# Patient Record
Sex: MALE | Race: WHITE | NOT HISPANIC OR LATINO | Employment: OTHER | URBAN - METROPOLITAN AREA
[De-identification: names, ages, dates, MRNs, and addresses within clinical notes are randomized per-mention and may not be internally consistent; named-entity substitution may affect disease eponyms.]

---

## 2017-02-13 RX ORDER — CLOPIDOGREL BISULFATE 75 MG/1
75 TABLET ORAL DAILY
Qty: 30 TABLET | Refills: 11 | Status: SHIPPED | OUTPATIENT
Start: 2017-02-13 | End: 2017-11-14 | Stop reason: SDUPTHER

## 2017-02-13 NOTE — TELEPHONE ENCOUNTER
Spoke to the pt, pt stated that he will schedule his f/u appt, but also need refill on his Plavix.

## 2017-11-16 RX ORDER — CLOPIDOGREL BISULFATE 75 MG/1
TABLET ORAL
Qty: 30 TABLET | Refills: 10 | Status: SHIPPED | OUTPATIENT
Start: 2017-11-16 | End: 2018-12-18

## 2018-12-05 ENCOUNTER — TELEPHONE (OUTPATIENT)
Dept: VASCULAR SURGERY | Facility: CLINIC | Age: 63
End: 2018-12-05

## 2018-12-05 DIAGNOSIS — I73.9 PVD (PERIPHERAL VASCULAR DISEASE): ICD-10-CM

## 2018-12-05 DIAGNOSIS — I65.23 BILATERAL CAROTID ARTERY STENOSIS: Primary | ICD-10-CM

## 2018-12-05 NOTE — TELEPHONE ENCOUNTER
Contacted patient to schedule appts. Notified patient he is overdue for his FU with Dr. Vuong. Appts scheduled, pt verified. Appt letter placed in mail. ----- Message from Erika Peres sent at 12/5/2018 10:34 AM CST -----  Contact: pt   Patient Requesting Sooner Appointment.     Reason for sooner appt.: pt is calling to speak with the nurse pt is a new pt pt is being referred by [facility] for PVD   When is the first available appointment? N/A  Communication Preference: pt   Additional Information: can you please call pt at  [phone]    DENIS

## 2018-12-18 ENCOUNTER — HOSPITAL ENCOUNTER (OUTPATIENT)
Dept: VASCULAR SURGERY | Facility: CLINIC | Age: 63
Discharge: HOME | End: 2018-12-18
Attending: SURGERY
Payer: MEDICARE

## 2018-12-18 ENCOUNTER — OFFICE VISIT (OUTPATIENT)
Dept: VASCULAR SURGERY | Facility: CLINIC | Age: 63
End: 2018-12-18
Payer: MEDICARE

## 2018-12-18 VITALS
HEIGHT: 69 IN | DIASTOLIC BLOOD PRESSURE: 75 MMHG | HEART RATE: 68 BPM | TEMPERATURE: 98 F | BODY MASS INDEX: 27.11 KG/M2 | SYSTOLIC BLOOD PRESSURE: 173 MMHG | WEIGHT: 183 LBS

## 2018-12-18 DIAGNOSIS — M79.606 ISCHEMIC REST PAIN OF LOWER EXTREMITY: ICD-10-CM

## 2018-12-18 DIAGNOSIS — I73.9 PERIPHERAL ARTERIAL DISEASE: Primary | ICD-10-CM

## 2018-12-18 DIAGNOSIS — F17.210 CIGARETTE NICOTINE DEPENDENCE WITHOUT COMPLICATION: ICD-10-CM

## 2018-12-18 DIAGNOSIS — I99.8 ISCHEMIC REST PAIN OF LOWER EXTREMITY: ICD-10-CM

## 2018-12-18 DIAGNOSIS — F17.200 SMOKER: Chronic | ICD-10-CM

## 2018-12-18 DIAGNOSIS — I65.23 BILATERAL CAROTID ARTERY STENOSIS: ICD-10-CM

## 2018-12-18 DIAGNOSIS — I70.90 ATHEROSCLEROSIS: Primary | ICD-10-CM

## 2018-12-18 DIAGNOSIS — I65.22 STENOSIS OF LEFT CAROTID ARTERY: ICD-10-CM

## 2018-12-18 DIAGNOSIS — I70.229 ATHEROSCLEROTIC PERIPHERAL VASCULAR DISEASE WITH REST PAIN: ICD-10-CM

## 2018-12-18 DIAGNOSIS — I73.9 PVD (PERIPHERAL VASCULAR DISEASE): ICD-10-CM

## 2018-12-18 PROCEDURE — 99999 PR PBB SHADOW E&M-EST. PATIENT-LVL III: CPT | Mod: PBBFAC,,, | Performed by: SURGERY

## 2018-12-18 PROCEDURE — 3008F BODY MASS INDEX DOCD: CPT | Mod: CPTII,S$GLB,, | Performed by: SURGERY

## 2018-12-18 PROCEDURE — 93923 UPR/LXTR ART STDY 3+ LVLS: CPT | Mod: S$GLB,,, | Performed by: SURGERY

## 2018-12-18 PROCEDURE — 99406 BEHAV CHNG SMOKING 3-10 MIN: CPT | Mod: S$GLB,,, | Performed by: SURGERY

## 2018-12-18 PROCEDURE — 3077F SYST BP >= 140 MM HG: CPT | Mod: CPTII,S$GLB,, | Performed by: SURGERY

## 2018-12-18 PROCEDURE — 3078F DIAST BP <80 MM HG: CPT | Mod: CPTII,S$GLB,, | Performed by: SURGERY

## 2018-12-18 PROCEDURE — 93880 EXTRACRANIAL BILAT STUDY: CPT | Mod: S$GLB,,, | Performed by: SURGERY

## 2018-12-18 PROCEDURE — 99215 OFFICE O/P EST HI 40 MIN: CPT | Mod: 25,S$GLB,, | Performed by: SURGERY

## 2018-12-18 RX ORDER — CARVEDILOL 25 MG/1
TABLET ORAL
COMMUNITY
End: 2019-03-11 | Stop reason: SDUPTHER

## 2018-12-18 RX ORDER — AMLODIPINE BESYLATE 10 MG/1
TABLET ORAL
COMMUNITY
End: 2019-03-11 | Stop reason: SDUPTHER

## 2018-12-18 RX ORDER — LORAZEPAM 0.5 MG/1
TABLET ORAL
COMMUNITY
End: 2020-10-29

## 2018-12-18 RX ORDER — CLONIDINE HYDROCHLORIDE 0.2 MG/1
TABLET ORAL
Status: ON HOLD | COMMUNITY
End: 2019-03-04 | Stop reason: HOSPADM

## 2018-12-18 RX ORDER — MUPIROCIN 20 MG/G
OINTMENT TOPICAL
Status: CANCELLED | OUTPATIENT
Start: 2018-12-18

## 2018-12-18 RX ORDER — LISINOPRIL 20 MG/1
TABLET ORAL
Status: ON HOLD | COMMUNITY
End: 2019-03-04 | Stop reason: HOSPADM

## 2018-12-18 RX ORDER — ATORVASTATIN CALCIUM 40 MG/1
TABLET, FILM COATED ORAL
Status: ON HOLD | COMMUNITY
End: 2019-03-04 | Stop reason: HOSPADM

## 2018-12-18 RX ORDER — SODIUM CHLORIDE 0.9 % (FLUSH) 0.9 %
5 SYRINGE (ML) INJECTION
Status: CANCELLED | OUTPATIENT
Start: 2018-12-18

## 2018-12-18 RX ORDER — NITROGLYCERIN 0.4 MG/1
TABLET SUBLINGUAL
Status: ON HOLD | COMMUNITY
End: 2020-12-15 | Stop reason: SDUPTHER

## 2018-12-18 RX ORDER — CLOPIDOGREL BISULFATE 75 MG/1
TABLET ORAL
COMMUNITY
End: 2019-03-11 | Stop reason: SDUPTHER

## 2018-12-18 RX ORDER — METOPROLOL TARTRATE 25 MG/1
TABLET, FILM COATED ORAL
Status: ON HOLD | COMMUNITY
End: 2019-03-04 | Stop reason: HOSPADM

## 2018-12-18 RX ORDER — LIDOCAINE HYDROCHLORIDE 10 MG/ML
1 INJECTION, SOLUTION EPIDURAL; INFILTRATION; INTRACAUDAL; PERINEURAL ONCE
Status: CANCELLED | OUTPATIENT
Start: 2018-12-18 | End: 2018-12-18

## 2018-12-18 RX ORDER — ALLOPURINOL 100 MG/1
100 TABLET ORAL DAILY
Status: ON HOLD | COMMUNITY
Start: 2018-12-05 | End: 2020-12-15 | Stop reason: HOSPADM

## 2018-12-18 RX ORDER — HYDROCHLOROTHIAZIDE 25 MG/1
TABLET ORAL
Status: ON HOLD | COMMUNITY
End: 2019-03-04 | Stop reason: HOSPADM

## 2018-12-18 NOTE — PROGRESS NOTES
See my prior note; review of systems, family history and social history are   unchanged.    REFERRING PHYSICIAN:  Dr. RICK Argueta    HISTORY OF PRESENT ILLNESS:  A 63-year-old male status post:  1.  Bilateral kissing iliac stent placement, iCAST on the right, 03/31/2016.  2.  Urgent right carotid endarterectomy, 01/05/2015, done for highly symptomatic   greater 99% stenosis with multiple episodes of amaurosis and right cortical   TIA.    I have not seen him in over 2-1/2 years.  He presents with worsening leg pain   with ambulation as well as possible ischemic rest pain to his feet.    He denies stroke, TIA or amaurosis.    PAST MEDICAL HISTORY:  1.  Known coronary artery disease, status post CABG approximately five years   ago.  He does admit to some chest pain with exertion.  2.  Known advanced stage III-IV chronic kidney disease, most recent creatinine   2.1 with GFR of 32.  This is better than his recent baseline.    SOCIAL HISTORY:  Continues smoking one pack per day.    MEDICATIONS:  Include Plavix, statin and Pletal.    PHYSICAL EXAMINATION:  VITAL SIGNS:  See nursing note.  ABDOMEN:  Soft, tender.  EXTREMITIES:  Femoral pulses are 1+ on the right, trace palpable on the left.    Pedal pulses are not palpable.    IMAGING:  ABIs are 0.47 on the right and 0.42 on the left, compared with 0.70 on   the right and 0.79 on the left 2-1/2 years ago.    Carotid duplex reveals no recurrent right carotid stenosis.    However, on the left, there appears to be a high-grade proximal common carotid   artery stenosis with peak velocity of 497, mid common carotid velocity of 370.    The ICA does not appear particularly stenotic.    Recent creatinine 2.1 and GFR of 32.    His prior arteriogram from 2016 was personally rereviewed.  At that time, he   underwent a CO2 mostly arteriogram with bilateral kissing iliac stent placement.    Outside duplex suggests a left SFA stenosis.    ASSESSMENT:  1.  Atherosclerotic peripheral  arterial occlusive disease with probable ischemic   rest pain.  His ABIs have fallen significantly and his femoral pulses are   diminished.  I would suspect recurrent iliac occlusive disease plus/minus   progression of his infrainguinal disease.  Given he appears to have some   ischemic rest pain, more aggressive stance towards intervention would be   reasonable.  2.  Significant what appears to be fairly diffuse left common carotid artery   stenosis, although clinically asymptomatic with no TIA or stroke.  3.  Exertional chest pain.  4.  Continued smoking.    I had a long discussion with him about the importance of smoking cessation and   that continued smoking will clearly take years off his life with a higher risk   of stroke, MI or need for leg amputation.  He sounds like he is willing to   consider attempts at reducing his smoking.    PLAN:  1.  Stop smoking as detailed above.  Greater than three minutes were spent with   this.  2.  Right common femoral artery approach aortoiliac CO2 arteriogram and possible   intervention, 01/02/2019.  3.  Suggest consideration of Cardiology evaluation of his chest pain.  However,   given his significant renal insufficiency, this is quite problematic, as he is   unlikely to tolerate a large dye load.    The patient understands the risks and rationale of procedure and wishes to   proceed.          /katherine [id number] fred(s)        CRISTI/ALEX  dd: 12/18/2018 17:11:05 (CST)  td: 12/19/2018 03:47:55 (CST)  Doc ID   #[id number]  Job ID #[id number]    CC:

## 2018-12-18 NOTE — H&P (VIEW-ONLY)
See my prior note; review of systems, family history and social history are   unchanged.    REFERRING PHYSICIAN:  Dr. RICK Argueta    HISTORY OF PRESENT ILLNESS:  A 63-year-old male status post:  1.  Bilateral kissing iliac stent placement, iCAST on the right, 03/31/2016.  2.  Urgent right carotid endarterectomy, 01/05/2015, done for highly symptomatic   greater 99% stenosis with multiple episodes of amaurosis and right cortical   TIA.    I have not seen him in over 2-1/2 years.  He presents with worsening leg pain   with ambulation as well as possible ischemic rest pain to his feet.    He denies stroke, TIA or amaurosis.    PAST MEDICAL HISTORY:  1.  Known coronary artery disease, status post CABG approximately five years   ago.  He does admit to some chest pain with exertion.  2.  Known advanced stage III-IV chronic kidney disease, most recent creatinine   2.1 with GFR of 32.  This is better than his recent baseline.    SOCIAL HISTORY:  Continues smoking one pack per day.    MEDICATIONS:  Include Plavix, statin and Pletal.    PHYSICAL EXAMINATION:  VITAL SIGNS:  See nursing note.  ABDOMEN:  Soft, tender.  EXTREMITIES:  Femoral pulses are 1+ on the right, trace palpable on the left.    Pedal pulses are not palpable.    IMAGING:  ABIs are 0.47 on the right and 0.42 on the left, compared with 0.70 on   the right and 0.79 on the left 2-1/2 years ago.    Carotid duplex reveals no recurrent right carotid stenosis.    However, on the left, there appears to be a high-grade proximal common carotid   artery stenosis with peak velocity of 497, mid common carotid velocity of 370.    The ICA does not appear particularly stenotic.    Recent creatinine 2.1 and GFR of 32.    His prior arteriogram from 2016 was personally rereviewed.  At that time, he   underwent a CO2 mostly arteriogram with bilateral kissing iliac stent placement.    Outside duplex suggests a left SFA stenosis.    ASSESSMENT:  1.  Atherosclerotic peripheral  arterial occlusive disease with probable ischemic   rest pain.  His ABIs have fallen significantly and his femoral pulses are   diminished.  I would suspect recurrent iliac occlusive disease plus/minus   progression of his infrainguinal disease.  Given he appears to have some   ischemic rest pain, more aggressive stance towards intervention would be   reasonable.  2.  Significant what appears to be fairly diffuse left common carotid artery   stenosis, although clinically asymptomatic with no TIA or stroke.  3.  Exertional chest pain.  4.  Continued smoking.    I had a long discussion with him about the importance of smoking cessation and   that continued smoking will clearly take years off his life with a higher risk   of stroke, MI or need for leg amputation.  He sounds like he is willing to   consider attempts at reducing his smoking.    PLAN:  1.  Stop smoking as detailed above.  Greater than three minutes were spent with   this.  2.  Right common femoral artery approach aortoiliac CO2 arteriogram and possible   intervention, 01/02/2019.  3.  Suggest consideration of Cardiology evaluation of his chest pain.  However,   given his significant renal insufficiency, this is quite problematic, as he is   unlikely to tolerate a large dye load.    The patient understands the risks and rationale of procedure and wishes to   proceed.          /katherine [id number] fred(s)        CRISTI/ALEX  dd: 12/18/2018 17:11:05 (CST)  td: 12/19/2018 03:47:55 (CST)  Doc ID   #[id number]  Job ID #[id number]    CC:

## 2018-12-31 ENCOUNTER — TELEPHONE (OUTPATIENT)
Dept: VASCULAR SURGERY | Facility: CLINIC | Age: 63
End: 2018-12-31

## 2018-12-31 RX ORDER — ASPIRIN 81 MG/1
81 TABLET ORAL DAILY
COMMUNITY
End: 2020-10-29 | Stop reason: SDUPTHER

## 2018-12-31 NOTE — TELEPHONE ENCOUNTER
Contacted patient with time of arrival as well as instructions for Prednisone prep. Pt verbalized understanding for times to take Prednisone prep. States he would like prescription to be called in to [facility] pharmacy in [place] on [address].

## 2018-12-31 NOTE — PRE-PROCEDURE INSTRUCTIONS
PreOp Instructions given:     - Verbal medication information (what to hold and what to take)   - NPO guidelines   - Arrival place directions given;   - Bathing with antibacterial soap   - Don't wear any jewelry or bring any valuables AM of surgery   - No makeup or moisturizer to face   - No perfume/cologne, powder, lotions or aftershave     Pt. verbalized understanding.     Denies any  history of side effects or issues with anesthesia or sedation.

## 2019-01-01 ENCOUNTER — ANESTHESIA EVENT (OUTPATIENT)
Dept: SURGERY | Facility: HOSPITAL | Age: 64
End: 2019-01-01
Payer: MEDICARE

## 2019-01-01 NOTE — ANESTHESIA PREPROCEDURE EVALUATION
[facility]  Anesthesia Pre-Operative Evaluation         Patient Name: Emanuel Daniel  YOB: 1955  MRN: [id number]    SUBJECTIVE:     Pre-operative evaluation for Procedure(s) (LRB):  ARTERIOGRAM-LEG AND ULTRASOUND (Right)     01/01/2019    Emanuel Daniel is a 63 y.o. male w/ a significant PMHx of CHFrEF, CAD s/p CABG, HTN, HLD, alcohol use, tobacco use, and PVD s/p BL iliac stent placement 03/2016 and urgent CEA 2015 now with rest pain who presents for the above procedure.    Pt has a documented hx of anaphylaxis rxn to iodine.     Prev airway: not documented in 2015 record; other anesthestics MAC        Patient Active Problem List   Diagnosis    Angina of effort    CAD (coronary artery disease)    Smoker    Alcohol abuse    PVD (peripheral vascular disease)    HTN (hypertension)    HLD (hyperlipidemia)    Coronary atherosclerosis of unspecified type of vessel, native or graft    S/P CABG x 1    Herniated thoracic disc without myelopathy    Left leg weakness    Thoracic disc disease    Bilateral leg weakness    NSTEMI (non-ST elevated myocardial infarction)    NSTEMI (non-ST elevated myocardial infarction)    Renal artery stenosis    Hypoplasia of one kidney    Carotid stenosis    CKD (chronic kidney disease) stage 4, GFR 15-29 ml/min    S/P carotid endarterectomy    Atherosclerotic peripheral vascular disease with rest pain    Cigarette nicotine dependence without complication       Review of patient's allergies indicates:   Allergen Reactions    Iodine and iodide containing products      Anaphylactic rxn        Current Inpatient Medications:      No current facility-administered medications on file prior to encounter.      Current Outpatient Medications on File Prior to Encounter   Medication Sig Dispense Refill    allopurinol (ZYLOPRIM) 100 MG tablet Take 100 mg by mouth once daily.       amLODIPine (NORVASC) 10 MG tablet amlodipine 10 mg tablet q HS       aspirin (ECOTRIN) 81 MG EC tablet Take 81 mg by mouth once daily.      atorvastatin (LIPITOR) 40 MG tablet atorvastatin 40 mg tablet      carvedilol (COREG) 25 MG tablet carvedilol 25 mg tablet TWO TIMES A DAY      cloNIDine (CATAPRES) 0.2 MG tablet clonidine HCl 0.2 mg tablet TWO TIMES A DAY      clopidogrel (PLAVIX) 75 mg tablet clopidogrel 75 mg tablet      hydroCHLOROthiazide (HYDRODIURIL) 25 MG tablet hydrochlorothiazide 25 mg tablet      lisinopril (PRINIVIL,ZESTRIL) 20 MG tablet lisinopril 20 mg tablet      LORazepam (ATIVAN) 0.5 MG tablet Ativan 0.5 mg tablet   Take 1 tablet by oral route at bedtime.      metoprolol tartrate (LOPRESSOR) 25 MG tablet metoprolol tartrate 25 mg tablet two times a day      nitroGLYCERIN (NITROSTAT) 0.4 MG SL tablet nitroglycerin 0.4 mg sublingual tablet         Past Surgical History:   Procedure Laterality Date    ANGIOGRAM-EXTREMITY-LOWER Bilateral 3/31/2016    Performed by GINA Vuong III, MD at [facility] OR [address] [place] [address]    ANGIOGRAM-RENAL Bilateral 7/30/2014    Performed by Inocencio Gotti MD at [facility] CATH LAB    ANGIOPLASTY      ANGIOPLASTY WITH STENT PLACEMENT Bilateral 3/31/2016    Performed by GINA Vuong III, MD at [facility] OR [address] [place] [address]    AORTOGRAM Bilateral 3/31/2016    Performed by GINA Vuong III, MD at [facility] OR [address] [place] [address]    CARDIAC CATHETERIZATION      CARDIAC CATHETERIZATION      CATHETERIZATION, HEART, LEFT N/A 8/30/2013    Performed by Inocencio Gotti MD at [facility] CATH LAB    CORONARY ANGIOPLASTY      CORONARY ARTERY BYPASS GRAFT      CORONARY ARTERY BYPASS GRAFT (CABG) N/A 8/26/2013    Performed by Pj Duff MD at [facility] OR [address] [place] [address]    ENDARTERECTOMY-CAROTID Right 1/5/2015    Performed by GINA Vuong III, MD at [facility] OR [address] [place] [address]    HEMORRHOID SURGERY      INSERTION, STENT, CORONARY ARTERY N/A 8/7/2013    Performed by Inocencio Gotti MD at [facility] CATH LAB    TONSILLECTOMY         Social History     Socioeconomic History    Marital  status: Single     Spouse name: Not on file    Number of children: Not on file    Years of education: Not on file    Highest education level: Not on file   Social Needs    Financial resource strain: Not on file    Food insecurity - worry: Not on file    Food insecurity - inability: Not on file    Transportation needs - medical: Not on file    Transportation needs - non-medical: Not on file   Occupational History    Not on file   Tobacco Use    Smoking status: Current Every Day Smoker     Packs/day: 1.00    Smokeless tobacco: Former User   Substance and Sexual Activity    Alcohol use: Yes     Alcohol/week: 100.8 oz     Types: 168 Cans of beer per week     Comment: 3-4 cans of beer a day    Drug use: No    Sexual activity: Not on file   Other Topics Concern    Not on file   Social History Narrative    Not on file       OBJECTIVE:     Vital Signs Range (Last 24H):         CBC:   Lab Results   Component Value Date    WBC 6.06 12/04/2018    HGB 14.5 12/04/2018    HCT 44.0 12/04/2018    MCV 87 12/04/2018     12/04/2018       CMP:   CMP  Sodium   Date Value Ref Range Status   12/04/2018 140 136 - 145 mmol/L Final     Potassium   Date Value Ref Range Status   12/04/2018 4.8 3.5 - 5.1 mmol/L Final     Chloride   Date Value Ref Range Status   12/04/2018 106 95 - 110 mmol/L Final     CO2   Date Value Ref Range Status   12/04/2018 25 23 - 29 mmol/L Final     Glucose   Date Value Ref Range Status   12/04/2018 104 70 - 110 mg/dL Final     BUN, Bld   Date Value Ref Range Status   12/04/2018 22 (H) 2 - 20 mg/dL Final     Creatinine   Date Value Ref Range Status   12/04/2018 2.14 (H) 0.50 - 1.40 mg/dL Final     Calcium   Date Value Ref Range Status   12/04/2018 9.8 8.7 - 10.5 mg/dL Final     Total Protein   Date Value Ref Range Status   12/04/2018 7.6 6.0 - 8.4 g/dL Final     Albumin   Date Value Ref Range Status   12/04/2018 4.2 3.5 - 5.2 g/dL Final     Total Bilirubin   Date Value Ref Range Status    12/04/2018 0.5 0.1 - 1.0 mg/dL Final     Comment:     For infants and newborns, interpretation of results should be based  on gestational age, weight and in agreement with clinical  observations.  Premature Infant recommended reference ranges:  Up to 24 hours.............<8.0 mg/dL  Up to 48 hours............<12.0 mg/dL  3-5 days..................<15.0 mg/dL  6-29 days.................<15.0 mg/dL       Alkaline Phosphatase   Date Value Ref Range Status   12/04/2018 105 38 - 126 U/L Final     AST   Date Value Ref Range Status   12/04/2018 13 (L) 15 - 46 U/L Final     ALT   Date Value Ref Range Status   12/04/2018 12 10 - 44 U/L Final     Anion Gap   Date Value Ref Range Status   12/04/2018 9 8 - 16 mmol/L Final     eGFR if    Date Value Ref Range Status   12/04/2018 37.0 (A) >60 mL/min/1.73 m^2 Final     eGFR if non    Date Value Ref Range Status   12/04/2018 32.0 (A) >60 mL/min/1.73 m^2 Final     Comment:     Calculation used to obtain the estimated glomerular filtration  rate (eGFR) is the CKD-EPI equation.          INR:  No results for input(s): PT, INR, PROTIME, APTT in the last 72 hours.    Diagnostic Studies: No relevant studies.    EKG: No recent studies available.    2D ECHO:  Results for orders placed or performed during the hospital encounter of 10/04/14   2D echo with color flow doppler   Result Value Ref Range    QEF 65 55 - 65    Mitral Valve Regurgitation MILD     Diastolic Dysfunction Yes (A)     Aortic Valve Regurgitation MILD          ASSESSMENT/PLAN:         Anesthesia Evaluation    I have reviewed the Patient Summary Reports.     I have reviewed the Nursing Notes.   I have reviewed the Medications.     Review of Systems  Anesthesia Hx:  No problems with previous Anesthesia  History of prior surgery of interest to airway management or planning: (CABG 2013, hemorroidectomy) heart surgery. Previous anesthesia: MAC  Heart Cath 10/2014 with MAC.  Edwardies Family Hx of  Anesthesia complications.   Denies Personal Hx of Anesthesia complications.   Social:  Smoker  Patient's occupation is [employer]. Tobacco Use:  of cigarette, 1 pack per day Alcohol Use: Pt consumes 5-6 beers daily,    Hematology/Oncology:         -- Denies Anemia: -- Coag Disorders: Bleeding Disorder: currently taking: clopidogrel and aspirin  Denies Current/Recent Cancer   EENT/Dental:   Eyes: Visual Impairment amaurosis fugax in the right eye Denies ear symptoms  Denies Nasal Symptoms.  Denies Throat Symptoms  Denies Active Dental Problems  Denies Jaw Problems   Cardiovascular:  Functional Capacity Very limited by back pain and weakness, ADLs, works as an  but mostly from a desk-occasional  SOB and CP with exertion.  Coronary Artery Disease: S/P Percutaneous Coronary Intervention (PCI) (2013 and 10/2014) , currently on aspirin, currently on clopidogrel (Plavix). S/P Aorto-Coronary Bypass Graft Surgery (CABG): CABG was 8/2013 Hx of Myocardial Infarction, NSTEMI, NSTEMI date of 2013  Peripheral Arterial Disease, Claudication (s/p R EIA stent for claudication ) Renal Artery Stenosis  Carotoid Artery Disease, bilateral , Left stenosis is 40-59% , Right stenosis is  80-99% Hypertension    Pulmonary:  Pulmonary Normal    Renal/:   1 functioning kidney per last anesthesia record. Kidney Function/Disease (Renal artery stenosis)    Hepatic/GI:  Denies Hepatic/GI Symptoms  Denies Hernia Denies Liver Disease    Musculoskeletal:  Musculoskeletal General/Symptoms: joint stiffness, low back pain, neck pain.  Denies Muscle Disorders  Spine Disorders: Herniated Disc, Thoracic Herniated Disc Thoracic Spine Disorders, Thoracic Disc Disease    Neurological:  Neurology Normal  Neuro Symptoms of weakness, pain Back and legsPain , onset is chronic , location of neck and back  , quality of aching/dull, sharp  Denies Seizure Disorder  CVA - Cerebrovasular Accident , has had 1 stroke  TIA - Transient Ischemic Attack   "  Endocrine:  Denies Diabetes  Denies Thyroid Disease  Metabolic Disorders, Hyperlipoproteinemia, mixed hyperlipidemiaDenies Obesity / BMI > 30  Dermatological:  Denies Skin Symptoms/Problems   Psych:   Denies Anxiety Disorder.   Denies Depression.          Physical Exam  General:  Large Beard, Obesity    Airway/Jaw/Neck:  Airway Findings: Mouth Opening: Normal Tongue: Normal  General Airway Assessment: Adult, Possible difficult intubation, Possible difficult mask airway  Large, "bushy" beard  Mallampati: III  Improves to II with phonation.  TM Distance: Normal, at least 6 cm  Jaw/Neck Findings:  Neck ROM: Normal ROM       Chest/Lungs:  Chest/Lungs Findings: Rhonchi, Expiratory Wheezes, Mild     Heart/Vascular:  Heart Findings: Rhythm: Regular Rhythm        Mental Status:  Mental Status Findings:  Alert and Oriented         Anesthesia Plan  Type of Anesthesia, risks & benefits discussed:  Anesthesia Type:  MAC, general  Patient's Preference:   Intra-op Monitoring Plan: standard ASA monitors  Intra-op Monitoring Plan Comments:   Post Op Pain Control Plan: per primary service following discharge from PACU, IV/PO Opioids PRN and multimodal analgesia  Post Op Pain Control Plan Comments:   Induction:   IV  Beta Blocker:  Patient is on a Beta-Blocker and has received one dose within the past 24 hours (No further documentation required).       Informed Consent: Patient understands risks and agrees with Anesthesia plan.  Questions answered. Anesthesia consent signed with patient.  ASA Score: 3     Day of Surgery Review of History & Physical:    H&P update referred to the surgeon.         Ready For Surgery From Anesthesia Perspective.       "

## 2019-01-02 ENCOUNTER — ANESTHESIA (OUTPATIENT)
Dept: SURGERY | Facility: HOSPITAL | Age: 64
End: 2019-01-02
Payer: MEDICARE

## 2019-01-02 ENCOUNTER — HOSPITAL ENCOUNTER (OUTPATIENT)
Facility: HOSPITAL | Age: 64
Discharge: HOME | End: 2019-01-02
Attending: SURGERY | Admitting: SURGERY
Payer: MEDICARE

## 2019-01-02 VITALS
WEIGHT: 185 LBS | SYSTOLIC BLOOD PRESSURE: 149 MMHG | RESPIRATION RATE: 16 BRPM | HEIGHT: 69 IN | TEMPERATURE: 98 F | OXYGEN SATURATION: 97 % | HEART RATE: 68 BPM | BODY MASS INDEX: 27.4 KG/M2 | DIASTOLIC BLOOD PRESSURE: 68 MMHG

## 2019-01-02 DIAGNOSIS — I73.9 PERIPHERAL ARTERIAL DISEASE: ICD-10-CM

## 2019-01-02 PROCEDURE — 71000015 HC POSTOP RECOV 1ST HR: Performed by: SURGERY

## 2019-01-02 PROCEDURE — D9220A PRA ANESTHESIA: Mod: ,,, | Performed by: ANESTHESIOLOGY

## 2019-01-02 PROCEDURE — 37220 PR REVASCULARIZE ILIAC ARTERY,ANGIOPLASTY, INITIAL VESSEL: CPT | Mod: 50,,, | Performed by: SURGERY

## 2019-01-02 PROCEDURE — C1725 CATH, TRANSLUMIN NON-LASER: HCPCS | Performed by: SURGERY

## 2019-01-02 PROCEDURE — C1760 CLOSURE DEV, VASC: HCPCS | Performed by: SURGERY

## 2019-01-02 PROCEDURE — 63600175 PHARM REV CODE 636 W HCPCS: Performed by: NURSE ANESTHETIST, CERTIFIED REGISTERED

## 2019-01-02 PROCEDURE — 63600175 PHARM REV CODE 636 W HCPCS: Performed by: SURGERY

## 2019-01-02 PROCEDURE — 25000003 PHARM REV CODE 250: Performed by: ANESTHESIOLOGY

## 2019-01-02 PROCEDURE — 63600175 PHARM REV CODE 636 W HCPCS: Performed by: ANESTHESIOLOGY

## 2019-01-02 PROCEDURE — 37000008 HC ANESTHESIA 1ST 15 MINUTES: Performed by: SURGERY

## 2019-01-02 PROCEDURE — 37000009 HC ANESTHESIA EA ADD 15 MINS: Performed by: SURGERY

## 2019-01-02 PROCEDURE — 75716 ARTERY X-RAYS ARMS/LEGS: CPT | Mod: 26,59,, | Performed by: SURGERY

## 2019-01-02 PROCEDURE — 37222 PR REVASCULARIZE ILIAC ARTERY,ANGIOPLASTY, EA ADD VESSEL: ICD-10-PCS | Mod: RT,,, | Performed by: SURGERY

## 2019-01-02 PROCEDURE — D9220A PRA ANESTHESIA: ICD-10-PCS | Mod: ,,, | Performed by: ANESTHESIOLOGY

## 2019-01-02 PROCEDURE — 37222 PR REVASCULARIZE ILIAC ARTERY,ANGIOPLASTY, EA ADD VESSEL: CPT | Mod: RT,,, | Performed by: SURGERY

## 2019-01-02 PROCEDURE — C1769 GUIDE WIRE: HCPCS | Performed by: SURGERY

## 2019-01-02 PROCEDURE — 25500020 PHARM REV CODE 255: Performed by: SURGERY

## 2019-01-02 PROCEDURE — 27201423 OPTIME MED/SURG SUP & DEVICES STERILE SUPPLY: Performed by: SURGERY

## 2019-01-02 PROCEDURE — 36000705 HC OR TIME LEV I EA ADD 15 MIN: Performed by: SURGERY

## 2019-01-02 PROCEDURE — 75716 PR  ANGIO EXTERMITY BILAT: ICD-10-PCS | Mod: 26,59,, | Performed by: SURGERY

## 2019-01-02 PROCEDURE — C1894 INTRO/SHEATH, NON-LASER: HCPCS | Performed by: SURGERY

## 2019-01-02 PROCEDURE — 71000016 HC POSTOP RECOV ADDL HR: Performed by: SURGERY

## 2019-01-02 PROCEDURE — 37220 PR REVASCULARIZE ILIAC ARTERY,ANGIOPLASTY, INITIAL VESSEL: ICD-10-PCS | Mod: 50,,, | Performed by: SURGERY

## 2019-01-02 PROCEDURE — 36000704 HC OR TIME LEV I 1ST 15 MIN: Performed by: SURGERY

## 2019-01-02 PROCEDURE — 25000003 PHARM REV CODE 250: Performed by: SURGERY

## 2019-01-02 RX ORDER — HEPARIN SODIUM 5000 [USP'U]/ML
INJECTION, SOLUTION INTRAVENOUS; SUBCUTANEOUS
Status: DISCONTINUED | OUTPATIENT
Start: 2019-01-02 | End: 2019-01-02

## 2019-01-02 RX ORDER — FENTANYL CITRATE 50 UG/ML
INJECTION, SOLUTION INTRAMUSCULAR; INTRAVENOUS
Status: DISCONTINUED | OUTPATIENT
Start: 2019-01-02 | End: 2019-01-02

## 2019-01-02 RX ORDER — MUPIROCIN 20 MG/G
OINTMENT TOPICAL
Status: DISCONTINUED | OUTPATIENT
Start: 2019-01-02 | End: 2019-01-02 | Stop reason: HOSPADM

## 2019-01-02 RX ORDER — PROPOFOL 10 MG/ML
VIAL (ML) INTRAVENOUS
Status: DISCONTINUED | OUTPATIENT
Start: 2019-01-02 | End: 2019-01-02

## 2019-01-02 RX ORDER — HEPARIN SODIUM 1000 [USP'U]/ML
INJECTION, SOLUTION INTRAVENOUS; SUBCUTANEOUS
Status: DISCONTINUED | OUTPATIENT
Start: 2019-01-02 | End: 2019-01-02 | Stop reason: HOSPADM

## 2019-01-02 RX ORDER — ONDANSETRON 2 MG/ML
4 INJECTION INTRAMUSCULAR; INTRAVENOUS DAILY PRN
Status: DISCONTINUED | OUTPATIENT
Start: 2019-01-02 | End: 2019-01-02 | Stop reason: HOSPADM

## 2019-01-02 RX ORDER — PROTAMINE SULFATE 10 MG/ML
INJECTION, SOLUTION INTRAVENOUS
Status: DISCONTINUED | OUTPATIENT
Start: 2019-01-02 | End: 2019-01-02

## 2019-01-02 RX ORDER — MIDAZOLAM HYDROCHLORIDE 1 MG/ML
INJECTION, SOLUTION INTRAMUSCULAR; INTRAVENOUS
Status: DISCONTINUED | OUTPATIENT
Start: 2019-01-02 | End: 2019-01-02

## 2019-01-02 RX ORDER — LIDOCAINE HYDROCHLORIDE 10 MG/ML
1 INJECTION, SOLUTION EPIDURAL; INFILTRATION; INTRACAUDAL; PERINEURAL ONCE
Status: DISCONTINUED | OUTPATIENT
Start: 2019-01-02 | End: 2019-01-02 | Stop reason: HOSPADM

## 2019-01-02 RX ORDER — CEFAZOLIN SODIUM 1 G/3ML
2 INJECTION, POWDER, FOR SOLUTION INTRAMUSCULAR; INTRAVENOUS
Status: DISCONTINUED | OUTPATIENT
Start: 2019-01-02 | End: 2019-01-02 | Stop reason: HOSPADM

## 2019-01-02 RX ORDER — SODIUM CHLORIDE 0.9 % (FLUSH) 0.9 %
5 SYRINGE (ML) INJECTION
Status: DISCONTINUED | OUTPATIENT
Start: 2019-01-02 | End: 2019-01-02 | Stop reason: HOSPADM

## 2019-01-02 RX ORDER — SODIUM CHLORIDE 0.9 % (FLUSH) 0.9 %
3 SYRINGE (ML) INJECTION
Status: DISCONTINUED | OUTPATIENT
Start: 2019-01-02 | End: 2019-01-02 | Stop reason: HOSPADM

## 2019-01-02 RX ORDER — IODIXANOL 320 MG/ML
INJECTION, SOLUTION INTRAVASCULAR
Status: DISCONTINUED | OUTPATIENT
Start: 2019-01-02 | End: 2019-01-02 | Stop reason: HOSPADM

## 2019-01-02 RX ORDER — SODIUM CHLORIDE 9 MG/ML
INJECTION, SOLUTION INTRAVENOUS CONTINUOUS PRN
Status: DISCONTINUED | OUTPATIENT
Start: 2019-01-02 | End: 2019-01-02

## 2019-01-02 RX ORDER — LIDOCAINE HYDROCHLORIDE 10 MG/ML
INJECTION, SOLUTION EPIDURAL; INFILTRATION; INTRACAUDAL; PERINEURAL
Status: DISCONTINUED | OUTPATIENT
Start: 2019-01-02 | End: 2019-01-02 | Stop reason: HOSPADM

## 2019-01-02 RX ORDER — SODIUM CHLORIDE 9 MG/ML
INJECTION, SOLUTION INTRAVENOUS CONTINUOUS
Status: DISCONTINUED | OUTPATIENT
Start: 2019-01-02 | End: 2019-01-02 | Stop reason: HOSPADM

## 2019-01-02 RX ADMIN — MIDAZOLAM HYDROCHLORIDE 1 MG: 1 INJECTION, SOLUTION INTRAMUSCULAR; INTRAVENOUS at 11:01

## 2019-01-02 RX ADMIN — FENTANYL CITRATE 25 MCG: 50 INJECTION, SOLUTION INTRAMUSCULAR; INTRAVENOUS at 12:01

## 2019-01-02 RX ADMIN — PROTAMINE SULFATE 65 MG: 10 INJECTION, SOLUTION INTRAVENOUS at 12:01

## 2019-01-02 RX ADMIN — SODIUM CHLORIDE: 0.9 INJECTION, SOLUTION INTRAVENOUS at 11:01

## 2019-01-02 RX ADMIN — FENTANYL CITRATE 50 MCG: 50 INJECTION, SOLUTION INTRAMUSCULAR; INTRAVENOUS at 11:01

## 2019-01-02 RX ADMIN — DEXTROSE 2 G: 50 INJECTION, SOLUTION INTRAVENOUS at 11:01

## 2019-01-02 RX ADMIN — FENTANYL CITRATE 25 MCG: 50 INJECTION, SOLUTION INTRAMUSCULAR; INTRAVENOUS at 11:01

## 2019-01-02 RX ADMIN — MIDAZOLAM HYDROCHLORIDE 1 MG: 1 INJECTION, SOLUTION INTRAMUSCULAR; INTRAVENOUS at 12:01

## 2019-01-02 RX ADMIN — HEPARIN SODIUM 8000 UNITS: 5000 INJECTION, SOLUTION INTRAVENOUS; SUBCUTANEOUS at 12:01

## 2019-01-02 RX ADMIN — PROPOFOL 40 MG: 10 INJECTION, EMULSION INTRAVENOUS at 12:01

## 2019-01-02 NOTE — ANESTHESIA POSTPROCEDURE EVALUATION
"Anesthesia Post Evaluation    Patient: Emanuel Daniel    Procedure(s) Performed: Procedure(s) (LRB):  ARTERIOGRAM-LEG AND ULTRASOUND (Right)    Final Anesthesia Type: MAC  Patient location during evaluation: PACU  Patient participation: Yes- Able to Participate  Level of consciousness: awake and alert and oriented  Post-procedure vital signs: reviewed and stable  Pain management: adequate  Airway patency: patent  PONV status at discharge: No PONV  Anesthetic complications: no      Cardiovascular status: hemodynamically stable  Respiratory status: unassisted, spontaneous ventilation and room air  Hydration status: euvolemic  Follow-up not needed.        Visit Vitals  BP (!) 149/69   Pulse 70   Temp 35.9 °C (96.7 °F) (Oral)   Resp 16   Ht 5' 9" (1.753 m)   Wt 83.9 kg (185 lb)   SpO2 (!) 93%   BMI 27.32 kg/m²       Pain/Gilmar Score: No Data Recorded      "

## 2019-01-02 NOTE — BRIEF OP NOTE
[facility]  Brief Operative Note     SUMMARY     Surgery Date: 1/2/2019     Surgeon(s) and Role:     * GINA Vuong III, MD - Primary     * René Rodriguez MD - Fellow    Assisting Surgeon: None    Pre-op Diagnosis:  Atherosclerosis [I70.90]  Ischemic rest pain of lower extremity [I73.9]    Post-op Diagnosis:  Post-Op Diagnosis Codes:     * Atherosclerosis [I70.90]     * Ischemic rest pain of lower extremity [I73.9]    PROCEDURES:    1. Bilateral PTA, common iliac artery (8x40)  2. RIGHT EXTERNAL iilac PTA (7x40)  3. CO2 aortagram and Jone LE runoff  4. US guided jone CFA access    Anesthesia: Local MAC    Description of the findings of the procedure: jone 6 fr CFA access/mynx closure; 8 cc visipaque, remainder CO2; 5.1 min fluoro; 753 mGy    Findings/Key Components: 80+% Jone CI ISR, and 80% R ext iliac stenosis,  <10% residual CI, ~20% residual R Ext iliac  Jone flush SFA occlusion with AK pop reconstitution    Estimated Blood Loss: <5cc         Specimens:   Specimen (12h ago, onward)    None          Discharge Note    SUMMARY     Admit Date: 1/2/2019    Discharge Date and Time: 1/2/19    Hospital Course (synopsis of major diagnoses, care, treatment, and services provided during the course of the hospital stay): successful outpatient procedure    Final Diagnosis: Post-Op Diagnosis Codes:     * Atherosclerosis [I70.90]     * Ischemic rest pain of lower extremity [I73.9]    Disposition: home    Follow Up/Patient Instructions: Diet: renal  Act: ad sang  FU: 2 week with KENDRICK    Medications: pre-op

## 2019-01-02 NOTE — TRANSFER OF CARE
"Anesthesia Transfer of Care Note    Patient: Emanuel Daniel    Procedure(s) Performed: Procedure(s) (LRB):  ARTERIOGRAM-LEG AND ULTRASOUND (Right)    Patient location: PACU    Anesthesia Type: MAC    Transport from OR: Transported from OR on 2-3 L/min O2 by NC with adequate spontaneous ventilation    Post pain: adequate analgesia    Post assessment: no apparent anesthetic complications    Post vital signs: stable    Level of consciousness: awake    Nausea/Vomiting: no nausea/vomiting    Complications: none    Transfer of care protocol was followed      Last vitals:   Visit Vitals  BP (!) 166/77   Pulse 71   Temp 35.9 °C (96.7 °F) (Oral)   Resp 18   Ht 5' 9" (1.753 m)   Wt 83.9 kg (185 lb)   SpO2 96%   BMI 27.32 kg/m²     "

## 2019-01-02 NOTE — DISCHARGE INSTRUCTIONS
Peripheral Angiography  Peripheral angiography is an outpatient procedure that makes a map of the vessels (arteries) in your lower body, legs, and arms, using X-ray and dye.This map can show where blood flow may be blocked.  Talk with your healthcare provider about the risks and complications of angiography.   Before the procedure  Prepare for the peripheral angiography as follows:   · Tell your healthcare provider about all medicines you take and any allergies you may have.  · Follow any directions youre given for not eating or drinking before the procedure. If your provider says to take your normal medicines, swallow them with only small sips of water.  · Arrange for a family member or friend to drive you home.  During the procedure  Here is what to expect:  ·   You may get medicine through an IV (intravenous) line to relax you. Youre given an injection to numb the insertion site. Then, a tiny skin cut (incision) is made near an artery in your groin.  · Your provider inserts a thin tube (catheter) through the incision. He or she then threads the catheter into an artery while looking at a video monitor.  · Contrast dye is injected into the catheter to confirm position. You may feel warmth or pressure in your legs and back. You lie still as X-rays are taken. The catheter is then taken out.  After the procedure  Youll be taken to a recovery area. A healthcare provider will apply pressure to the site for about 10 minutes. Your healthcare provider will tell you how long to lie down and keep the insertion site still. Your healthcare provider will discuss the results with you soon after the procedure.  Back at home  On the day you get home, dont drive, dont exercise, avoid walking and taking stairs, and avoid bending and lifting. Your healthcare provider may give you other care instructions.  Call your healthcare provider  Call your healthcare provider right away if:  · You notice a lump or bleeding at the  insertion site  · You feel pain at the insertion site  · You become lightheaded or dizzy  · You have leg pain or numbness  · You do not urinate in 8 hours    Date Last Reviewed: 5/1/2016  © 6746-0346 [employer]. [address] [place] [address], [place] [address]. All rights reserved. This information is not intended as a substitute for professional medical care. Always follow your healthcare professional's instructions.

## 2019-01-02 NOTE — INTERVAL H&P NOTE
The patient has been examined and the H&P has been reviewed:    I concur with the findings and no changes have occurred since H&P was written.    Anesthesia/Surgery risks, benefits and alternative options discussed and understood by patient/family.          Active Hospital Problems    Diagnosis  POA    Peripheral arterial disease [I73.9]  Yes      Resolved Hospital Problems   No resolved problems to display.

## 2019-01-02 NOTE — PROGRESS NOTES
Continuing to maintain bedrest per order.  VSS,  no complaints of discomfort.  Neurovascular assessments performed and documented at regular intervals.  BLE warm to touch with palpable pulses.  Bilateral groin sites clean, dry and intact.  Patient AAOx4, easily arousable but catching up on sleep during his bedrest.  Quiet environment promoted.  WCTM.

## 2019-01-03 NOTE — OP NOTE
Date: 01/02/2019    Surgeon: BREANN Vuong III, MD    Assistant: René Rodriguez MD    Pre-op Diagnosis:   Atherosclerosis [I70.90]  Ischemic rest pain of lower extremity [I73.9]    Post-op Diagnosis: Same    Procedures:   1. Bilateral PTA, common iliac artery (8x40)  2. RIGHT EXTERNAL iilac PTA (7x40)  3. CO2 aortagram and Vipin LE runoff  4. US guided vipin CFA access    Anesthesia: Local MAC    EBL: Minimal    Anesthesia: Local/Sedation    Findings:   vipin 6 fr CFA access/mynx closure; 8 cc visipaque, remainder CO2; 5.1 min fluoro; 753 mGy     80+% Vipin CI ISR, and 80% R ext iliac stenosis,  <10% residual CI, ~20% residual R Ext iliac  Vipin flush SFA occlusion with AK pop reconstitution    Indications: 63 y M with history of bilateral iliac kissing stents presents with RLE rest pain and diminished femoral pulses     Description of Procedure:  After an informed consent was obtained the patient was brought to the OR and placed in the supine position. Both the patient and procedure were confirmed and identified during timeout process. The patient received perioperative antibiotics. The patient's bilateral groins were prepped and draped in usual sterile fashion. Using ultrasound guidance, the L common femoral artery was entered with a 21-G needle, Mandril wire and 4-Fr micropuncture needle. Sheathogram demonstrated access in the CFA. Then under fluoroscopic guidance, an 0.035-in wire was placed into the distal aorta. The micropuncture dilator was then exchanged over the wire for a 5-Cambodian sheath. The patient was systemically heparinized the patient with 6000u of heparin.  Next a VCF-catheter was placed over the wire and into the distal aorta under fluoroscopy and a CO2 aortogram was performed which demonstrated 80% bilateral common iliac in stent restenosis, as well as an 80% R external iliac stenosis.     The R Common femoral artery was entered with a 21-G needle, Mandril wire and 4-Fr micropuncture needle. Of note, the  patient had a high bifurcation, as seen on prior angiogram. Sheathogram demonstrated access in the CFA. An 0.035 wire was advanced to the distal aorta and the sheath upsized to 6 Fr. The R external iliac artery was treated with a 7x40 dorado (20 connor 2 min). 20% residual stenosis was noted. The R common iliac was then treated with an 8x40 mm balloon (20 connor 2 min). 100% residual stenosis was noted.    The L CFA sheath was then upsized to 6 Fr, and the L common iliac artery was treated with an 8x40 mm dorado balloon (20 connor 2 min). 10% residual stenosis was noted. A runoff of each lower extremity was performed showing:    L SFA: flush occlusion  L pop: reconstitution of AK pop    R SFA flush occlusion  R pop: reconstitution of AK pop    Heparin was reversed with 25 units of protamine. We then deployed a 5-Turkmen Mynx closure device in bilateral CFA without issue. At the conclusion of the case the patient was noted to have no evidence of a groin hematoma. All instrument and sponge counts were correct at the end of the case. The patient tolerated the procedure well and was transferred to the pacu for further recovery.     Complications:  None; patient tolerated the procedure well.    Disposition: Stable to recovery      Dr. Vuong was present for the procedure    René Rodriguez MD   Fellow, Vascular/Endovascular Surgery

## 2019-01-16 ENCOUNTER — TELEPHONE (OUTPATIENT)
Dept: VASCULAR SURGERY | Facility: CLINIC | Age: 64
End: 2019-01-16

## 2019-01-16 DIAGNOSIS — I73.9 PVD (PERIPHERAL VASCULAR DISEASE): Primary | ICD-10-CM

## 2019-01-16 NOTE — TELEPHONE ENCOUNTER
Brother Bill states pt needs FU appt with Dr. Vuong. Appts scheduled, pt's brother verified. Appt letter placed in mail.

## 2019-01-28 ENCOUNTER — TELEPHONE (OUTPATIENT)
Dept: VASCULAR SURGERY | Facility: CLINIC | Age: 64
End: 2019-01-28

## 2019-01-28 NOTE — TELEPHONE ENCOUNTER
Pt states he is feeling fine and would like to cancel his FU appt with Dr. Vuong. Notified patient he is scheduled for a FU so that Dr. Vuong may evaluate his blood flow and the success of his procedure. Pt verbalizes understanding.

## 2019-01-29 ENCOUNTER — OFFICE VISIT (OUTPATIENT)
Dept: VASCULAR SURGERY | Facility: CLINIC | Age: 64
End: 2019-01-29
Payer: MEDICARE

## 2019-01-29 ENCOUNTER — HOSPITAL ENCOUNTER (OUTPATIENT)
Dept: VASCULAR SURGERY | Facility: CLINIC | Age: 64
Discharge: HOME | End: 2019-01-29
Attending: SURGERY
Payer: MEDICARE

## 2019-01-29 VITALS
HEART RATE: 70 BPM | HEIGHT: 69 IN | DIASTOLIC BLOOD PRESSURE: 56 MMHG | WEIGHT: 171.94 LBS | SYSTOLIC BLOOD PRESSURE: 106 MMHG | BODY MASS INDEX: 25.47 KG/M2 | TEMPERATURE: 98 F

## 2019-01-29 DIAGNOSIS — I70.229 ATHEROSCLEROTIC PERIPHERAL VASCULAR DISEASE WITH REST PAIN: Primary | ICD-10-CM

## 2019-01-29 DIAGNOSIS — I73.9 PVD (PERIPHERAL VASCULAR DISEASE): ICD-10-CM

## 2019-01-29 PROCEDURE — 3074F PR MOST RECENT SYSTOLIC BLOOD PRESSURE < 130 MM HG: ICD-10-PCS | Mod: CPTII,S$GLB,, | Performed by: SURGERY

## 2019-01-29 PROCEDURE — 99214 OFFICE O/P EST MOD 30 MIN: CPT | Mod: S$GLB,,, | Performed by: SURGERY

## 2019-01-29 PROCEDURE — 3008F BODY MASS INDEX DOCD: CPT | Mod: CPTII,S$GLB,, | Performed by: SURGERY

## 2019-01-29 PROCEDURE — 3078F DIAST BP <80 MM HG: CPT | Mod: CPTII,S$GLB,, | Performed by: SURGERY

## 2019-01-29 PROCEDURE — 3078F PR MOST RECENT DIASTOLIC BLOOD PRESSURE < 80 MM HG: ICD-10-PCS | Mod: CPTII,S$GLB,, | Performed by: SURGERY

## 2019-01-29 PROCEDURE — 99999 PR PBB SHADOW E&M-EST. PATIENT-LVL III: ICD-10-PCS | Mod: PBBFAC,,, | Performed by: SURGERY

## 2019-01-29 PROCEDURE — 93923 UPR/LXTR ART STDY 3+ LVLS: CPT | Mod: S$GLB,,, | Performed by: SURGERY

## 2019-01-29 PROCEDURE — 99214 PR OFFICE/OUTPT VISIT, EST, LEVL IV, 30-39 MIN: ICD-10-PCS | Mod: S$GLB,,, | Performed by: SURGERY

## 2019-01-29 PROCEDURE — 3074F SYST BP LT 130 MM HG: CPT | Mod: CPTII,S$GLB,, | Performed by: SURGERY

## 2019-01-29 PROCEDURE — 3008F PR BODY MASS INDEX (BMI) DOCUMENTED: ICD-10-PCS | Mod: CPTII,S$GLB,, | Performed by: SURGERY

## 2019-01-29 PROCEDURE — 99999 PR PBB SHADOW E&M-EST. PATIENT-LVL III: CPT | Mod: PBBFAC,,, | Performed by: SURGERY

## 2019-01-29 PROCEDURE — 93923 PR NON-INVASIVE PHYSIOLOGIC STUDY EXTREMITY 3 LEVELS: ICD-10-PCS | Mod: S$GLB,,, | Performed by: SURGERY

## 2019-01-29 NOTE — PROGRESS NOTES
See my prior note; review of systems, family history and social history are   unchanged.    REFERRING PHYSICIAN:  Dr. RICK Argueta    HISTORY OF PRESENT ILLNESS:  A 63-year-old male status post:    1. Vipin iliac PTA 1/19/19 (mostly CO2, 8 cc visipaque only)  1.  Bilateral kissing iliac stent placement, iCAST on the right, 03/31/2016.  2.  Urgent right carotid endarterectomy, 01/05/2015, done for highly symptomatic   greater 99% stenosis with multiple episodes of amaurosis and right cortical   TIA.    His ischemic rest pain is gone, and he can walk much farther.    He denies stroke, TIA or amaurosis.    PAST MEDICAL HISTORY:  1.  Known coronary artery disease, status post CABG approximately five years   ago.  He does admit to some chest pain with exertion.  2.  Known advanced stage III-IV chronic kidney disease, most recent creatinine   2.1 with GFR of 32.  This is better than his recent baseline.    SOCIAL HISTORY:  Continues smoking one pack per day.    MEDICATIONS:  Include Plavix, statin and Pletal.    PHYSICAL EXAMINATION:  VITAL SIGNS:  See nursing note.  ABDOMEN:  Soft, tender.  EXTREMITIES:  Femoral pulses are 1+ on the right, trace palpable on the left.    Pedal pulses are not palpable.    IMAGING:  ABIs are 0.74 (prior 0.47 on the right and 0.45 (prior 0.42 on the left, compared with 0.70 on   the right and 0.79 on the left 2-1/2 years ago.    Prior Carotid duplex reveals no recurrent right carotid stenosis.    However, on the left, there appears to be a high-grade proximal common carotid   artery stenosis with peak velocity of 497, mid common carotid velocity of 370.    The ICA does not appear particularly stenotic.    Recent creatinine 2.1 and GFR of 32.    His prior arteriogram from 2016 was personally rereviewed.  At that time, he   underwent a CO2 mostly arteriogram with bilateral kissing iliac stent placement.    Angiogram was personally reviewed:  ISR of Vipin Common iliacs R> L.  vipin SFA  occlusion    ASSESSMENT:  1. Imporvement in Vipin PAD R> L with signif symptom imporvement   2.  Significant what appears to be fairly diffuse left common carotid artery   stenosis, although clinically asymptomatic with no TIA or stroke.  Cont medical Rx  3.  Exertional chest pain.  4.  Continued smoking.    I had a long discussion with him about the importance of smoking cessation and   that continued smoking will clearly take years off his life with a higher risk   of stroke, MI or need for leg amputation.  He sounds like he is willing to   consider attempts at reducing his smoking.    PLAN:  1.  Stop smoking as detailed above.  Greater than three minutes were spent with   this.  2. Cont DAPT, statin, exercise program  3.  Suggest consideration of Cardiology evaluation of his chest pain.  However,   given his significant renal insufficiency, this is quite problematic, as he is   unlikely to tolerate a large dye load.    JUAN JOSE Vuong III, MD, FACS  Professor and Chief, Vascular and Endovascular Surgery  CC:

## 2019-03-01 ENCOUNTER — HOSPITAL ENCOUNTER (INPATIENT)
Facility: HOSPITAL | Age: 64
LOS: 3 days | Discharge: HOME | DRG: 280 | End: 2019-03-04
Attending: EMERGENCY MEDICINE | Admitting: INTERNAL MEDICINE
Payer: MEDICARE

## 2019-03-01 DIAGNOSIS — G93.40 ENCEPHALOPATHY ACUTE: ICD-10-CM

## 2019-03-01 DIAGNOSIS — N17.9 AKI (ACUTE KIDNEY INJURY): ICD-10-CM

## 2019-03-01 DIAGNOSIS — R41.0 CONFUSION: ICD-10-CM

## 2019-03-01 DIAGNOSIS — I21.4 NSTEMI (NON-ST ELEVATED MYOCARDIAL INFARCTION): Primary | ICD-10-CM

## 2019-03-01 PROBLEM — G93.41 ENCEPHALOPATHY, METABOLIC: Status: ACTIVE | Noted: 2019-03-01

## 2019-03-01 LAB
ALBUMIN SERPL BCP-MCNC: 3.7 G/DL
ALP SERPL-CCNC: 126 U/L
ALT SERPL W/O P-5'-P-CCNC: 10 U/L
AMMONIA PLAS-SCNC: 21 UMOL/L
AMPHET+METHAMPHET UR QL: NEGATIVE
ANION GAP SERPL CALC-SCNC: 15 MMOL/L
APTT BLDCRRT: 26.9 SEC
AST SERPL-CCNC: 39 U/L
BARBITURATES UR QL SCN>200 NG/ML: NEGATIVE
BASOPHILS # BLD AUTO: 0.01 K/UL
BASOPHILS NFR BLD: 0.2 %
BENZODIAZ UR QL SCN>200 NG/ML: NORMAL
BILIRUB SERPL-MCNC: 0.5 MG/DL
BILIRUB UR QL STRIP: NEGATIVE
BNP SERPL-MCNC: 775 PG/ML
BUN SERPL-MCNC: 71 MG/DL (ref 6–30)
BUN SERPL-MCNC: 78 MG/DL
BZE UR QL SCN: NEGATIVE
CALCIUM SERPL-MCNC: 9.5 MG/DL
CANNABINOIDS UR QL SCN: NEGATIVE
CHLORIDE SERPL-SCNC: 103 MMOL/L
CHLORIDE SERPL-SCNC: 105 MMOL/L (ref 95–110)
CK SERPL-CCNC: 1194 U/L
CLARITY UR REFRACT.AUTO: CLEAR
CO2 SERPL-SCNC: 18 MMOL/L
COLOR UR AUTO: YELLOW
CREAT SERPL-MCNC: 5.9 MG/DL
CREAT SERPL-MCNC: 5.9 MG/DL (ref 0.5–1.4)
CREAT UR-MCNC: 194 MG/DL
DIFFERENTIAL METHOD: ABNORMAL
EOSINOPHIL # BLD AUTO: 0.2 K/UL
EOSINOPHIL NFR BLD: 2.8 %
ERYTHROCYTE [DISTWIDTH] IN BLOOD BY AUTOMATED COUNT: 15 %
EST. GFR  (AFRICAN AMERICAN): 10.9 ML/MIN/1.73 M^2
EST. GFR  (NON AFRICAN AMERICAN): 9.4 ML/MIN/1.73 M^2
ETHANOL SERPL-MCNC: <10 MG/DL
GLUCOSE SERPL-MCNC: 86 MG/DL
GLUCOSE SERPL-MCNC: 86 MG/DL (ref 70–110)
GLUCOSE UR QL STRIP: NEGATIVE
HCT VFR BLD AUTO: 39.9 %
HCT VFR BLD CALC: 39 %PCV (ref 36–54)
HGB BLD-MCNC: 12.4 G/DL
HGB UR QL STRIP: NEGATIVE
IMM GRANULOCYTES # BLD AUTO: 0.02 K/UL
IMM GRANULOCYTES NFR BLD AUTO: 0.4 %
INR PPP: 1
KETONES UR QL STRIP: ABNORMAL
LACTATE SERPL-SCNC: 0.7 MMOL/L
LEUKOCYTE ESTERASE UR QL STRIP: NEGATIVE
LIPASE SERPL-CCNC: 12 U/L
LYMPHOCYTES # BLD AUTO: 1.3 K/UL
LYMPHOCYTES NFR BLD: 24.3 %
MAGNESIUM SERPL-MCNC: 1.9 MG/DL
MCH RBC QN AUTO: 27.3 PG
MCHC RBC AUTO-ENTMCNC: 31.1 G/DL
MCV RBC AUTO: 88 FL
METHADONE UR QL SCN>300 NG/ML: NEGATIVE
MONOCYTES # BLD AUTO: 0.4 K/UL
MONOCYTES NFR BLD: 6.6 %
NEUTROPHILS # BLD AUTO: 3.5 K/UL
NEUTROPHILS NFR BLD: 65.7 %
NITRITE UR QL STRIP: NEGATIVE
NRBC BLD-RTO: 0 /100 WBC
OPIATES UR QL SCN: NORMAL
PCP UR QL SCN>25 NG/ML: NEGATIVE
PH UR STRIP: 5 [PH] (ref 5–8)
PHOSPHATE SERPL-MCNC: 6.8 MG/DL
PLATELET # BLD AUTO: 120 K/UL
PMV BLD AUTO: 11.5 FL
POC IONIZED CALCIUM: 1.2 MMOL/L (ref 1.06–1.42)
POC TCO2 (MEASURED): 20 MMOL/L (ref 23–29)
POTASSIUM BLD-SCNC: 5.3 MMOL/L (ref 3.5–5.1)
POTASSIUM SERPL-SCNC: 5.3 MMOL/L
PROT SERPL-MCNC: 7.3 G/DL
PROT UR QL STRIP: NEGATIVE
PROTHROMBIN TIME: 10 SEC
RBC # BLD AUTO: 4.54 M/UL
SAMPLE: ABNORMAL
SODIUM BLD-SCNC: 139 MMOL/L (ref 136–145)
SODIUM SERPL-SCNC: 136 MMOL/L
SP GR UR STRIP: 1.01 (ref 1–1.03)
TOXICOLOGY INFORMATION: NORMAL
TROPONIN I SERPL DL<=0.01 NG/ML-MCNC: 14.09 NG/ML
TSH SERPL DL<=0.005 MIU/L-ACNC: 1.17 UIU/ML
URN SPEC COLLECT METH UR: ABNORMAL
WBC # BLD AUTO: 5.34 K/UL

## 2019-03-01 PROCEDURE — 84484 ASSAY OF TROPONIN QUANT: CPT

## 2019-03-01 PROCEDURE — 81003 URINALYSIS AUTO W/O SCOPE: CPT

## 2019-03-01 PROCEDURE — 12000002 HC ACUTE/MED SURGE SEMI-PRIVATE ROOM

## 2019-03-01 PROCEDURE — 82140 ASSAY OF AMMONIA: CPT

## 2019-03-01 PROCEDURE — 80307 DRUG TEST PRSMV CHEM ANLYZR: CPT

## 2019-03-01 PROCEDURE — 99291 PR CRITICAL CARE, E/M 30-74 MINUTES: ICD-10-PCS | Mod: ,,, | Performed by: EMERGENCY MEDICINE

## 2019-03-01 PROCEDURE — 96365 THER/PROPH/DIAG IV INF INIT: CPT

## 2019-03-01 PROCEDURE — 84100 ASSAY OF PHOSPHORUS: CPT

## 2019-03-01 PROCEDURE — 99291 CRITICAL CARE FIRST HOUR: CPT | Mod: ,,, | Performed by: EMERGENCY MEDICINE

## 2019-03-01 PROCEDURE — 80053 COMPREHEN METABOLIC PANEL: CPT

## 2019-03-01 PROCEDURE — 96375 TX/PRO/DX INJ NEW DRUG ADDON: CPT

## 2019-03-01 PROCEDURE — 83605 ASSAY OF LACTIC ACID: CPT

## 2019-03-01 PROCEDURE — 96361 HYDRATE IV INFUSION ADD-ON: CPT

## 2019-03-01 PROCEDURE — 25000003 PHARM REV CODE 250: Performed by: PHYSICIAN ASSISTANT

## 2019-03-01 PROCEDURE — 83690 ASSAY OF LIPASE: CPT

## 2019-03-01 PROCEDURE — 63600175 PHARM REV CODE 636 W HCPCS: Performed by: PHYSICIAN ASSISTANT

## 2019-03-01 PROCEDURE — 85610 PROTHROMBIN TIME: CPT

## 2019-03-01 PROCEDURE — 87040 BLOOD CULTURE FOR BACTERIA: CPT

## 2019-03-01 PROCEDURE — 80320 DRUG SCREEN QUANTALCOHOLS: CPT

## 2019-03-01 PROCEDURE — 84443 ASSAY THYROID STIM HORMONE: CPT

## 2019-03-01 PROCEDURE — 83880 ASSAY OF NATRIURETIC PEPTIDE: CPT

## 2019-03-01 PROCEDURE — 85730 THROMBOPLASTIN TIME PARTIAL: CPT

## 2019-03-01 PROCEDURE — 87040 BLOOD CULTURE FOR BACTERIA: CPT | Mod: 59

## 2019-03-01 PROCEDURE — 99291 CRITICAL CARE FIRST HOUR: CPT | Mod: 25

## 2019-03-01 PROCEDURE — 82550 ASSAY OF CK (CPK): CPT

## 2019-03-01 PROCEDURE — 83735 ASSAY OF MAGNESIUM: CPT

## 2019-03-01 PROCEDURE — 85025 COMPLETE CBC W/AUTO DIFF WBC: CPT

## 2019-03-01 RX ORDER — LORAZEPAM 2 MG/ML
0.5 INJECTION INTRAMUSCULAR
Status: COMPLETED | OUTPATIENT
Start: 2019-03-01 | End: 2019-03-01

## 2019-03-01 RX ORDER — CLOPIDOGREL BISULFATE 75 MG/1
75 TABLET ORAL DAILY
Status: DISCONTINUED | OUTPATIENT
Start: 2019-03-02 | End: 2019-03-04 | Stop reason: HOSPADM

## 2019-03-01 RX ORDER — IBUPROFEN 200 MG
16 TABLET ORAL
Status: DISCONTINUED | OUTPATIENT
Start: 2019-03-01 | End: 2019-03-04 | Stop reason: HOSPADM

## 2019-03-01 RX ORDER — ATORVASTATIN CALCIUM 20 MG/1
80 TABLET, FILM COATED ORAL DAILY
Status: DISCONTINUED | OUTPATIENT
Start: 2019-03-02 | End: 2019-03-01

## 2019-03-01 RX ORDER — GLUCAGON 1 MG
1 KIT INJECTION
Status: DISCONTINUED | OUTPATIENT
Start: 2019-03-01 | End: 2019-03-04 | Stop reason: HOSPADM

## 2019-03-01 RX ORDER — ONDANSETRON 8 MG/1
8 TABLET, ORALLY DISINTEGRATING ORAL EVERY 8 HOURS PRN
Status: DISCONTINUED | OUTPATIENT
Start: 2019-03-01 | End: 2019-03-04 | Stop reason: HOSPADM

## 2019-03-01 RX ORDER — SODIUM CHLORIDE 0.9 % (FLUSH) 0.9 %
5 SYRINGE (ML) INJECTION
Status: DISCONTINUED | OUTPATIENT
Start: 2019-03-01 | End: 2019-03-04 | Stop reason: HOSPADM

## 2019-03-01 RX ORDER — ASPIRIN 81 MG/1
81 TABLET ORAL DAILY
Status: DISCONTINUED | OUTPATIENT
Start: 2019-03-02 | End: 2019-03-04 | Stop reason: HOSPADM

## 2019-03-01 RX ORDER — CARVEDILOL 12.5 MG/1
25 TABLET ORAL 2 TIMES DAILY WITH MEALS
Status: DISCONTINUED | OUTPATIENT
Start: 2019-03-02 | End: 2019-03-02

## 2019-03-01 RX ORDER — IBUPROFEN 200 MG
24 TABLET ORAL
Status: DISCONTINUED | OUTPATIENT
Start: 2019-03-01 | End: 2019-03-04 | Stop reason: HOSPADM

## 2019-03-01 RX ORDER — CLOPIDOGREL 300 MG/1
600 TABLET, FILM COATED ORAL
Status: COMPLETED | OUTPATIENT
Start: 2019-03-01 | End: 2019-03-01

## 2019-03-01 RX ORDER — ASPIRIN 325 MG
325 TABLET, DELAYED RELEASE (ENTERIC COATED) ORAL
Status: COMPLETED | OUTPATIENT
Start: 2019-03-01 | End: 2019-03-01

## 2019-03-01 RX ORDER — AMLODIPINE BESYLATE 10 MG/1
10 TABLET ORAL DAILY
Status: DISCONTINUED | OUTPATIENT
Start: 2019-03-02 | End: 2019-03-02

## 2019-03-01 RX ORDER — SODIUM CHLORIDE 0.9 % (FLUSH) 0.9 %
5 SYRINGE (ML) INJECTION
Status: DISCONTINUED | OUTPATIENT
Start: 2019-03-01 | End: 2019-03-01

## 2019-03-01 RX ORDER — HEPARIN SODIUM,PORCINE/D5W 25000/250
12 INTRAVENOUS SOLUTION INTRAVENOUS CONTINUOUS
Status: DISCONTINUED | OUTPATIENT
Start: 2019-03-01 | End: 2019-03-04 | Stop reason: HOSPADM

## 2019-03-01 RX ADMIN — LORAZEPAM 0.5 MG: 2 INJECTION INTRAMUSCULAR; INTRAVENOUS at 09:03

## 2019-03-01 RX ADMIN — ASPIRIN 325 MG: 325 TABLET, DELAYED RELEASE ORAL at 09:03

## 2019-03-01 RX ADMIN — SODIUM CHLORIDE 1000 ML: 0.9 INJECTION, SOLUTION INTRAVENOUS at 09:03

## 2019-03-01 RX ADMIN — SODIUM CHLORIDE 1000 ML: 0.9 INJECTION, SOLUTION INTRAVENOUS at 07:03

## 2019-03-01 RX ADMIN — HEPARIN SODIUM AND DEXTROSE 12 UNITS/KG/HR: 10000; 5 INJECTION INTRAVENOUS at 10:03

## 2019-03-01 RX ADMIN — CLOPIDOGREL BISULFATE 600 MG: 300 TABLET, FILM COATED ORAL at 09:03

## 2019-03-02 PROBLEM — N18.30 ACUTE RENAL FAILURE SUPERIMPOSED ON STAGE 3 CHRONIC KIDNEY DISEASE: Status: ACTIVE | Noted: 2019-03-01

## 2019-03-02 LAB
ALBUMIN SERPL BCP-MCNC: 3.1 G/DL
ALBUMIN SERPL BCP-MCNC: 3.1 G/DL
ALP SERPL-CCNC: 102 U/L
ALT SERPL W/O P-5'-P-CCNC: 11 U/L
AMMONIA PLAS-SCNC: 27 UMOL/L
ANION GAP SERPL CALC-SCNC: 11 MMOL/L
ANION GAP SERPL CALC-SCNC: 13 MMOL/L
APTT BLDCRRT: 38.4 SEC
APTT BLDCRRT: 43.2 SEC
APTT BLDCRRT: 51.3 SEC
ASCENDING AORTA: 3.45 CM
AST SERPL-CCNC: 47 U/L
AV INDEX (PROSTH): 0.88
AV MEAN GRADIENT: 3.5 MMHG
AV PEAK GRADIENT: 7.29 MMHG
AV VALVE AREA: 2.73 CM2
AV VELOCITY RATIO: 0.84
BASOPHILS # BLD AUTO: 0.01 K/UL
BASOPHILS NFR BLD: 0.3 %
BILIRUB SERPL-MCNC: 0.4 MG/DL
BSA FOR ECHO PROCEDURE: 1.94 M2
BUN SERPL-MCNC: 67 MG/DL
BUN SERPL-MCNC: 72 MG/DL
CALCIUM SERPL-MCNC: 8.6 MG/DL
CALCIUM SERPL-MCNC: 9.3 MG/DL
CHLORIDE SERPL-SCNC: 109 MMOL/L
CHLORIDE SERPL-SCNC: 112 MMOL/L
CK SERPL-CCNC: 1099 U/L
CO2 SERPL-SCNC: 17 MMOL/L
CO2 SERPL-SCNC: 18 MMOL/L
CREAT SERPL-MCNC: 4.2 MG/DL
CREAT SERPL-MCNC: 5 MG/DL
CV ECHO LV RWT: 0.46 CM
DIFFERENTIAL METHOD: ABNORMAL
DOP CALC AO PEAK VEL: 1.35 M/S
DOP CALC AO VTI: 25.04 CM
DOP CALC LVOT AREA: 3.11 CM2
DOP CALC LVOT DIAMETER: 1.99 CM
DOP CALC LVOT PEAK VEL: 1.13 M/S
DOP CALC LVOT STROKE VOLUME: 68.48 CM3
DOP CALCLVOT PEAK VEL VTI: 22.03 CM
ECHO LV POSTERIOR WALL: 1.22 CM (ref 0.6–1.1)
EOSINOPHIL # BLD AUTO: 0.2 K/UL
EOSINOPHIL NFR BLD: 3.8 %
ERYTHROCYTE [DISTWIDTH] IN BLOOD BY AUTOMATED COUNT: 15.2 %
EST. GFR  (AFRICAN AMERICAN): 13.3 ML/MIN/1.73 M^2
EST. GFR  (AFRICAN AMERICAN): 16.4 ML/MIN/1.73 M^2
EST. GFR  (NON AFRICAN AMERICAN): 11.5 ML/MIN/1.73 M^2
EST. GFR  (NON AFRICAN AMERICAN): 14.2 ML/MIN/1.73 M^2
FRACTIONAL SHORTENING: 37 % (ref 28–44)
GLUCOSE SERPL-MCNC: 80 MG/DL
GLUCOSE SERPL-MCNC: 97 MG/DL
HCT VFR BLD AUTO: 35 %
HGB BLD-MCNC: 10.9 G/DL
IMM GRANULOCYTES # BLD AUTO: 0.01 K/UL
IMM GRANULOCYTES NFR BLD AUTO: 0.3 %
INTERVENTRICULAR SEPTUM: 1.15 CM (ref 0.6–1.1)
LA MAJOR: 5.49 CM
LA MINOR: 4.86 CM
LA WIDTH: 3.08 CM
LEFT ATRIUM SIZE: 3.63 CM
LEFT ATRIUM VOLUME INDEX: 25.4 ML/M2
LEFT ATRIUM VOLUME: 49 CM3
LEFT INTERNAL DIMENSION IN SYSTOLE: 3.34 CM (ref 2.1–4)
LEFT VENTRICLE DIASTOLIC VOLUME INDEX: 69.37 ML/M2
LEFT VENTRICLE DIASTOLIC VOLUME: 134.06 ML
LEFT VENTRICLE MASS INDEX: 129.7 G/M2
LEFT VENTRICLE SYSTOLIC VOLUME INDEX: 23.6 ML/M2
LEFT VENTRICLE SYSTOLIC VOLUME: 45.57 ML
LEFT VENTRICULAR INTERNAL DIMENSION IN DIASTOLE: 5.28 CM (ref 3.5–6)
LEFT VENTRICULAR MASS: 250.62 G
LYMPHOCYTES # BLD AUTO: 1.5 K/UL
LYMPHOCYTES NFR BLD: 37.8 %
MAGNESIUM SERPL-MCNC: 2 MG/DL
MCH RBC QN AUTO: 27.6 PG
MCHC RBC AUTO-ENTMCNC: 31.1 G/DL
MCV RBC AUTO: 89 FL
MONOCYTES # BLD AUTO: 0.3 K/UL
MONOCYTES NFR BLD: 7.1 %
NEUTROPHILS # BLD AUTO: 2 K/UL
NEUTROPHILS NFR BLD: 50.7 %
NRBC BLD-RTO: 0 /100 WBC
PHOSPHATE SERPL-MCNC: 4.8 MG/DL
PHOSPHATE SERPL-MCNC: 5.7 MG/DL
PLATELET # BLD AUTO: 100 K/UL
PMV BLD AUTO: 11.5 FL
POTASSIUM SERPL-SCNC: 4.9 MMOL/L
POTASSIUM SERPL-SCNC: 5.2 MMOL/L
PROT SERPL-MCNC: 6 G/DL
RA MAJOR: 4.83 CM
RA PRESSURE: 3 MMHG
RA WIDTH: 3.19 CM
RBC # BLD AUTO: 3.95 M/UL
RIGHT VENTRICULAR END-DIASTOLIC DIMENSION: 3.28 CM
SINUS: 4.09 CM
SODIUM SERPL-SCNC: 138 MMOL/L
SODIUM SERPL-SCNC: 142 MMOL/L
STJ: 3.42 CM
TDI LATERAL: 0.12
TDI SEPTAL: 0.09
TDI: 0.11
TRICUSPID ANNULAR PLANE SYSTOLIC EXCURSION: 2.33 CM
TROPONIN I SERPL DL<=0.01 NG/ML-MCNC: 31.12 NG/ML
TROPONIN I SERPL DL<=0.01 NG/ML-MCNC: 34.4 NG/ML
TROPONIN I SERPL DL<=0.01 NG/ML-MCNC: 38.3 NG/ML
TROPONIN I SERPL DL<=0.01 NG/ML-MCNC: 39.9 NG/ML
WBC # BLD AUTO: 3.92 K/UL

## 2019-03-02 PROCEDURE — 36415 COLL VENOUS BLD VENIPUNCTURE: CPT

## 2019-03-02 PROCEDURE — 80053 COMPREHEN METABOLIC PANEL: CPT

## 2019-03-02 PROCEDURE — 85730 THROMBOPLASTIN TIME PARTIAL: CPT

## 2019-03-02 PROCEDURE — 84484 ASSAY OF TROPONIN QUANT: CPT | Mod: 91

## 2019-03-02 PROCEDURE — 84100 ASSAY OF PHOSPHORUS: CPT

## 2019-03-02 PROCEDURE — 99223 1ST HOSP IP/OBS HIGH 75: CPT | Mod: ,,, | Performed by: INTERNAL MEDICINE

## 2019-03-02 PROCEDURE — 80069 RENAL FUNCTION PANEL: CPT

## 2019-03-02 PROCEDURE — 11000001 HC ACUTE MED/SURG PRIVATE ROOM

## 2019-03-02 PROCEDURE — 96366 THER/PROPH/DIAG IV INF ADDON: CPT

## 2019-03-02 PROCEDURE — 84484 ASSAY OF TROPONIN QUANT: CPT

## 2019-03-02 PROCEDURE — 83735 ASSAY OF MAGNESIUM: CPT

## 2019-03-02 PROCEDURE — 93010 ELECTROCARDIOGRAM REPORT: CPT | Mod: ,,, | Performed by: INTERNAL MEDICINE

## 2019-03-02 PROCEDURE — 25000003 PHARM REV CODE 250: Performed by: INTERNAL MEDICINE

## 2019-03-02 PROCEDURE — 93005 ELECTROCARDIOGRAM TRACING: CPT

## 2019-03-02 PROCEDURE — 99223 PR INITIAL HOSPITAL CARE,LEVL III: ICD-10-PCS | Mod: ,,, | Performed by: INTERNAL MEDICINE

## 2019-03-02 PROCEDURE — 82550 ASSAY OF CK (CPK): CPT

## 2019-03-02 PROCEDURE — 85025 COMPLETE CBC W/AUTO DIFF WBC: CPT

## 2019-03-02 PROCEDURE — 85730 THROMBOPLASTIN TIME PARTIAL: CPT | Mod: 91

## 2019-03-02 PROCEDURE — 93010 EKG 12-LEAD: ICD-10-PCS | Mod: ,,, | Performed by: INTERNAL MEDICINE

## 2019-03-02 PROCEDURE — 63600175 PHARM REV CODE 636 W HCPCS: Performed by: PHYSICIAN ASSISTANT

## 2019-03-02 PROCEDURE — 96365 THER/PROPH/DIAG IV INF INIT: CPT

## 2019-03-02 PROCEDURE — 25000003 PHARM REV CODE 250: Performed by: HOSPITALIST

## 2019-03-02 PROCEDURE — 82140 ASSAY OF AMMONIA: CPT

## 2019-03-02 RX ORDER — LORAZEPAM 2 MG/ML
2 INJECTION INTRAMUSCULAR
Status: DISCONTINUED | OUTPATIENT
Start: 2019-03-02 | End: 2019-03-02

## 2019-03-02 RX ORDER — SODIUM CHLORIDE 9 MG/ML
INJECTION, SOLUTION INTRAVENOUS CONTINUOUS
Status: ACTIVE | OUTPATIENT
Start: 2019-03-02 | End: 2019-03-02

## 2019-03-02 RX ORDER — LORAZEPAM 2 MG/ML
1 INJECTION INTRAMUSCULAR
Status: DISCONTINUED | OUTPATIENT
Start: 2019-03-02 | End: 2019-03-04 | Stop reason: HOSPADM

## 2019-03-02 RX ORDER — SODIUM BICARBONATE 650 MG/1
1300 TABLET ORAL 2 TIMES DAILY
Status: DISCONTINUED | OUTPATIENT
Start: 2019-03-02 | End: 2019-03-04 | Stop reason: HOSPADM

## 2019-03-02 RX ORDER — ATORVASTATIN CALCIUM 20 MG/1
80 TABLET, FILM COATED ORAL ONCE
Status: DISCONTINUED | OUTPATIENT
Start: 2019-03-02 | End: 2019-03-04 | Stop reason: HOSPADM

## 2019-03-02 RX ORDER — ATORVASTATIN CALCIUM 20 MG/1
80 TABLET, FILM COATED ORAL DAILY
Status: DISCONTINUED | OUTPATIENT
Start: 2019-03-03 | End: 2019-03-04 | Stop reason: HOSPADM

## 2019-03-02 RX ORDER — CARVEDILOL 12.5 MG/1
12.5 TABLET ORAL 2 TIMES DAILY WITH MEALS
Status: DISCONTINUED | OUTPATIENT
Start: 2019-03-02 | End: 2019-03-04 | Stop reason: HOSPADM

## 2019-03-02 RX ORDER — SODIUM BICARBONATE 650 MG/1
650 TABLET ORAL ONCE
Status: DISCONTINUED | OUTPATIENT
Start: 2019-03-02 | End: 2019-03-02

## 2019-03-02 RX ADMIN — SODIUM CHLORIDE: 0.9 INJECTION, SOLUTION INTRAVENOUS at 11:03

## 2019-03-02 RX ADMIN — HEPARIN SODIUM AND DEXTROSE 14 UNITS/KG/HR: 10000; 5 INJECTION INTRAVENOUS at 02:03

## 2019-03-02 RX ADMIN — SODIUM BICARBONATE 650 MG TABLET 1300 MG: at 08:03

## 2019-03-02 RX ADMIN — SODIUM BICARBONATE 650 MG TABLET 1300 MG: at 12:03

## 2019-03-02 RX ADMIN — CARVEDILOL 12.5 MG: 12.5 TABLET, FILM COATED ORAL at 04:03

## 2019-03-02 NOTE — ED NOTES
Pt awake and alert; resting quietly on stretcher.  Pt remains on continuous cardiac and pulse ox monitoring with non-invasive blood pressure to cycle every 30 minutes.  VS stable; NSR noted. Pt denies pain at this time; no acute distress or discomfort reported or observed.  Pt denies restroom needs at this time; is able to reposition self on stretcher. Bed locked in lowest position; side rails up and locked x 2; call light, bedside table, and personal belongings within reach. Room assessed for safety measures and cleanliness; no action needed at this time.  Admission orders received.  Pt updated on admission process; awaiting bed assignment.  Pt instructed to alert nurse for assistance and before attempting to get out of bed; verbalizes understanding. Pt denies needs or complaints at this time.  Friends remain at bedside; will continue to monitor.

## 2019-03-02 NOTE — PROGRESS NOTES
"[facility]  Cardiology  Consult Note    Patient Name: Emanuel Daniel  MRN: [id number]  Admission Date: 3/1/2019  Hospital Length of Stay: 0 days  Code Status: Prior   Attending Physician: Ugo Harvey, *   Primary Care Physician: Tom Diamond MD  Expected Discharge Date:   Principal Problem:<principal problem not specified>    Subjective:     Hospital Course:   No notes on file    Interval History:   Mr Daniel is a 64 yo man with hx of CAD s/p 1V CABG (LIMA-LAD) s/p PCI to ostial LM (Mr [id number]) with flash ballooning and PCI to LCX/OM1 (2.91kfy01uj, 2.37eta83qk, 2.28slh86ii) 2013, pad S/P REIA stent ([id number]), HTN, HLD, active tobacco use, EtOH use (3-4 beers per day who presents to the ED with AMS. He was found sleeping in a recliner and appeared lethargic. Patient would only "grunt" to physical stimuli. Patient's friend thought he may have taken pain killers or drank too much at the time and wanted him to sleep it off. Patient continued be lethargic today which led him to call 911.   Upon ED presentation, he was found to be uremic with Cr 5.7 and BUN 72. EKG unchanged from prior with non specific T wave abnormalities and troponin of 14.Patient denied angina, palpitations or diaphoresis.     Review of Systems   Constitution: Positive for weakness.   HENT: Negative.    Eyes: Negative.    Cardiovascular: Negative for chest pain, claudication, cyanosis, dyspnea on exertion, irregular heartbeat, near-syncope, orthopnea, palpitations, paroxysmal nocturnal dyspnea and syncope.   Respiratory: Negative for cough, hemoptysis, shortness of breath, sleep disturbances due to breathing, snoring, sputum production and wheezing.    Endocrine: Negative.    Skin: Negative for color change and nail changes.   Gastrointestinal: Negative.    Genitourinary: Negative.    Neurological: Positive for light-headedness. Negative for aphonia, brief paralysis, difficulty with concentration, " disturbances in coordination, excessive daytime sleepiness, dizziness, focal weakness and headaches.     Objective:     Vital Signs (Most Recent):  Temp: 98.5 °F (36.9 °C) (03/01/19 1659)  Pulse: 82 (03/01/19 2010)  Resp: (!) 22 (03/01/19 2010)  BP: (!) 100/54 (03/01/19 2013)  SpO2: (!) 93 % (03/01/19 2010) Vital Signs (24h Range):  Temp:  [98.5 °F (36.9 °C)] 98.5 °F (36.9 °C)  Pulse:  [82-94] 82  Resp:  [17-22] 22  SpO2:  [93 %-97 %] 93 %  BP: (100-117)/(54-73) 100/54        There is no height or weight on file to calculate BMI.     SpO2: (!) 93 %  O2 Device (Oxygen Therapy): nasal cannula    No intake or output data in the 24 hours ending 03/01/19 2046    Lines/Drains/Airways     Peripheral Intravenous Line                 Peripheral IV - Single Lumen 03/01/19 1904 Left Antecubital less than 1 day                Physical Exam   Constitutional: He is oriented to person, place, and time. He appears well-developed and well-nourished.   HENT:   Head: Normocephalic and atraumatic.   Eyes: Conjunctivae are normal.   Neck: Neck supple. No JVD present. No thyromegaly present.   Cardiovascular: Normal rate, regular rhythm and normal heart sounds. Exam reveals no gallop and no friction rub.   No murmur heard.  Pulmonary/Chest: Effort normal and breath sounds normal. No respiratory distress. He has no wheezes. He has no rales.   Abdominal: Soft. Bowel sounds are normal. He exhibits no distension. There is no tenderness. There is no rebound.   Neurological: He is oriented to person, place, and time.   Skin: Skin is warm and dry.   Nursing note and vitals reviewed.      Significant Labs:   BMP:   Recent Labs   Lab 03/01/19  1857   GLU 86      K 5.3*      CO2 18*   BUN 78*   CREATININE 5.9*   CALCIUM 9.5   MG 1.9   , CMP   Recent Labs   Lab 03/01/19  1857      K 5.3*      CO2 18*   GLU 86   BUN 78*   CREATININE 5.9*   CALCIUM 9.5   PROT 7.3   ALBUMIN 3.7   BILITOT 0.5   ALKPHOS 126   AST 39   ALT 10    ANIONGAP 15   ESTGFRAFRICA 10.9*   EGFRNONAA 9.4*   , Lipid Panel No results for input(s): CHOL, HDL, LDLCALC, TRIG, CHOLHDL in the last 48 hours., Troponin   Recent Labs   Lab 03/01/19  1857   TROPONINI 14.089*   , All pertinent lab results from the last 24 hours have been reviewed. and   Recent Lab Results       03/01/19  1935   03/01/19  1911   03/01/19  1903   03/01/19  1857        Benzodiazepines Presumptive Positive           Methadone metabolites Negative           Phencyclidine Negative           Immature Granulocytes       0.4     Immature Grans (Abs)       0.02  Comment:  Mild elevation in immature granulocytes is non specific and   can be seen in a variety of conditions including stress response,   acute inflammation, trauma and pregnancy. Correlation with other   laboratory and clinical findings is essential.       Albumin       3.7     Alcohol, Medical, Serum       <10     Alkaline Phosphatase       126     ALT       10     Ammonia       21     Amphetamine Screen, Ur Negative           Anion Gap       15     Appearance, UA Clear           AST       39     Barbiturate Screen, Ur Negative           Baso #       0.01     Basophil%       0.2     Bilirubin (UA) Negative           Total Bilirubin       0.5  Comment:  For infants and newborns, interpretation of results should be based  on gestational age, weight and in agreement with clinical  observations.  Premature Infant recommended reference ranges:  Up to 24 hours.............<8.0 mg/dL  Up to 48 hours............<12.0 mg/dL  3-5 days..................<15.0 mg/dL  6-29 days.................<15.0 mg/dL       BNP       775  Comment:  Values of less than 100 pg/ml are consistent with non-CHF populations.     BUN, Bld       78     Calcium       9.5     Chloride       103     CO2       18     Cocaine (Metab.) Negative           Color, UA Yellow           CPK     1194       Creatinine       5.9     Creatinine, Random Ur 194.0  Comment:  The random urine  reference ranges provided were established   for 24 hour urine collections.  No reference ranges exist for  random urine specimens.  Correlate clinically.             Differential Method       Automated     eGFR if        10.9     eGFR if non        9.4  Comment:  Calculation used to obtain the estimated glomerular filtration  rate (eGFR) is the CKD-EPI equation.        Eos #       0.2     Eosinophil%       2.8     Glucose       86     Glucose, UA Negative           Gran # (ANC)       3.5     Gran%       65.7     Hematocrit       39.9     Hemoglobin       12.4     Coumadin Monitoring INR       1.0  Comment:  Coumadin Therapy:  2.0 - 3.0 for INR for all indicators except mechanical heart valves  and antiphospholipid syndromes which should use 2.5 - 3.5.       Ketones, UA Trace           Lactate, Dom       0.7  Comment:  Falsely low lactic acid results can be found in samples   containing >=13.0 mg/dL total bilirubin and/or >=3.5 mg/dL   direct bilirubin.       Leukocytes, UA Negative           Lipase       12     Lymph #       1.3     Lymph%       24.3     Magnesium       1.9     MCH       27.3     MCHC       31.1     MCV       88     Mono #       0.4     Mono%       6.6     MPV       11.5     Nitrite, UA Negative           nRBC       0     Occult Blood UA Negative           Opiate Scrn, Ur Presumptive Positive           pH, UA 5.0           Phosphorus       6.8     Platelets       120     POC BUN   71         POC Chloride   105         POC Creatinine   5.9         POC Glucose   86         POC Hematocrit   39         POC Ionized Calcium   1.20         POC Potassium   5.3         POC Sodium   139         POC TCO2 (MEASURED)   20         Potassium       5.3  Comment:  *No Visible Hemolysis     Total Protein       7.3     Protein, UA Negative  Comment:  Recommend a 24 hour urine protein or a urine   protein/creatinine ratio if globulin induced proteinuria is  clinically suspected.              Protime       10.0     RBC       4.54     RDW       15.0     Sample   ABRIL         Sodium       136     Specific [place], UA 1.010           Specimen UA Urine, Clean Catch           Marijuana (THC) Metabolite Negative           Toxicology Information SEE COMMENT  Comment:  This screen includes the following classes of drugs at the   listed cut-off:  Benzodiazepines                  200 ng/ml  Methadone                        300 ng/ml  Cocaine metabolite               300 ng/ml  Opiates                          300 ng/ml  Barbiturates                     200 ng/ml  Amphetamines                    1000 ng/ml  Marijuana metabs (THC)            50 ng/ml  Phencyclidine (PCP)               25 ng/ml  High concentrations of Diphenhydramine may cross-react with  Phencyclidine PCP screening immunoassay giving a false   positive result.  High concentrations of Methylenedioxymethamphetamine (MDMA aka  Ectasy) and other structurally similar compounds may cross-   react with the Amphetamine/Methamphetamine screening   immunoassay giving a false positive result.  A metabolite of the anti-HIV drug Sustiva () may cause  false positive results in the Marijuana metabolite (THC)   screening assay.  Note: This exception list includes only more common   interferants in toxicology screen testing.  Because of many   cross-reactantspositive results on toxicology drug screens   should be confirmed whenever results do not correlate with   clinical presentation.  This report is intended for use in clinical monitoring and  management of patients. It is not intended for use in   employment related drug testing.  Because of any cross-reactants, positive results on toxicology  drug screens should be confirmed whenever results do not  correlate with clinical presentation.  Presumptive positive results are unconfirmed and may be used   only for medical purposes.             Troponin I       14.089  Comment:  The reference interval for  Troponin I represents the 99th percentile   cutoff   for our facility and is consistent with 3rd generation assay   performance.       TSH       1.165     WBC       5.34           Significant Imaging: Echocardiogram:   2D echo with color flow doppler:   Results for orders placed or performed during the hospital encounter of 10/04/14   2D echo with color flow doppler   Result Value Ref Range    QEF 65 55 - 65    Mitral Valve Regurgitation MILD     Diastolic Dysfunction Yes (A)     Aortic Valve Regurgitation MILD     and Transthoracic echo (TTE) complete (Cupid Only): No results found for this or any previous visit. and X-Ray: CXR: X-Ray Chest 1 View (CXR): No results found for this visit on 03/01/19. and X-Ray Chest PA and Lateral (CXR): No results found for this visit on 03/01/19.    Assessment and Plan:         NSTEMI (non-ST elevated myocardial infarction)    -Patient is totally asymptomatic from the cardiac standpoint in the setting of worsening of renal function and uremia   -Admit to medicine team J/C  -Start heparin drip and load with  mg and plavix 600 mg po daily  -Consult nephrology for symptomatic uremia and possible hemodialysis  -Treat NSTEMI conservatively at the moment and will follow up the patient for onset of symptoms and clinical instability  -2D echo with CFD in the morning           VTE Risk Mitigation (From admission, onward)    None          Leonidas Daniel MD  Cardiology  [facility]

## 2019-03-02 NOTE — ASSESSMENT & PLAN NOTE
63 year old male reportedly found altered at home with unknown down-time. NSTEMI diagnosis on current admission. Cardiology following and pt on ASA/Plavix/Hep gtt.  Nephrology is consulted for evaluation of MICHAEL on CKD3 with mental status changes concerning for uremia. Pt also has notable elevations of CPK.     -- MICHAEL on admission with Cr of 5.9 and BUN at 78.  -- Baseline Cr ~ 2.1  -- Hyperkalemia of 5.3   -- NAGMA with bicarb of 18.  -- Lactic acid negative  -- Hyperphosphatemic at 6.8   -- UA is unemarkable  -- Findings c/w hemodynamic rhabdomyolysis  -- CPK elevated at 1194 down-trending to 1099    Plan  -- Coreg (BB's in general) not an ideal choice for pt's renal function at this time with borderline BP as low as 89/54 noted; however, aware of cardiac indication.   -- If mental status does not improve, we will have low threshold to initiate HD with concern for worsening uremia.   -- Recommend mir catheter placement. Pt is adamantly refusing however strict I/O's at this time are necessary. At minimum would recommend BID bladder scans to assess for urinary production/retention.  -- Repeat 4pm Renal Function Panels.   -- BID Renal function panels (Cr does appear to be improving, as is currently at 5)  -- Continue bicarbonate replacement  -- Low K diet

## 2019-03-02 NOTE — HOSPITAL COURSE
Mr. Daniel presented with NSTEMI, MICHAEL on CKD4, and metabolic encephalopathy likely due to uremia. He was managed medically for NSTEMI with 48hr heparin gtt, plavix, ASA, coreg, and atorvastatin. Troponin peak at 39 without any recurrence of chest pain or discomfort. He was initially bolused 2L NS in ED, then further rehydrated with a slow drip with improvement in renal function to baseline. Mentation improved as well during this time. He had initially been hypotensive on presentation and home clonidine, HCTZ, ACE, and amlodipine were held. No angiography performed while in house due to concerns for precipitation of further renal failure. He will be discharged on medical therapy and have outpatient f/u with Interventional Cardiology and Nephrology with 1 week BMP. For problem based discussion, see below.    Vitals:    03/04/19 0400 03/04/19 0500 03/04/19 0852 03/04/19 1109   BP:  (!) 144/74 (!) 157/81    BP Location:       Patient Position:       Pulse:  74 108 68   Resp:  19 16    Temp:       TempSrc:       SpO2:  96% 96%    Weight: 75.7 kg (166 lb 14.2 oz)      Height:           Physical Exam   Constitutional: He is oriented to person, place, and time. No distress.   Disheveled appearing    Eyes: Pupils are equal, round, and reactive to light.   Cardiovascular: Normal rate and regular rhythm.   No murmur heard.  Pulmonary/Chest: Effort normal and breath sounds normal. No respiratory distress. He has no wheezes.   Abdominal: Soft. Bowel sounds are normal. He exhibits no distension. There is no tenderness.   Musculoskeletal: He exhibits no edema or tenderness.   Neurological: He is alert and oriented to person, place, and time.   - asterixis   Skin: Skin is warm and dry. Capillary refill takes less than 2 seconds. No rash noted. He is not diaphoretic. No erythema.

## 2019-03-02 NOTE — PLAN OF CARE
Problem: Adult Inpatient Plan of Care  Goal: Plan of Care Review  Outcome: Ongoing (interventions implemented as appropriate)  Patient remained ins table condition this shift since I assumed care of the patient at 1300.  Heparin gtt increased at 1420 and new order for PTT was ordered for 2020.  NS continued at 75ml/hr.  Patient a little confused.  Telesitter at bedside.  Able to make needs known to staff.  Bed in lowest and locked position, call light in reach. Patient educated on importance of calling for help when needing to ambulate or go to the bathroom.  Verbalized understanding but needs reinforcement.

## 2019-03-02 NOTE — HPI
"Mr. Daniel is a 63-year-old man with HTN, HLD, tobacco abuse, EtOH abuse, CAD s/p 1V CABG (LIMA-LAD) s/p PCI to ostial LM (Mr 3.49prs0fx) with flash ballooning and PCI to LCX/OM1 (2.15okk11es, 2.34rpm21ah, 2.80pih73gg) 2013, pad S/P REIA stent ([id number]) who presented with confusion. The following history was obtained largely from chart review as he is currently incapable of more than "yes/no" responses after prolonged delays. He was in his usual state of health until yesterday, when he found asleep by his friend and was confused to date/time. His friend thought he was sleepy from a pain medication or too much alcohol, so he let him sleep it off. When Mr. Daniel was persistently confused in the morning and minimally communicative, the friend brought him to the ED. His mental status cleared somewhat to the extent that he very clearly denies any chest pain, shortness of breath, palpitations, syncope, and edema. He agrees that his PO intake has been decreased lately but cannot provide any additional information     On arrival here he is hemodynamically stable though with some low normal BP measurements. Lab workup significant for MICHAEL with creatinine 5.9, BUN 78, hyperkalemia with K 5.3, NAGMA with bicarb 18, and hyperphosphatemia at 6.8. His troponin was elevated at 14, and his CPK at 1194. ECG unchanged from prior with NSTWA. Cardiology was consulted, who recommended medical therapy for NSTEMI given his lack of symptoms and worsening renal function   "

## 2019-03-02 NOTE — ASSESSMENT & PLAN NOTE
- CT head without acute changes, though it does show sequela of remote infarct and a remote lacunar infarction  - No evidence of infection. Blood cultures ordered in abundance of caution  - No other signs of autonomic instability to suggest alcohol withdrawal, and he seemed to worsen with benzodiazapine administration in the ED  - Serum ammonia levels ordered though suspicion for HE low  - Tox screen negative  - Suspect uremia from MICHAEL on CKD as a major contributor. Nephrology consulted regarding potential HD and contacted by the ED provider  - For now will avoid anticholinergic medications and monitor closely

## 2019-03-02 NOTE — ED NOTES
Pt resting quietly on stretcher with eyes closed; opens eyes to verbal stimuli. Pt remains on continuous cardiac and pulse ox monitoring with non-invasive blood pressure to cycle every 30 minutes.  VS stable; NSR noted. Pt denies pain at this time; no acute distress or discomfort reported or observed.  Pt assisted to standing position to for urination in urinal; unsteady gait noted.  Urine specimen obtained.  Pt assisted back onto stretcher; repositioned on stretcher. Bed locked in lowest position; side rails up and locked x 2; call light, bedside table, and personal belongings within reach. Room assessed for safety measures and cleanliness; no action needed at this time. Plan of care discussed.  Pt instructed to alert nurse for assistance and before attempting to get out of bed; verbalizes understanding. Pt denies needs or complaints at this time; will continue to monitor.

## 2019-03-02 NOTE — CONSULTS
"[facility]  Nephrology  Consult Note    Patient Name: Emanuel Daniel  MRN: [id number]  Admission Date: 3/1/2019  Hospital Length of Stay: 1 days  Attending Provider: Ranjit Bob, *   Primary Care Physician: Tom Diamond MD  Principal Problem:NSTEMI (non-ST elevated myocardial infarction)    Inpatient consult to Nephrology  Consult performed by: Jassi Jamil MD  Consult ordered by: Raffi Merino MD        Subjective:     HPI: Mr. Daniel is a 63-year-old man with HTN, HLD, tobacco abuse, EtOH abuse, CAD s/p 1V CABG (LIMA-LAD) s/p PCI to ostial LM (Mr 3.45dux0nq) with flash ballooning and PCI to LCX/OM1 (2.16fnw06dd, 2.01dzn09pg, 2.48pzg11ni) 2013, pad S/P REIA stent ([id number]) who presented with confusion. The following history was obtained largely from chart review as he is currently incapable of more than "yes/no" responses after prolonged delays. He was in his usual state of health until yesterday, when he found asleep by his friend and was confused to date/time. His friend thought he was sleepy from a pain medication or too much alcohol, so he let him sleep it off. When Mr. Daniel was persistently confused in the morning and minimally communicative, the friend brought him to the ED. His mental status cleared somewhat to the extent that he very clearly denies any chest pain, shortness of breath, palpitations, syncope, and edema. He agrees that his PO intake has been decreased lately but cannot provide any additional information     On arrival here he is hemodynamically stable though with some low normal BP measurements. Lab workup significant for MICHAEL with creatinine 5.9, BUN 78, hyperkalemia with K 5.3, NAGMA with bicarb 18, and hyperphosphatemia at 6.8. His troponin was elevated at 14, and his CPK at 1194. ECG unchanged from prior with NSTWA. Cardiology was consulted, who recommended medical therapy for NSTEMI given his lack of symptoms and worsening renal " function      Past Medical History:   Diagnosis Date    Alcohol abuse     Angina of effort     Arthritis     CAD (coronary artery disease)     Carotid artery stenosis, symptomatic 12/30/2014    Coronary artery disease     Gout     Hernia     HTN (hypertension)     Hyperlipidemia     Myocardial infarct, old     Smoker active       Past Surgical History:   Procedure Laterality Date    ANGIOGRAM-EXTREMITY-LOWER Bilateral 3/31/2016    Performed by GINA Vuong III, MD at [facility] OR [address] [place] [address]    ANGIOGRAM-RENAL Bilateral 7/30/2014    Performed by Inocencio Gotti MD at [facility] CATH LAB    ANGIOPLASTY      ANGIOPLASTY WITH STENT PLACEMENT Bilateral 3/31/2016    Performed by GINA Vuong III, MD at [facility] OR [address] [place] [address]    AORTOGRAM Bilateral 3/31/2016    Performed by GINA Vuong III, MD at [facility] OR [address] [place] [address]    ARTERIOGRAM-LEG AND ULTRASOUND Right 1/2/2019    Performed by GINA Vuong III, MD at [facility] OR [address] [place] [address]    CARDIAC CATHETERIZATION      CARDIAC CATHETERIZATION      CATHETERIZATION, HEART, LEFT N/A 8/30/2013    Performed by Inocencio Gotti MD at [facility] CATH LAB    CORONARY ANGIOPLASTY      CORONARY ARTERY BYPASS GRAFT      CORONARY ARTERY BYPASS GRAFT (CABG) N/A 8/26/2013    Performed by Pj Duff MD at [facility] OR [address] [place] [address]    ENDARTERECTOMY-CAROTID Right 1/5/2015    Performed by GINA Vuong III, MD at [facility] OR [address] [place] [address]    HEMORRHOID SURGERY      INSERTION, STENT, CORONARY ARTERY N/A 8/7/2013    Performed by Inocencio Gotti MD at [facility] CATH LAB    PTA (ANGIOPLASTY, PERCUTANEOUS, TRANSLUMINAL) N/A 1/2/2019    Performed by GINA Vuong III, MD at [facility] OR [address] [place] [address]    TONSILLECTOMY         Review of patient's allergies indicates:   Allergen Reactions    Iodine and iodide containing products      Anaphylactic rxn      Current Facility-Administered Medications   Medication Frequency    0.9%  NaCl infusion Continuous    aspirin EC tablet 81 mg Daily    [START ON 3/3/2019] atorvastatin  tablet 80 mg Daily    atorvastatin tablet 80 mg Once    carvedilol tablet 12.5 mg BID WM    clopidogrel tablet 75 mg Daily    dextrose 50% injection 12.5 g PRN    dextrose 50% injection 25 g PRN    glucagon (human recombinant) injection 1 mg PRN    glucose chewable tablet 16 g PRN    glucose chewable tablet 24 g PRN    heparin 25,000 units in dextrose 5% (100 units/ml) IV bolus from bag - ADDITIONAL PRN BOLUS - 30 units/kg (max bolus 4000 units) PRN    heparin 25,000 units in dextrose 5% (100 units/ml) IV bolus from bag - ADDITIONAL PRN BOLUS - 60 units/kg (max bolus 4000 units) PRN    heparin 25,000 units in dextrose 5% 250 mL (100 units/mL) infusion LOW INTENSITY nomogram - [facility] Continuous    lorazepam injection 1 mg Q2H PRN    ondansetron disintegrating tablet 8 mg Q8H PRN    sodium bicarbonate tablet 1,300 mg BID    sodium chloride 0.9% flush 5 mL PRN     Family History     Problem Relation (Age of Onset)    Clotting disorder Sister    Heart attack Father    Heart disease Father    Heart failure Father    Hypertension Father, Sister, Paternal Grandfather    No Known Problems Mother, Brother, Maternal Grandmother, Maternal Grandfather, Paternal Grandmother, Maternal Aunt, Maternal Uncle, Paternal Aunt, Paternal Uncle    Stroke Sister        Tobacco Use    Smoking status: Current Every Day Smoker     Packs/day: 1.00    Smokeless tobacco: Former User   Substance and Sexual Activity    Alcohol use: Yes     Alcohol/week: 100.8 oz     Types: 168 Cans of beer per week     Comment: 3-4 cans of beer a day    Drug use: No    Sexual activity: Not on file     Review of Systems   Unable to perform ROS: Mental status change     Objective:     Vital Signs (Most Recent):  Temp: 98 °F (36.7 °C) (03/02/19 1142)  Pulse: 74 (03/02/19 1142)  Resp: 18 (03/02/19 1142)  BP: 109/64 (03/02/19 1142)  SpO2: 95 % (03/02/19 1142)  O2 Device (Oxygen Therapy): room air (03/02/19 1142) Vital Signs (24h Range):  Temp:  [97.4  °F (36.3 °C)-98.5 °F (36.9 °C)] 98 °F (36.7 °C)  Pulse:  [74-98] 74  Resp:  [17-22] 18  SpO2:  [93 %-98 %] 95 %  BP: ()/(50-73) 109/64     Weight: 77.6 kg (171 lb) (03/02/19 0922)  Body mass index is 25.25 kg/m².  Body surface area is 1.94 meters squared.    I/O last 3 completed shifts:  In: 2000 [IV Piggyback:2000]  Out: -     Physical Exam   Constitutional: No distress.   Disheveled appearing    Eyes: Pupils are equal, round, and reactive to light.   Cardiovascular: Normal rate and regular rhythm.   No murmur heard.  Pulmonary/Chest: Effort normal and breath sounds normal. No respiratory distress. He has no wheezes.   Abdominal: Soft. Bowel sounds are normal. He exhibits no distension. There is no tenderness.   Musculoskeletal: He exhibits no edema or tenderness.   Neurological:   Somnolent but awakens easily to verbal stimuli  Oriented to self only   Skin: Skin is warm and dry. Capillary refill takes less than 2 seconds. No rash noted. He is not diaphoretic. No erythema.       Significant Labs:  All labs within the past 24 hours have been reviewed.      Assessment/Plan:     Acute renal failure superimposed on stage 3 chronic kidney disease    63 year old male reportedly found altered at home with unknown down-time. NSTEMI diagnosis on current admission. Cardiology following and pt on ASA/Plavix/Hep gtt.  Nephrology is consulted for evaluation of MICHAEL on CKD3 with mental status changes concerning for uremia. Pt also has notable elevations of CPK.     -- MICHAEL on admission with Cr of 5.9 and BUN at 78.  -- Baseline Cr ~ 2.1  -- Hyperkalemia of 5.3   -- NAGMA with bicarb of 18.  -- Lactic acid negative  -- Hyperphosphatemic at 6.8   -- UA is unemarkable  -- Findings c/w hemodynamic rhabdomyolysis  -- CPK elevated at 1194 down-trending to 1099    Plan  -- Coreg (BB's in general) not an ideal choice for pt's renal function at this time with borderline BP as low as 89/54 noted; however, aware of cardiac indication.    -- If mental status does not improve, we will have low threshold to initiate HD with concern for worsening uremia.   -- Recommend mir catheter placement. Pt is adamantly refusing however strict I/O's at this time are necessary. At minimum would recommend BID bladder scans to assess for urinary production/retention.  -- Repeat 4pm Renal Function Panels.   -- BID Renal function panels (Cr does appear to be improving, as is currently at 5)  -- Continue bicarbonate replacement  -- Low K diet         Thank you for your consult. I will follow-up with patient. Please contact us if you have any additional questions.    Jassi Jamil MD  Nephrology  [facility]

## 2019-03-02 NOTE — ED NOTES
Pt returned from Ct and xray; replaced on continuous cardiac and pulse ox monitoring with blood pressure to cycle every 30 minutes.  VS stable; NSR noted.  Bed locked in lowest position; side rails up and locked x 2; call light, bedside table, and personal belongings within reach.  Pt informed of expected wait time for results; instructed to alert nurse for assistance and before attempting to get out of bed.  Pt verbalizes understanding.  Pt denies needs or complaints at this time; will continue to monitor pt.

## 2019-03-02 NOTE — ASSESSMENT & PLAN NOTE
- Complicated by NSTEMI as described above  - s/p 1V CABG (LIMA-LAD) s/p PCI to ostial LM (Mr 3.37nvs4ww) with flash ballooning and PCI to LCX/OM1 (2.67rse40vs, 2.62fok61ib, 2.15zaa82rn) 2013, pad S/P REIA stent ([id number])  - Continue asa, plavix, and carvedilol. Holding atorvastatin, lisinopril  - ECG with NSTWA  - Cardiology following as described above

## 2019-03-02 NOTE — ASSESSMENT & PLAN NOTE
- Known CAD (see below for details regarding interventions)  - Admission troponin 14, will trend Q6H. Admission ECG with NSTWA  - Treating conservatively with heparin gtt and DAPT load (completed)  - Otherwise continue home CAD regimen with coreg, asa, and plavix tomorrow. Reduced coreg to 12.5 given hypotension, holding lisinopril due to MICHAEL  - Had originally ordered atorvastatin 80 mg but received a call from the pharmacy concerning the possibility of rhabdo given elevated CPK. See MICHAEL below for more details  - TTE in the morning  - Cardiology following

## 2019-03-02 NOTE — ED NOTES
Pt resting quietly on stretcher with eyes closed; opens eyes to verbal stimuli.  Pt remains on continuous cardiac and pulse ox monitoring with non-invasive blood pressure to cycle every 30 minutes.  VS stable; NSR noted. Pt denies pain at this time; no acute distress or discomfort reported or observed.  Pt denies restroom needs at this time; urinal remains at bedside. Bed locked in lowest position; side rails up and locked x 2; call light, bedside table, and personal belongings within reach. Room assessed for safety measures and cleanliness; no action needed at this time  Plan of care discussed.  Pt instructed to alert nurse for assistance and before attempting to get out of bed; verbalizes understanding. Pt denies needs or complaints at this time.  Family friend at bedside; will continue to monitor.

## 2019-03-02 NOTE — ED NOTES
Telemetry Verification   Patient placed on Telemetry Box  Verified with War Room  Box # [id number]   Monitor Tech    Rate 80   Rhythm NSR

## 2019-03-02 NOTE — PLAN OF CARE
Confusion improved this am; suspect that patient initially presented with toxic encephalopathy given opioids/EtOH use in setting of renal disease. MICHAEL likely prerenal in setting of substance use and NSTEMI. Will give slow rehydration with NS and closely monitor respiratory status. Additionally, with some mild tremor this am; will dose with prn benzos for withdrawal (CIWAs ordered).    Trending troponins. No further chest pain or indication for urgent intervention. On heparin gtt, ASA, plavix, BB. Appreciate Cardiology and Nephrology assistance.    Ranjit Bob MD  [facility] Medicine  Pager: [phone]  Spectra: [phone]

## 2019-03-02 NOTE — ASSESSMENT & PLAN NOTE
-Patient is totally asymptomatic from the cardiac standpoint in the setting of worsening of renal function and uremia   -Admit to medicine team J/C  -Start heparin drip and load with  mg and plavix 600 mg po daily  -Consult nephrology for symptomatic uremia and possible hemodialysis  -Treat NSTEMI conservatively at the moment and will follow up the patient for onset of symptoms and clinical instability  -2D echo with CFD in the morning

## 2019-03-02 NOTE — H&P
"[facility] Medicine  History & Physical    Patient Name: Emanuel Daniel  MRN: [id number]  Admission Date: 3/1/2019  Attending Physician: Raffi Merino MD  Primary Care Provider: Tom Diamond MD    [facility] Medicine Team: [facility] MED C Raffi Merino MD     Patient information was obtained from patient and ER records.     Subjective:     Principal Problem:NSTEMI (non-ST elevated myocardial infarction)    Chief Complaint:   Chief Complaint   Patient presents with    Altered Mental Status     Pt presents to ED via EMS for confusion. Pt AAO to self and place. Confused on date which is out of his baseline per family. Denies fall/trauma. Denies blood thinner use/HTN. Pt normally ambulatory per family, but was unable to stand today.        HPI: Mr. Daniel is a 63-year-old man with HTN, HLD, tobacco abuse, EtOH abuse, CAD s/p 1V CABG (LIMA-LAD) s/p PCI to ostial LM (Mr 3.87wvt7bx) with flash ballooning and PCI to LCX/OM1 (2.45ivc42op, 2.08cje04bl, 2.26obc85gk) 2013, pad S/P REIA stent ([id number]) who presented with confusion. The following history was obtained largely from chart review as he is currently incapable of more than "yes/no" responses after prolonged delays. He was in his usual state of health until yesterday, when he found asleep by his friend and was confused to date/time. His friend thought he was sleepy from a pain medication or too much alcohol, so he let him sleep it off. When Mr. Daniel was persistently confused in the morning and minimally communicative, the friend brought him to the ED. His mental status cleared somewhat to the extent that he very clearly denies any chest pain, shortness of breath, palpitations, syncope, and edema. He agrees that his PO intake has been decreased lately but cannot provide any additional information    On arrival here he is hemodynamically stable though with some low normal BP measurements. Lab workup significant for MICHAEL with " creatinine 5.9, BUN 78, hyperkalemia with K 5.3, NAGMA with bicarb 18, and hyperphosphatemia at 6.8. His troponin was elevated at 14, and his CPK at 1194. ECG unchanged from prior with NSTWA. Cardiology was consulted, who recommended medical therapy for NSTEMI given his lack of symptoms and worsening renal function     Past Medical History:   Diagnosis Date    Alcohol abuse     Angina of effort     Arthritis     CAD (coronary artery disease)     Carotid artery stenosis, symptomatic 12/30/2014    Coronary artery disease     Gout     Hernia     HTN (hypertension)     Hyperlipidemia     Myocardial infarct, old     Smoker active       Past Surgical History:   Procedure Laterality Date    ANGIOGRAM-EXTREMITY-LOWER Bilateral 3/31/2016    Performed by GINA Vuong III, MD at [facility] OR [address] [place] [address]    ANGIOGRAM-RENAL Bilateral 7/30/2014    Performed by Inocencio Gotti MD at [facility] CATH LAB    ANGIOPLASTY      ANGIOPLASTY WITH STENT PLACEMENT Bilateral 3/31/2016    Performed by GINA Vuong III, MD at [facility] OR [address] [place] [address]    AORTOGRAM Bilateral 3/31/2016    Performed by GINA Vuong III, MD at [facility] OR [address] [place] [address]    ARTERIOGRAM-LEG AND ULTRASOUND Right 1/2/2019    Performed by GINA Vuong III, MD at [facility] OR [address] [place] [address]    CARDIAC CATHETERIZATION      CARDIAC CATHETERIZATION      CATHETERIZATION, HEART, LEFT N/A 8/30/2013    Performed by Inocencio Gotti MD at [facility] CATH LAB    CORONARY ANGIOPLASTY      CORONARY ARTERY BYPASS GRAFT      CORONARY ARTERY BYPASS GRAFT (CABG) N/A 8/26/2013    Performed by Pj Duff MD at [facility] OR [address] [place] [address]    ENDARTERECTOMY-CAROTID Right 1/5/2015    Performed by GINA Vuong III, MD at [facility] OR [address] [place] [address]    HEMORRHOID SURGERY      INSERTION, STENT, CORONARY ARTERY N/A 8/7/2013    Performed by Inocencio Gotti MD at [facility] CATH LAB    PTA (ANGIOPLASTY, PERCUTANEOUS, TRANSLUMINAL) N/A 1/2/2019    Performed by GINA Vuong III, MD at [facility] OR [address] [place] [address]    TONSILLECTOMY          Review of patient's allergies indicates:   Allergen Reactions    Iodine and iodide containing products      Anaphylactic rxn        No current facility-administered medications on file prior to encounter.      Current Outpatient Medications on File Prior to Encounter   Medication Sig    allopurinol (ZYLOPRIM) 100 MG tablet Take 100 mg by mouth once daily.     amLODIPine (NORVASC) 10 MG tablet amlodipine 10 mg tablet q HS    aspirin (ECOTRIN) 81 MG EC tablet Take 81 mg by mouth once daily.    atorvastatin (LIPITOR) 40 MG tablet atorvastatin 40 mg tablet    carvedilol (COREG) 25 MG tablet carvedilol 25 mg tablet TWO TIMES A DAY    cloNIDine (CATAPRES) 0.2 MG tablet clonidine HCl 0.2 mg tablet TWO TIMES A DAY    clopidogrel (PLAVIX) 75 mg tablet clopidogrel 75 mg tablet    hydroCHLOROthiazide (HYDRODIURIL) 25 MG tablet hydrochlorothiazide 25 mg tablet    lisinopril (PRINIVIL,ZESTRIL) 20 MG tablet lisinopril 20 mg tablet    LORazepam (ATIVAN) 0.5 MG tablet Ativan 0.5 mg tablet   Take 1 tablet by oral route at bedtime.    metoprolol tartrate (LOPRESSOR) 25 MG tablet metoprolol tartrate 25 mg tablet two times a day    nitroGLYCERIN (NITROSTAT) 0.4 MG SL tablet nitroglycerin 0.4 mg sublingual tablet     Family History     Problem Relation (Age of Onset)    Clotting disorder Sister    Heart attack Father    Heart disease Father    Heart failure Father    Hypertension Father, Sister, Paternal Grandfather    No Known Problems Mother, Brother, Maternal Grandmother, Maternal Grandfather, Paternal Grandmother, Maternal Aunt, Maternal Uncle, Paternal Aunt, Paternal Uncle    Stroke Sister        Tobacco Use    Smoking status: Current Every Day Smoker     Packs/day: 1.00    Smokeless tobacco: Former User   Substance and Sexual Activity    Alcohol use: Yes     Alcohol/week: 100.8 oz     Types: 168 Cans of beer per week     Comment: 3-4 cans of beer a day    Drug use: No    Sexual activity: Not on file  "    Review of Systems   Unable to perform ROS: Mental status change     Objective:     Vital Signs (Most Recent):  Temp: 98.5 °F (36.9 °C) (03/01/19 1659)  Pulse: 88 (03/01/19 2243)  Resp: (!) 22 (03/01/19 2243)  BP: 96/65 (03/01/19 2243)  SpO2: 95 % (03/01/19 2245) Vital Signs (24h Range):  Temp:  [98.5 °F (36.9 °C)] 98.5 °F (36.9 °C)  Pulse:  [82-98] 88  Resp:  [17-22] 22  SpO2:  [93 %-98 %] 95 %  BP: ()/(50-73) 96/65     Weight: 78 kg (171 lb 15.3 oz)  Body mass index is 25.39 kg/m².    Physical Exam   Constitutional: No distress.   Disheveled appearing    Eyes: Pupils are equal, round, and reactive to light.   Cardiovascular: Normal rate and regular rhythm.   No murmur heard.  Pulmonary/Chest: Effort normal and breath sounds normal. No respiratory distress. He has no wheezes.   Abdominal: Soft. Bowel sounds are normal. He exhibits no distension. There is no tenderness.   Musculoskeletal: He exhibits no edema or tenderness.   Neurological:   Somnolent but awakens easily to verbal stimuli  Oriented to self only  Follows one-step commands, albeit slowly   Skin: Skin is warm and dry. Capillary refill takes less than 2 seconds. No rash noted. He is not diaphoretic. No erythema.         CRANIAL NERVES     CN III, IV, VI   Pupils are equal, round, and reactive to light.       Significant Labs:     CBC:  Recent Labs   Lab 03/01/19 1857 03/01/19 1911   WBC 5.34  --    GRAN 65.7  3.5  --    HGB 12.4*  --    HCT 39.9* 39   *  --        Chem 10:  Recent Labs   Lab 03/01/19 1857      K 5.3*      CO2 18*   BUN 78*   CREATININE 5.9*   GLU 86   CALCIUM 9.5   MG 1.9   PHOS 6.8*       LFTs:  Recent Labs   Lab 03/01/19 1857   ALKPHOS 126   BILITOT 0.5   AST 39   ALT 10   ALBUMIN 3.7       Significant Imaging:     CXR AP  "No acute cardiopulmonary process appreciated." - per interpreting radiologist    Assessment/Plan:     * NSTEMI (non-ST elevated myocardial infarction)      - Known CAD (see below for " details regarding interventions)  - Admission troponin 14, will trend Q6H. Admission ECG with NSTWA  - Treating conservatively with heparin gtt and DAPT load (completed)  - Otherwise continue home CAD regimen with coreg, asa, and plavix tomorrow. Reduced coreg to 12.5 given hypotension, holding lisinopril due to MICHAEL  - Had originally ordered atorvastatin 80 mg but received a call from the pharmacy concerning the possibility of rhabdo given elevated CPK. See MICHAEL below for more details  - TTE in the morning  - Cardiology following     Acute renal failure superimposed on stage 3 chronic kidney disease      - Admission creatine 5.9, baseline unclear but appears in the 2-3.5 range  - Complicated by metabolic acidosis and uremia with confusion  - Etiology unclear. Urine electrolytes less helpful in the setting of MICHAEL. Straight cath ordered to rule-out obstruction  - Perhaps he was down longer than expected? He does have a disproportionate CPK elevation, will trend. He currently does not have atorvastatin ordered for tomorrow at pharmacy's recommendation. If repeat CPK is stable this will need to be resumed given his NSTEMI and advanced CAD  - Already given fluids in the ED  - Will follow with at least daily BMP and strict intake and output  - Nephrology consulted     Encephalopathy, metabolic      - CT head without acute changes, though it does show sequela of remote infarct and a remote lacunar infarction  - No evidence of infection. Blood cultures ordered in abundance of caution  - No other signs of autonomic instability to suggest alcohol withdrawal, and he seemed to worsen with benzodiazapine administration in the ED  - Serum ammonia levels ordered though suspicion for HE low  - Tox screen negative  - Suspect uremia from MICHAEL on CKD as a major contributor. Nephrology consulted regarding potential HD and contacted by the ED provider  - For now will avoid anticholinergic medications and monitor closely     CAD (coronary  artery disease)      - Complicated by NSTEMI as described above  - s/p 1V CABG (LIMA-LAD) s/p PCI to ostial LM (Mr 3.31vrr6mw) with flash ballooning and PCI to LCX/OM1 (2.09rte60gk, 2.09rdw63ep, 2.79muf44kr) 2013, pad S/P REIA stent ([id number])  - Continue asa, plavix, and carvedilol. Holding atorvastatin, lisinopril  - ECG with NSTWA  - Cardiology following as described above     Cigarette nicotine dependence without complication      - Will encourage cessation when his mentation improves     Atherosclerotic peripheral vascular disease with rest pain      - Continue asa, plavix     S/P CABG x 1      - See CAD above     HLD (hyperlipidemia)      - Holding atorvastatin for now     HTN (hypertension)      - Well controlled, actually on the low side  - Continue coreg  - Otherwise hold amlodipine, HCTZ, clonidine, ACE-i       VTE Risk Mitigation (From admission, onward)        Ordered     heparin 25,000 units in dextrose 5% (100 units/ml) IV bolus from bag - ADDITIONAL PRN BOLUS - 60 units/kg (max bolus 4000 units)  As needed (PRN)      03/01/19 2122     heparin 25,000 units in dextrose 5% (100 units/ml) IV bolus from bag - ADDITIONAL PRN BOLUS - 30 units/kg (max bolus 4000 units)  As needed (PRN)      03/01/19 2122     heparin 25,000 units in dextrose 5% 250 mL (100 units/mL) infusion LOW INTENSITY nomogram - [facility]  Continuous      03/01/19 2122     IP VTE HIGH RISK PATIENT  Once      03/01/19 2212     Reason for No Pharmacological VTE Prophylaxis  Once      03/01/19 2212             Raffi Merino MD  Department of Hospital Medicine   [facility]

## 2019-03-02 NOTE — ED PROVIDER NOTES
"Encounter Date: 3/1/2019       History     Chief Complaint   Patient presents with    Altered Mental Status     Pt presents to ED via EMS for confusion. Pt AAO to self and place. Confused on date which is out of his baseline per family. Denies fall/trauma. Denies blood thinner use/HTN. Pt normally ambulatory per family, but was unable to stand today.     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 History provided by patient, EMS staff, and friend. Patient's friend (Bill Garcia) reports receiving a phone call yesterday that the patient was lethargic and confused. Patient was found to be sleeping in a recliner and appeared lethargic. Patient would only "grunt" to physical stimuli. Patient's friend thought he may have taken pain killers or drank too much at the time and wanted him to sleep it off. Patient continued be lethargic today which led him to call 911. Patient is alert on my exam, but has difficulty following commands. Patient states he quit drinking several years ago, but then states he last consumed alcohol yesterday. He denies recreational drug use. No history of seizures.       The history is provided by the patient and a relative. The history is limited by the condition of the patient.      Review of patient's allergies indicates:   Allergen Reactions    Iodine and iodide containing products      Anaphylactic rxn      Past Medical History:   Diagnosis Date    Alcohol abuse     Angina of effort     Arthritis     CAD (coronary artery disease)     Carotid artery stenosis, symptomatic 12/30/2014    Coronary artery disease     Gout     Hernia     HTN (hypertension)     Hyperlipidemia     Myocardial infarct, old     Smoker active     Past Surgical History:   Procedure Laterality Date    ANGIOGRAM-EXTREMITY-LOWER Bilateral 3/31/2016    Performed by GINA MAY" Carolann BRIGGS MD at [facility] OR [address] [place] [address]    ANGIOGRAM-RENAL Bilateral 7/30/2014    Performed by Inocencio Gotti MD at [facility] CATH LAB    ANGIOPLASTY      ANGIOPLASTY WITH STENT PLACEMENT Bilateral 3/31/2016    [facility]    AORTOGRAM Bilateral 3/31/2016    Performed by GINA Vuong III, MD at [facility] OR [address] [place] [address]    ARTERIOGRAM-LEG AND ULTRASOUND Right 1/2/2019    Performed by GINA Vuong III, MD at [facility] OR [address] [place] [address]    CARDIAC CATHETERIZATION      CARDIAC CATHETERIZATION      CATHETERIZATION, HEART, LEFT N/A 8/30/2013    Performed by Inocencio Gotti MD at [facility] CATH LAB    CORONARY ANGIOPLASTY      CORONARY ARTERY BYPASS GRAFT      CORONARY ARTERY BYPASS GRAFT (CABG) N/A 8/26/2013    Performed by Pj Duff MD at [facility] OR [address] [place] [address]    ENDARTERECTOMY-CAROTID Right 1/5/2015    Performed by GINA Vuong III, MD at [facility] OR [address] [place] [address]    HEMORRHOID SURGERY      INSERTION, STENT, CORONARY ARTERY N/A 8/7/2013    Performed by Inocencio Gotti MD at [facility] CATH LAB    PTA (ANGIOPLASTY, PERCUTANEOUS, TRANSLUMINAL) N/A 1/2/2019    Performed by GINA Vuong III, MD at [facility] OR [address] [place] [address]    TONSILLECTOMY       Family History   Problem Relation Age of Onset    Hypertension Father     Heart attack Father     Heart disease Father     Heart failure Father     Hypertension Sister     Clotting disorder Sister     Stroke Sister     Hypertension Paternal Grandfather     No Known Problems Mother     No Known Problems Brother     No Known Problems Maternal Grandmother     No Known Problems Maternal Grandfather     No Known Problems Paternal Grandmother     No Known Problems Maternal Aunt     No Known Problems Maternal Uncle     No Known Problems Paternal Aunt     No Known Problems Paternal Uncle     Anemia Neg Hx     Arrhythmia Neg Hx     Asthma Neg Hx     Fainting Neg Hx     Hyperlipidemia Neg Hx     Atrial Septal Defect Neg Hx      Social History      Tobacco Use    Smoking status: Current Every Day Smoker     Packs/day: 1.00    Smokeless tobacco: Former User   Substance Use Topics    Alcohol use: Yes     Alcohol/week: 100.8 oz     Types: 168 Cans of beer per week     Comment: 3-4 cans of beer a day    Drug use: No     Review of Systems   Unable to perform ROS: Mental status change     Physical Exam     Initial Vitals [03/01/19 1659]   BP Pulse Resp Temp SpO2   117/73 94 17 98.5 °F (36.9 °C) 97 %      MAP       --         Physical Exam    Constitutional: He appears well-developed and well-nourished. He is not diaphoretic. No distress.   Disheveled appearance   HENT:   Head: Normocephalic and atraumatic.   Mouth/Throat: Oropharynx is clear and moist. No oropharyngeal exudate.   Eyes: EOM are normal. Pupils are equal, round, and reactive to light.   Neck: Normal range of motion. Neck supple.   Cardiovascular: Normal rate and regular rhythm.   Pulmonary/Chest: No respiratory distress. He has wheezes (faint upper lobes).   Abdominal: Soft. He exhibits no distension. There is no tenderness.   Musculoskeletal: Normal range of motion.   Neurological: He is alert. He has normal strength. No cranial nerve deficit.   Oriented to person and place. States year is 1999. Tremulous in upper extremities. Difficulty following commands. Able to move all extremities.   Skin: Skin is warm and dry. No erythema.       ED Course   Critical Care  Date/Time: 3/1/2019 9:29 PM  Performed by: Flex Rios PA-C  Authorized by: Ugo Harvey MD   Direct patient critical care time: 15 minutes  Additional history critical care time: 10 minutes  Ordering / reviewing critical care time: 5 minutes  Documentation critical care time: 5 minutes  Consulting other physicians critical care time: 5 minutes  Consult with family critical care time: 5 minutes  Total critical care time (exclusive of procedural time) : 45 minutes  Critical care was necessary to treat or prevent imminent  or life-threatening deterioration of the following conditions: cardiac failure, dehydration and metabolic crisis.  Critical care was time spent personally by me on the following activities: discussions with consultants, discussions with primary provider, interpretation of cardiac output measurements, evaluation of patient's response to treatment, examination of patient, obtaining history from patient or surrogate, ordering and performing treatments and interventions, ordering and review of laboratory studies, ordering and review of radiographic studies, re-evaluation of patient's condition and review of old charts.        Labs Reviewed   CBC W/ AUTO DIFFERENTIAL - Abnormal; Notable for the following components:       Result Value    RBC 4.54 (*)     Hemoglobin 12.4 (*)     Hematocrit 39.9 (*)     MCHC 31.1 (*)     RDW 15.0 (*)     Platelets 120 (*)     All other components within normal limits    Narrative:     LAVENDER CHRIS MENDES SHARED   COMPREHENSIVE METABOLIC PANEL - Abnormal; Notable for the following components:    Potassium 5.3 (*)     CO2 18 (*)     BUN, Bld 78 (*)     Creatinine 5.9 (*)     eGFR if  10.9 (*)     eGFR if non  9.4 (*)     All other components within normal limits    Narrative:     LAVENDER SHARED  GREEN SHARED   URINALYSIS, REFLEX TO URINE CULTURE - Abnormal; Notable for the following components:    Ketones, UA Trace (*)     All other components within normal limits    Narrative:     Preferred Collection Type->Urine, Clean Catch  orange top   TROPONIN I - Abnormal; Notable for the following components:    Troponin I 14.089 (*)     All other components within normal limits    Narrative:     LAVENDER SHARED  GREEN SHARED   B-TYPE NATRIURETIC PEPTIDE - Abnormal; Notable for the following components:     (*)     All other components within normal limits    Narrative:     LAVENDER [employer] SHARED   PHOSPHORUS - Abnormal; Notable for the following  components:    Phosphorus 6.8 (*)     All other components within normal limits    Narrative:     LAVENDER SHARED  GREEN SHARED   CK - Abnormal; Notable for the following components:    CPK 1194 (*)     All other components within normal limits   ISTAT PROCEDURE - Abnormal; Notable for the following components:    POC BUN 71 (*)     POC Creatinine 5.9 (*)     POC Potassium 5.3 (*)     POC TCO2 (MEASURED) 20 (*)     All other components within normal limits   PROTIME-INR    Narrative:     LAVENDER SHARED  GREEN SHARED   MAGNESIUM    Narrative:     LAVENDER SHARED  GREEN SHARED   LIPASE    Narrative:     LAVENDER SHARED  GREEN SHARED   LACTIC ACID, PLASMA    Narrative:     LAVENDER SHARED  GREEN SHARED   DRUG SCREEN PANEL, URINE EMERGENCY    Narrative:     Preferred Collection Type->Urine, Clean Catch  orange top   ALCOHOL,MEDICAL (ETHANOL)    Narrative:     LAVENDER SHARED  GREEN SHARED   AMMONIA    Narrative:     LAVENDER SHARED  GREEN SHARED   TSH    Narrative:     LAVENDER SHARED  GREEN SHARED   TROPONIN I   APTT   APTT    Narrative:     LAVENDER SHARED  GREEN SHARED add on per Dr Rios order #[id number] 03/01/2019  21:29   APTT and TROP order #[id number] 03/01/2019  21:55 Dr Fredy Gonzalez   POCT GLUCOSE, HAND-HELD DEVICE        ECG Results    None       Imaging Results          X-Ray Chest AP Portable (Final result)  Result time 03/01/19 19:59:46    Final result by Kev Tate MD (03/01/19 19:59:46)                 Impression:      1. No acute cardiopulmonary process appreciated.      Electronically signed by: Kev Tate  Date:    03/01/2019  Time:    19:59             Narrative:    EXAMINATION:  XR CHEST AP PORTABLE    CLINICAL HISTORY:  Disorientation, unspecified    TECHNIQUE:  Single frontal portable view of the chest was performed.    COMPARISON:  Chest radiograph 03/28/2017    FINDINGS:  Cardiomediastinal silhouette is within normal limits.    No focal consolidation, overt interstitial edema, sizable  pleural effusion or pneumothorax.    Multilevel degenerative changes of the imaged spine.                               CT Head Without Contrast (Final result)  Result time 03/01/19 20:03:55    Final result by Flex Lee MD (03/01/19 20:03:55)                 Impression:      No evidence of parenchymal hemorrhage or acute major vascular territory infarction.  Further evaluation/follow-up as warranted.    Sequela of remote infarct in the right parasagittal frontal lobe.    Mild senescent changes as above.    Superior left caudate/corona radiata remote lacunar type infarct.    Electronically signed by resident: Mony Giron  Date:    03/01/2019  Time:    19:45    Electronically signed by: Flex Lee MD  Date:    03/01/2019  Time:    20:03             Narrative:    EXAMINATION:  CT HEAD WITHOUT CONTRAST    CLINICAL HISTORY:  Confusion/delirium, altered LOC, unexplained;    TECHNIQUE:  Low dose axial CT images obtained throughout the head without intravenous contrast. Sagittal and coronal reconstructions were performed.    COMPARISON:  Brain MRI 12/12/2014 and head CT 07/17/2014.    FINDINGS:  Intracranial compartment:    There is mild generalized cerebral volume loss with compensatory enlargement of the ventricular system and sulci.  No hydrocephalus.  No extra-axial fluid collection.    Encephalomalacia and volume loss in the right parasagittal frontal lobe, likely sequela of remote infarct.  Grossly stable distribution of supratentorial white matter mild chronic small vessel ischemic change.  Superior left caudate/corona radiata small remote lacunar type infarct.  No definite new focal areas of abnormal parenchymal attenuation.  No evidence parenchymal hemorrhage or acute major vascular territory infarction.    Skull/extracranial contents (limited evaluation): No fracture. Mastoid air cells and paranasal sinuses are clear.                                 Medical Decision Making:   History:   Old Medical  Records: I decided to obtain old medical records.  Old Records Summarized: records from clinic visits and records from previous admission(s).  Clinical Tests:   Lab Tests: Ordered and Reviewed  Radiological Study: Ordered and Reviewed  Medical Tests: Ordered and Reviewed  Other:   I have discussed this case with another health care provider.       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 DDx includes but not limited to encephalopathy, sepsis, bacteremia, UTI, electrolyte disturbance, substance intoxication, medication side effect, CVA, pneumonia. Will get labs, BCx, CXR, CT head.     ECG shows NSR 95 bpm with TWI in lead 1. Similar to previous. No STEMI    Work-up shows WBC 5.3 without left shift, H/H 12.4/39.9, K elevated 5.3, CO2 decreased 18, BUN elevated 78 (22-52 BL), Crt elevated 5.9 (2.1-3.5 BL), CPK elevated 1194, Trop elevated 14 (BL 9-13 in 2014), Ammonia 21, Lipase 12, UA negative for UTI or hematuria. UDS +opiates, benzos, -cocaine, ETOH <10    CXR 1 view without acute intrathoracic processes  CT head shows no evidence of parenchymal hemorrhage or acute major vascular territory infarction. Sequela of remote infarct in the right parasagittal frontal lobe.    8:44 PM  Discussed elevated troponin with cardiology and they will come see the patient. Consult placed. Patient appears more alert on reassessment and denies having any chest pain. Patient remains tremulous on reassessment. Will give dose of Ativan to see if tremors improve.    Patient evaluated by Cardiology at bedside. Will treat as NSTEMI and medically manage with Heparin infusion and Plavix load. Will discuss MICHAEL and uremia with Nephrology. Nephrology consult placed. Will admit to [facility] for ongoing management. I have discussed the  care of this patient with my supervising physician.          Attending Attestation:     Physician Attestation Statement for NP/PA:   I discussed this assessment and plan of this patient with the NP/PA, but I did not personally examine the patient. The face to face encounter was performed by the NP/PA.                     Clinical Impression:       ICD-10-CM ICD-9-CM   1. NSTEMI (non-ST elevated myocardial infarction) I21.4 410.70   2. Confusion R41.0 298.9   3. MICHAEL (acute kidney injury) N17.9 584.9   4. Encephalopathy acute G93.40 348.30         Disposition:   Disposition: Admitted  Condition: Serious                        Flex Rios PA-C  03/01/19 [id number]       Ugo Harvey MD  03/04/19 2952

## 2019-03-02 NOTE — SUBJECTIVE & OBJECTIVE
Past Medical History:   Diagnosis Date    Alcohol abuse     Angina of effort     Arthritis     CAD (coronary artery disease)     Carotid artery stenosis, symptomatic 12/30/2014    Coronary artery disease     Gout     Hernia     HTN (hypertension)     Hyperlipidemia     Myocardial infarct, old     Smoker active       Past Surgical History:   Procedure Laterality Date    ANGIOGRAM-EXTREMITY-LOWER Bilateral 3/31/2016    Performed by GINA Vuong III, MD at [facility] OR [address] [place] [address]    ANGIOGRAM-RENAL Bilateral 7/30/2014    Performed by Inocencio Gotti MD at [facility] CATH LAB    ANGIOPLASTY      ANGIOPLASTY WITH STENT PLACEMENT Bilateral 3/31/2016    Performed by GINA Vuong III, MD at [facility] OR [address] [place] [address]    AORTOGRAM Bilateral 3/31/2016    Performed by GINA Vuong III, MD at [facility] OR [address] [place] [address]    ARTERIOGRAM-LEG AND ULTRASOUND Right 1/2/2019    Performed by GINA Vuong III, MD at [facility] OR [address] [place] [address]    CARDIAC CATHETERIZATION      CARDIAC CATHETERIZATION      CATHETERIZATION, HEART, LEFT N/A 8/30/2013    Performed by Inocencio Gotti MD at [facility] CATH LAB    CORONARY ANGIOPLASTY      CORONARY ARTERY BYPASS GRAFT      CORONARY ARTERY BYPASS GRAFT (CABG) N/A 8/26/2013    Performed by Pj Duff MD at [facility] OR [address] [place] [address]    ENDARTERECTOMY-CAROTID Right 1/5/2015    Performed by GINA Vuong III, MD at [facility] OR [address] [place] [address]    HEMORRHOID SURGERY      INSERTION, STENT, CORONARY ARTERY N/A 8/7/2013    Performed by Inocencio Gotti MD at [facility] CATH LAB    PTA (ANGIOPLASTY, PERCUTANEOUS, TRANSLUMINAL) N/A 1/2/2019    Performed by GINA Vuong III, MD at [facility] OR [address] [place] [address]    TONSILLECTOMY         Review of patient's allergies indicates:   Allergen Reactions    Iodine and iodide containing products      Anaphylactic rxn      Current Facility-Administered Medications   Medication Frequency    0.9%  NaCl infusion Continuous    aspirin EC tablet 81 mg Daily    [START ON 3/3/2019] atorvastatin tablet 80 mg Daily     atorvastatin tablet 80 mg Once    carvedilol tablet 12.5 mg BID WM    clopidogrel tablet 75 mg Daily    dextrose 50% injection 12.5 g PRN    dextrose 50% injection 25 g PRN    glucagon (human recombinant) injection 1 mg PRN    glucose chewable tablet 16 g PRN    glucose chewable tablet 24 g PRN    heparin 25,000 units in dextrose 5% (100 units/ml) IV bolus from bag - ADDITIONAL PRN BOLUS - 30 units/kg (max bolus 4000 units) PRN    heparin 25,000 units in dextrose 5% (100 units/ml) IV bolus from bag - ADDITIONAL PRN BOLUS - 60 units/kg (max bolus 4000 units) PRN    heparin 25,000 units in dextrose 5% 250 mL (100 units/mL) infusion LOW INTENSITY nomogram - [facility] Continuous    lorazepam injection 1 mg Q2H PRN    ondansetron disintegrating tablet 8 mg Q8H PRN    sodium bicarbonate tablet 1,300 mg BID    sodium chloride 0.9% flush 5 mL PRN     Family History     Problem Relation (Age of Onset)    Clotting disorder Sister    Heart attack Father    Heart disease Father    Heart failure Father    Hypertension Father, Sister, Paternal Grandfather    No Known Problems Mother, Brother, Maternal Grandmother, Maternal Grandfather, Paternal Grandmother, Maternal Aunt, Maternal Uncle, Paternal Aunt, Paternal Uncle    Stroke Sister        Tobacco Use    Smoking status: Current Every Day Smoker     Packs/day: 1.00    Smokeless tobacco: Former User   Substance and Sexual Activity    Alcohol use: Yes     Alcohol/week: 100.8 oz     Types: 168 Cans of beer per week     Comment: 3-4 cans of beer a day    Drug use: No    Sexual activity: Not on file     Review of Systems   Unable to perform ROS: Mental status change     Objective:     Vital Signs (Most Recent):  Temp: 98 °F (36.7 °C) (03/02/19 1142)  Pulse: 74 (03/02/19 1142)  Resp: 18 (03/02/19 1142)  BP: 109/64 (03/02/19 1142)  SpO2: 95 % (03/02/19 1142)  O2 Device (Oxygen Therapy): room air (03/02/19 1142) Vital Signs (24h Range):  Temp:  [97.4 °F (36.3 °C)-98.5 °F  (36.9 °C)] 98 °F (36.7 °C)  Pulse:  [74-98] 74  Resp:  [17-22] 18  SpO2:  [93 %-98 %] 95 %  BP: ()/(50-73) 109/64     Weight: 77.6 kg (171 lb) (03/02/19 0922)  Body mass index is 25.25 kg/m².  Body surface area is 1.94 meters squared.    I/O last 3 completed shifts:  In: 2000 [IV Piggyback:2000]  Out: -     Physical Exam   Constitutional: No distress.   Disheveled appearing    Eyes: Pupils are equal, round, and reactive to light.   Cardiovascular: Normal rate and regular rhythm.   No murmur heard.  Pulmonary/Chest: Effort normal and breath sounds normal. No respiratory distress. He has no wheezes.   Abdominal: Soft. Bowel sounds are normal. He exhibits no distension. There is no tenderness.   Musculoskeletal: He exhibits no edema or tenderness.   Neurological:   Somnolent but awakens easily to verbal stimuli  Oriented to self only   Skin: Skin is warm and dry. Capillary refill takes less than 2 seconds. No rash noted. He is not diaphoretic. No erythema.       Significant Labs:  All labs within the past 24 hours have been reviewed.

## 2019-03-02 NOTE — CARE UPDATE
Brief update:    Repeat troponin still climbing, most recently up to 34.4. He remains asymptomatic though with BP on the low end for him. Called and discussed the case again with the cardiology fellow on call and will continue with the current plan. Also discussed holding atorvastatin until we obtain repeat CPK, which he agreed with. The UA without myoglobinuria is reassuring from that perspective.    Addendum: Spoke again and after reconsidering risks vs. benefits will go ahead and order the statin, appreciate the continued assistance

## 2019-03-02 NOTE — ED NOTES
Pt placed on continuous cardiac and pulse ox monitoring with blood pressure to cycle every 30 minutes.  VS stable; NSR noted.  Bed locked in lowest position; side rails up and locked x 2; call light, bedside table, and personal belongings within reach.  Pt instructed to alert nurse for assistance before attempting to get out of bed; verbalizes understanding.  Pt denies needs or complaints at this time; will continue to monitor pt.

## 2019-03-02 NOTE — HPI
"Mr. Daniel is a 63-year-old man with HTN, HLD, tobacco abuse, EtOH abuse, CAD s/p 1V CABG (LIMA-LAD) s/p PCI to ostial LM (Mr [id number]) with flash ballooning and PCI to LCX/OM1 ([id number], 2.10hiu06oo, 2.20gmz87mq) 2013, pad S/P REIA stent ([id number]) who presented with confusion. The following history was obtained largely from chart review as he is currently incapable of more than "yes/no" responses after prolonged delays. He was in his usual state of health until yesterday, when he found asleep by his friend and was confused to date/time. His friend thought he was sleepy from a pain medication or too much alcohol, so he let him sleep it off. When Mr. Daniel was persistently confused in the morning and minimally communicative, the friend brought him to the ED. His mental status cleared somewhat to the extent that he very clearly denies any chest pain, shortness of breath, palpitations, syncope, and edema. He agrees that his PO intake has been decreased lately but cannot provide any additional information    On arrival here he is hemodynamically stable though with some low normal BP measurements. Lab workup significant for MICHAEL with creatinine 5.9, BUN 78, hyperkalemia with K 5.3, NAGMA with bicarb 18, and hyperphosphatemia at 6.8. His troponin was elevated at 14, and his CPK at 1194. ECG unchanged from prior with NSTWA. Cardiology was consulted, who recommended medical therapy for NSTEMI given his lack of symptoms and worsening renal function   "

## 2019-03-02 NOTE — ASSESSMENT & PLAN NOTE
- Well controlled, actually on the low side  - Continue coreg  - Otherwise hold amlodipine, HCTZ, clonidine, ACE-i

## 2019-03-02 NOTE — SUBJECTIVE & OBJECTIVE
Past Medical History:   Diagnosis Date    Alcohol abuse     Angina of effort     Arthritis     CAD (coronary artery disease)     Carotid artery stenosis, symptomatic 12/30/2014    Coronary artery disease     Gout     Hernia     HTN (hypertension)     Hyperlipidemia     Myocardial infarct, old     Smoker active       Past Surgical History:   Procedure Laterality Date    ANGIOGRAM-EXTREMITY-LOWER Bilateral 3/31/2016    Performed by GINA Vuong III, MD at [facility] OR [address] [place] [address]    ANGIOGRAM-RENAL Bilateral 7/30/2014    Performed by Inocencio Gotti MD at [facility] CATH LAB    ANGIOPLASTY      ANGIOPLASTY WITH STENT PLACEMENT Bilateral 3/31/2016    Performed by GINA Vuong III, MD at [facility] OR [address] [place] [address]    AORTOGRAM Bilateral 3/31/2016    Performed by GINA Vuong III, MD at [facility] OR [address] [place] [address]    ARTERIOGRAM-LEG AND ULTRASOUND Right 1/2/2019    Performed by GINA Vuong III, MD at [facility] OR [address] [place] [address]    CARDIAC CATHETERIZATION      CARDIAC CATHETERIZATION      CATHETERIZATION, HEART, LEFT N/A 8/30/2013    Performed by Inocencio Gotti MD at [facility] CATH LAB    CORONARY ANGIOPLASTY      CORONARY ARTERY BYPASS GRAFT      CORONARY ARTERY BYPASS GRAFT (CABG) N/A 8/26/2013    Performed by Pj Duff MD at [facility] OR [address] [place] [address]    ENDARTERECTOMY-CAROTID Right 1/5/2015    Performed by GINA Vuong III, MD at [facility] OR [address] [place] [address]    HEMORRHOID SURGERY      INSERTION, STENT, CORONARY ARTERY N/A 8/7/2013    Performed by Inocencio Gotti MD at [facility] CATH LAB    PTA (ANGIOPLASTY, PERCUTANEOUS, TRANSLUMINAL) N/A 1/2/2019    Performed by GINA Vuong III, MD at [facility] OR [address] [place] [address]    TONSILLECTOMY         Review of patient's allergies indicates:   Allergen Reactions    Iodine and iodide containing products      Anaphylactic rxn        No current facility-administered medications on file prior to encounter.      Current Outpatient Medications on File Prior to Encounter   Medication Sig    allopurinol (ZYLOPRIM) 100 MG  tablet Take 100 mg by mouth once daily.     amLODIPine (NORVASC) 10 MG tablet amlodipine 10 mg tablet q HS    aspirin (ECOTRIN) 81 MG EC tablet Take 81 mg by mouth once daily.    atorvastatin (LIPITOR) 40 MG tablet atorvastatin 40 mg tablet    carvedilol (COREG) 25 MG tablet carvedilol 25 mg tablet TWO TIMES A DAY    cloNIDine (CATAPRES) 0.2 MG tablet clonidine HCl 0.2 mg tablet TWO TIMES A DAY    clopidogrel (PLAVIX) 75 mg tablet clopidogrel 75 mg tablet    hydroCHLOROthiazide (HYDRODIURIL) 25 MG tablet hydrochlorothiazide 25 mg tablet    lisinopril (PRINIVIL,ZESTRIL) 20 MG tablet lisinopril 20 mg tablet    LORazepam (ATIVAN) 0.5 MG tablet Ativan 0.5 mg tablet   Take 1 tablet by oral route at bedtime.    metoprolol tartrate (LOPRESSOR) 25 MG tablet metoprolol tartrate 25 mg tablet two times a day    nitroGLYCERIN (NITROSTAT) 0.4 MG SL tablet nitroglycerin 0.4 mg sublingual tablet     Family History     Problem Relation (Age of Onset)    Clotting disorder Sister    Heart attack Father    Heart disease Father    Heart failure Father    Hypertension Father, Sister, Paternal Grandfather    No Known Problems Mother, Brother, Maternal Grandmother, Maternal Grandfather, Paternal Grandmother, Maternal Aunt, Maternal Uncle, Paternal Aunt, Paternal Uncle    Stroke Sister        Tobacco Use    Smoking status: Current Every Day Smoker     Packs/day: 1.00    Smokeless tobacco: Former User   Substance and Sexual Activity    Alcohol use: Yes     Alcohol/week: 100.8 oz     Types: 168 Cans of beer per week     Comment: 3-4 cans of beer a day    Drug use: No    Sexual activity: Not on file     Review of Systems   Unable to perform ROS: Mental status change     Objective:     Vital Signs (Most Recent):  Temp: 98.5 °F (36.9 °C) (03/01/19 1659)  Pulse: 88 (03/01/19 2243)  Resp: (!) 22 (03/01/19 2243)  BP: 96/65 (03/01/19 2243)  SpO2: 95 % (03/01/19 2245) Vital Signs (24h Range):  Temp:  [98.5 °F (36.9 °C)] 98.5 °F (36.9  "°C)  Pulse:  [82-98] 88  Resp:  [17-22] 22  SpO2:  [93 %-98 %] 95 %  BP: ()/(50-73) 96/65     Weight: 78 kg (171 lb 15.3 oz)  Body mass index is 25.39 kg/m².    Physical Exam   Constitutional: No distress.   Disheveled appearing    Eyes: Pupils are equal, round, and reactive to light.   Cardiovascular: Normal rate and regular rhythm.   No murmur heard.  Pulmonary/Chest: Effort normal and breath sounds normal. No respiratory distress. He has no wheezes.   Abdominal: Soft. Bowel sounds are normal. He exhibits no distension. There is no tenderness.   Musculoskeletal: He exhibits no edema or tenderness.   Neurological:   Somnolent but awakens easily to verbal stimuli  Oriented to self only  Follows one-step commands, albeit slowly   Skin: Skin is warm and dry. Capillary refill takes less than 2 seconds. No rash noted. He is not diaphoretic. No erythema.         CRANIAL NERVES     CN III, IV, VI   Pupils are equal, round, and reactive to light.       Significant Labs:     CBC:  Recent Labs   Lab 03/01/19 1857 03/01/19 1911   WBC 5.34  --    GRAN 65.7  3.5  --    HGB 12.4*  --    HCT 39.9* 39   *  --        Chem 10:  Recent Labs   Lab 03/01/19 1857      K 5.3*      CO2 18*   BUN 78*   CREATININE 5.9*   GLU 86   CALCIUM 9.5   MG 1.9   PHOS 6.8*       LFTs:  Recent Labs   Lab 03/01/19 1857   ALKPHOS 126   BILITOT 0.5   AST 39   ALT 10   ALBUMIN 3.7       Significant Imaging:     CXR AP  "No acute cardiopulmonary process appreciated." - per interpreting radiologist  "

## 2019-03-02 NOTE — SUBJECTIVE & OBJECTIVE
"Interval History:   Mr Daniel is a 64 yo man with hx of CAD s/p 1V CABG (LIMA-LAD) s/p PCI to ostial LM (Mr 3.44pua7bg) with flash ballooning and PCI to LCX/OM1 (2.41dql05kg, 2.21tjz35gc, 2.74rfw03lm) 2013, pad S/P REIA stent ([id number]), HTN, HLD, active tobacco use, EtOH use (3-4 beers per day who presents to the ED with AMS. He was found sleeping in a recliner and appeared lethargic. Patient would only "grunt" to physical stimuli. Patient's friend thought he may have taken pain killers or drank too much at the time and wanted him to sleep it off. Patient continued be lethargic today which led him to call 911.   Upon ED presentation, he was found to be uremic with Cr 5.7 and BUN 72. EKG unchanged from prior with non specific T wave abnormalities and troponin of 14.      Review of Systems   Constitution: Positive for weakness.   HENT: Negative.    Eyes: Negative.    Cardiovascular: Negative for chest pain, claudication, cyanosis, dyspnea on exertion, irregular heartbeat, near-syncope, orthopnea, palpitations, paroxysmal nocturnal dyspnea and syncope.   Respiratory: Negative for cough, hemoptysis, shortness of breath, sleep disturbances due to breathing, snoring, sputum production and wheezing.    Endocrine: Negative.    Skin: Negative for color change and nail changes.   Gastrointestinal: Negative.    Genitourinary: Negative.    Neurological: Positive for light-headedness. Negative for aphonia, brief paralysis, difficulty with concentration, disturbances in coordination, excessive daytime sleepiness, dizziness, focal weakness and headaches.     Objective:     Vital Signs (Most Recent):  Temp: 98.5 °F (36.9 °C) (03/01/19 1659)  Pulse: 82 (03/01/19 2010)  Resp: (!) 22 (03/01/19 2010)  BP: (!) 100/54 (03/01/19 2013)  SpO2: (!) 93 % (03/01/19 2010) Vital Signs (24h Range):  Temp:  [98.5 °F (36.9 °C)] 98.5 °F (36.9 °C)  Pulse:  [82-94] 82  Resp:  [17-22] 22  SpO2:  [93 %-97 %] 93 %  BP: (100-117)/(54-73) 100/54      "   There is no height or weight on file to calculate BMI.     SpO2: (!) 93 %  O2 Device (Oxygen Therapy): nasal cannula    No intake or output data in the 24 hours ending 03/01/19 2046    Lines/Drains/Airways     Peripheral Intravenous Line                 Peripheral IV - Single Lumen 03/01/19 1904 Left Antecubital less than 1 day                Physical Exam   Constitutional: He is oriented to person, place, and time. He appears well-developed and well-nourished.   HENT:   Head: Normocephalic and atraumatic.   Eyes: Conjunctivae are normal.   Neck: Neck supple. No JVD present. No thyromegaly present.   Cardiovascular: Normal rate, regular rhythm and normal heart sounds. Exam reveals no gallop and no friction rub.   No murmur heard.  Pulmonary/Chest: Effort normal and breath sounds normal. No respiratory distress. He has no wheezes. He has no rales.   Abdominal: Soft. Bowel sounds are normal. He exhibits no distension. There is no tenderness. There is no rebound.   Neurological: He is oriented to person, place, and time.   Skin: Skin is warm and dry.   Nursing note and vitals reviewed.      Significant Labs:   BMP:   Recent Labs   Lab 03/01/19  1857   GLU 86      K 5.3*      CO2 18*   BUN 78*   CREATININE 5.9*   CALCIUM 9.5   MG 1.9   , CMP   Recent Labs   Lab 03/01/19  1857      K 5.3*      CO2 18*   GLU 86   BUN 78*   CREATININE 5.9*   CALCIUM 9.5   PROT 7.3   ALBUMIN 3.7   BILITOT 0.5   ALKPHOS 126   AST 39   ALT 10   ANIONGAP 15   ESTGFRAFRICA 10.9*   EGFRNONAA 9.4*   , Lipid Panel No results for input(s): CHOL, HDL, LDLCALC, TRIG, CHOLHDL in the last 48 hours., Troponin   Recent Labs   Lab 03/01/19  1857   TROPONINI 14.089*   , All pertinent lab results from the last 24 hours have been reviewed. and   Recent Lab Results       03/01/19  1935 03/01/19  1911 03/01/19  1903   03/01/19  1857        Benzodiazepines Presumptive Positive           Methadone metabolites Negative            Phencyclidine Negative           Immature Granulocytes       0.4     Immature Grans (Abs)       0.02  Comment:  Mild elevation in immature granulocytes is non specific and   can be seen in a variety of conditions including stress response,   acute inflammation, trauma and pregnancy. Correlation with other   laboratory and clinical findings is essential.       Albumin       3.7     Alcohol, Medical, Serum       <10     Alkaline Phosphatase       126     ALT       10     Ammonia       21     Amphetamine Screen, Ur Negative           Anion Gap       15     Appearance, UA Clear           AST       39     Barbiturate Screen, Ur Negative           Baso #       0.01     Basophil%       0.2     Bilirubin (UA) Negative           Total Bilirubin       0.5  Comment:  For infants and newborns, interpretation of results should be based  on gestational age, weight and in agreement with clinical  observations.  Premature Infant recommended reference ranges:  Up to 24 hours.............<8.0 mg/dL  Up to 48 hours............<12.0 mg/dL  3-5 days..................<15.0 mg/dL  6-29 days.................<15.0 mg/dL       BNP       775  Comment:  Values of less than 100 pg/ml are consistent with non-CHF populations.     BUN, Bld       78     Calcium       9.5     Chloride       103     CO2       18     Cocaine (Metab.) Negative           Color, UA Yellow           CPK     1194       Creatinine       5.9     Creatinine, Random Ur 194.0  Comment:  The random urine reference ranges provided were established   for 24 hour urine collections.  No reference ranges exist for  random urine specimens.  Correlate clinically.             Differential Method       Automated     eGFR if        10.9     eGFR if non        9.4  Comment:  Calculation used to obtain the estimated glomerular filtration  rate (eGFR) is the CKD-EPI equation.        Eos #       0.2     Eosinophil%       2.8     Glucose       86     Glucose, UA  Negative           Gran # (ANC)       3.5     Gran%       65.7     Hematocrit       39.9     Hemoglobin       12.4     Coumadin Monitoring INR       1.0  Comment:  Coumadin Therapy:  2.0 - 3.0 for INR for all indicators except mechanical heart valves  and antiphospholipid syndromes which should use 2.5 - 3.5.       Ketones, UA Trace           Lactate, Abril       0.7  Comment:  Falsely low lactic acid results can be found in samples   containing >=13.0 mg/dL total bilirubin and/or >=3.5 mg/dL   direct bilirubin.       Leukocytes, UA Negative           Lipase       12     Lymph #       1.3     Lymph%       24.3     Magnesium       1.9     MCH       27.3     MCHC       31.1     MCV       88     Mono #       0.4     Mono%       6.6     MPV       11.5     Nitrite, UA Negative           nRBC       0     Occult Blood UA Negative           Opiate Scrn, Ur Presumptive Positive           pH, UA 5.0           Phosphorus       6.8     Platelets       120     POC BUN   71         POC Chloride   105         POC Creatinine   5.9         POC Glucose   86         POC Hematocrit   39         POC Ionized Calcium   1.20         POC Potassium   5.3         POC Sodium   139         POC TCO2 (MEASURED)   20         Potassium       5.3  Comment:  *No Visible Hemolysis     Total Protein       7.3     Protein, UA Negative  Comment:  Recommend a 24 hour urine protein or a urine   protein/creatinine ratio if globulin induced proteinuria is  clinically suspected.             Protime       10.0     RBC       4.54     RDW       15.0     Sample   ABRIL         Sodium       136     Specific [place], UA 1.010           Specimen UA Urine, Clean Catch           Marijuana (THC) Metabolite Negative           Toxicology Information SEE COMMENT  Comment:  This screen includes the following classes of drugs at the   listed cut-off:  Benzodiazepines                  200 ng/ml  Methadone                        300 ng/ml  Cocaine metabolite               300  ng/ml  Opiates                          300 ng/ml  Barbiturates                     200 ng/ml  Amphetamines                    1000 ng/ml  Marijuana metabs (THC)            50 ng/ml  Phencyclidine (PCP)               25 ng/ml  High concentrations of Diphenhydramine may cross-react with  Phencyclidine PCP screening immunoassay giving a false   positive result.  High concentrations of Methylenedioxymethamphetamine (MDMA aka  Ectasy) and other structurally similar compounds may cross-   react with the Amphetamine/Methamphetamine screening   immunoassay giving a false positive result.  A metabolite of the anti-HIV drug Sustiva () may cause  false positive results in the Marijuana metabolite (THC)   screening assay.  Note: This exception list includes only more common   interferants in toxicology screen testing.  Because of many   cross-reactantspositive results on toxicology drug screens   should be confirmed whenever results do not correlate with   clinical presentation.  This report is intended for use in clinical monitoring and  management of patients. It is not intended for use in   employment related drug testing.  Because of any cross-reactants, positive results on toxicology  drug screens should be confirmed whenever results do not  correlate with clinical presentation.  Presumptive positive results are unconfirmed and may be used   only for medical purposes.             Troponin I       14.089  Comment:  The reference interval for Troponin I represents the 99th percentile   cutoff   for our facility and is consistent with 3rd generation assay   performance.       TSH       1.165     WBC       5.34           Significant Imaging: Echocardiogram:   2D echo with color flow doppler:   Results for orders placed or performed during the hospital encounter of 10/04/14   2D echo with color flow doppler   Result Value Ref Range    QEF 65 55 - 65    Mitral Valve Regurgitation MILD     Diastolic Dysfunction Yes (A)      Aortic Valve Regurgitation MILD     and Transthoracic echo (TTE) complete (Cupid Only): No results found for this or any previous visit. and X-Ray: CXR: X-Ray Chest 1 View (CXR): No results found for this visit on 03/01/19. and X-Ray Chest PA and Lateral (CXR): No results found for this visit on 03/01/19.

## 2019-03-02 NOTE — ASSESSMENT & PLAN NOTE
- Admission creatine 5.9, baseline unclear but appears in the 2-3.5 range  - Complicated by metabolic acidosis and uremia with confusion  - Etiology unclear. Urine electrolytes less helpful in the setting of MICAHEL. Straight cath ordered to rule-out obstruction  - Perhaps he was down longer than expected? He does have a disproportionate CPK elevation, will trend. He currently does not have atorvastatin ordered for tomorrow at pharmacy's recommendation. If repeat CPK is stable this will need to be resumed given his NSTEMI and advanced CAD  - Already given fluids in the ED  - Will follow with at least daily BMP and strict intake and output  - Nephrology consulted

## 2019-03-02 NOTE — ED NOTES
LOC: The patient is awake, alert; oriented to self and place only; otherwise confused. Affect is appropriate.  Speech is appropriate and clear.     APPEARANCE: Patient resting comfortably in no acute distress but is experiencing tremors.  Patient is poorly groomed.    SKIN: The skin is warm and dry; color consistent with ethnicity.  Patient has normal skin turgor and moist mucus membranes.  Skin intact; no breakdown or bruising noted.     MUSCULOSKELETAL: Patient moving upper and lower extremities without difficulty.  Denies weakness.     RESPIRATORY: Airway is open and patent. Respirations spontaneous, even, easy, and non-labored.  Patient has a normal effort and rate.  No accessory muscle use noted. Denies cough.     CARDIAC:  Normal rhythm and rate noted.  No peripheral edema noted. No complaints of chest pain.      ABDOMEN: Soft and non tender to palpation.  No distention noted. Umbilical hernia noted.    NEUROLOGIC: Eyes open spontaneously.  Behavior appropriate to situation though pt is confused.  Follows commands; facial expression symmetrical.  Purposeful motor response noted; normal sensation in all extremities.

## 2019-03-03 PROBLEM — E87.20 METABOLIC ACIDOSIS: Status: ACTIVE | Noted: 2019-03-03

## 2019-03-03 PROBLEM — M62.82 NON-TRAUMATIC RHABDOMYOLYSIS: Status: ACTIVE | Noted: 2019-03-03

## 2019-03-03 LAB
ALBUMIN SERPL BCP-MCNC: 2.9 G/DL
ALP SERPL-CCNC: 92 U/L
ALT SERPL W/O P-5'-P-CCNC: 10 U/L
ANION GAP SERPL CALC-SCNC: 12 MMOL/L
APTT BLDCRRT: 54.2 SEC
APTT BLDCRRT: 54.2 SEC
AST SERPL-CCNC: 32 U/L
BASOPHILS # BLD AUTO: 0.01 K/UL
BASOPHILS NFR BLD: 0.3 %
BILIRUB SERPL-MCNC: 0.5 MG/DL
BUN SERPL-MCNC: 57 MG/DL
CALCIUM SERPL-MCNC: 9.2 MG/DL
CHLORIDE SERPL-SCNC: 113 MMOL/L
CK SERPL-CCNC: 515 U/L
CO2 SERPL-SCNC: 18 MMOL/L
CREAT SERPL-MCNC: 3.2 MG/DL
DIFFERENTIAL METHOD: ABNORMAL
EOSINOPHIL # BLD AUTO: 0.3 K/UL
EOSINOPHIL NFR BLD: 6.8 %
ERYTHROCYTE [DISTWIDTH] IN BLOOD BY AUTOMATED COUNT: 15.3 %
EST. GFR  (AFRICAN AMERICAN): 22.8 ML/MIN/1.73 M^2
EST. GFR  (NON AFRICAN AMERICAN): 19.7 ML/MIN/1.73 M^2
GLUCOSE SERPL-MCNC: 95 MG/DL
HCT VFR BLD AUTO: 34.4 %
HGB BLD-MCNC: 11.1 G/DL
IMM GRANULOCYTES # BLD AUTO: 0.01 K/UL
IMM GRANULOCYTES NFR BLD AUTO: 0.3 %
LYMPHOCYTES # BLD AUTO: 1.5 K/UL
LYMPHOCYTES NFR BLD: 41.6 %
MAGNESIUM SERPL-MCNC: 1.6 MG/DL
MCH RBC QN AUTO: 27.6 PG
MCHC RBC AUTO-ENTMCNC: 32.3 G/DL
MCV RBC AUTO: 86 FL
MONOCYTES # BLD AUTO: 0.4 K/UL
MONOCYTES NFR BLD: 9.7 %
NEUTROPHILS # BLD AUTO: 1.5 K/UL
NEUTROPHILS NFR BLD: 41.3 %
NRBC BLD-RTO: 0 /100 WBC
PHOSPHATE SERPL-MCNC: 3.4 MG/DL
PLATELET # BLD AUTO: 107 K/UL
PMV BLD AUTO: 11.3 FL
POTASSIUM SERPL-SCNC: 4.3 MMOL/L
PROT SERPL-MCNC: 5.9 G/DL
PTH-INTACT SERPL-MCNC: 203 PG/ML
RBC # BLD AUTO: 4.02 M/UL
SODIUM SERPL-SCNC: 143 MMOL/L
TROPONIN I SERPL DL<=0.01 NG/ML-MCNC: 21.87 NG/ML
TROPONIN I SERPL DL<=0.01 NG/ML-MCNC: 24.79 NG/ML
WBC # BLD AUTO: 3.7 K/UL

## 2019-03-03 PROCEDURE — 83970 ASSAY OF PARATHORMONE: CPT

## 2019-03-03 PROCEDURE — 85730 THROMBOPLASTIN TIME PARTIAL: CPT | Mod: 91

## 2019-03-03 PROCEDURE — 25000003 PHARM REV CODE 250: Performed by: INTERNAL MEDICINE

## 2019-03-03 PROCEDURE — 99233 PR SUBSEQUENT HOSPITAL CARE,LEVL III: ICD-10-PCS | Mod: ,,, | Performed by: HOSPITALIST

## 2019-03-03 PROCEDURE — 84100 ASSAY OF PHOSPHORUS: CPT

## 2019-03-03 PROCEDURE — 63600175 PHARM REV CODE 636 W HCPCS: Performed by: PHYSICIAN ASSISTANT

## 2019-03-03 PROCEDURE — 84484 ASSAY OF TROPONIN QUANT: CPT | Mod: 91

## 2019-03-03 PROCEDURE — 84484 ASSAY OF TROPONIN QUANT: CPT

## 2019-03-03 PROCEDURE — 36415 COLL VENOUS BLD VENIPUNCTURE: CPT

## 2019-03-03 PROCEDURE — 99233 SBSQ HOSP IP/OBS HIGH 50: CPT | Mod: ,,, | Performed by: HOSPITALIST

## 2019-03-03 PROCEDURE — 25000003 PHARM REV CODE 250: Performed by: HOSPITALIST

## 2019-03-03 PROCEDURE — 82550 ASSAY OF CK (CPK): CPT

## 2019-03-03 PROCEDURE — 93010 EKG 12-LEAD: ICD-10-PCS | Mod: ,,, | Performed by: INTERNAL MEDICINE

## 2019-03-03 PROCEDURE — 80074 ACUTE HEPATITIS PANEL: CPT

## 2019-03-03 PROCEDURE — 93010 ELECTROCARDIOGRAM REPORT: CPT | Mod: ,,, | Performed by: INTERNAL MEDICINE

## 2019-03-03 PROCEDURE — 93005 ELECTROCARDIOGRAM TRACING: CPT

## 2019-03-03 PROCEDURE — 11000001 HC ACUTE MED/SURG PRIVATE ROOM

## 2019-03-03 PROCEDURE — 85025 COMPLETE CBC W/AUTO DIFF WBC: CPT

## 2019-03-03 PROCEDURE — 85730 THROMBOPLASTIN TIME PARTIAL: CPT

## 2019-03-03 PROCEDURE — 83735 ASSAY OF MAGNESIUM: CPT

## 2019-03-03 PROCEDURE — 80053 COMPREHEN METABOLIC PANEL: CPT

## 2019-03-03 RX ADMIN — ASPIRIN 81 MG: 81 TABLET, COATED ORAL at 08:03

## 2019-03-03 RX ADMIN — ATORVASTATIN CALCIUM 80 MG: 20 TABLET, FILM COATED ORAL at 08:03

## 2019-03-03 RX ADMIN — SODIUM BICARBONATE 650 MG TABLET 1300 MG: at 08:03

## 2019-03-03 RX ADMIN — HEPARIN SODIUM AND DEXTROSE 14 UNITS/KG/HR: 10000; 5 INJECTION INTRAVENOUS at 02:03

## 2019-03-03 RX ADMIN — CLOPIDOGREL 75 MG: 75 TABLET, FILM COATED ORAL at 08:03

## 2019-03-03 RX ADMIN — CARVEDILOL 12.5 MG: 12.5 TABLET, FILM COATED ORAL at 08:03

## 2019-03-03 NOTE — ASSESSMENT & PLAN NOTE
- Complicated by NSTEMI as described above  - s/p 1V CABG (LIMA-LAD) s/p PCI to ostial LM (Mr 3.62knu8ks) with flash ballooning and PCI to LCX/OM1 (2.65bqn06cz, 2.48psf99dz, 2.68eef45cq) 2013, pad S/P REIA stent (6q80m08)  - Continue asa, plavix, reduced-dose carvedilol, atorvastatin. Holding lisinopril  - ECG with NSTWA  - Cardiology following as described above

## 2019-03-03 NOTE — ASSESSMENT & PLAN NOTE
- Known CAD (see below for details regarding interventions)  - Admission troponin 14, peaked at 39.8. Admission ECG with NSTWA  - Treating conservatively with heparin gtt and DAPT load (completed)  - Otherwise continue home CAD regimen with coreg, asa, plavix, and atorvastatin. Reduced coreg to 12.5 given hypotension, holding lisinopril due to MICHAEL.  - TTE with concentric LVH, EF 60%, normal diastolic function, no WMA  - Cardiology following

## 2019-03-03 NOTE — PROGRESS NOTES
"[facility] Hospital Medicine  Progress Note    Patient Name: Emanuel Daniel  MRN: [id number]  Patient Class: IP- Inpatient   Admission Date: 3/1/2019  Length of Stay: 2 days  Attending Physician: Ranjit Bob, *  Primary Care Provider: Tom Diamond MD    [facility] Medicine Team: [facility] Ranjit Bob MD    Subjective:     Principal Problem:NSTEMI (non-ST elevated myocardial infarction)    HPI:  Mr. Daniel is a 63-year-old man with HTN, HLD, tobacco abuse, EtOH abuse, CAD s/p 1V CABG (LIMA-LAD) s/p PCI to ostial LM (Mr 3.19cib2tq) with flash ballooning and PCI to LCX/OM1 (2.67nuk37xr, 2.16awy93xg, 2.25zwh08ua) 2013, pad S/P REIA stent ([id number]) who presented with confusion. The following history was obtained largely from chart review as he is currently incapable of more than "yes/no" responses after prolonged delays. He was in his usual state of health until yesterday, when he found asleep by his friend and was confused to date/time. His friend thought he was sleepy from a pain medication or too much alcohol, so he let him sleep it off. When Mr. Daniel was persistently confused in the morning and minimally communicative, the friend brought him to the ED. His mental status cleared somewhat to the extent that he very clearly denies any chest pain, shortness of breath, palpitations, syncope, and edema. He agrees that his PO intake has been decreased lately but cannot provide any additional information    On arrival here he is hemodynamically stable though with some low normal BP measurements. Lab workup significant for MICHAEL with creatinine 5.9, BUN 78, hyperkalemia with K 5.3, NAGMA with bicarb 18, and hyperphosphatemia at 6.8. His troponin was elevated at 14, and his CPK at 1194. ECG unchanged from prior with NSTWA. Cardiology was consulted, who recommended medical therapy for NSTEMI given his lack of symptoms and worsening renal function     Hospital " [place]:  [place] [facility] [place] History: Improved mentation from admission though still not totally normal. Patient hopes to leave the hospital ASAP but is ok with stay for further monitoring of renal/cardiac function. Denies any chest pain, shortness of breath, anxiety.    Review of Systems   Constitutional: Negative for chills, fatigue and fever.   Respiratory: Negative for shortness of breath.    Cardiovascular: Negative for chest pain, palpitations and leg swelling.   Genitourinary: Negative for difficulty urinating.   Neurological: Positive for tremors. Negative for dizziness and light-headedness.   Psychiatric/Behavioral: Positive for confusion. Negative for agitation.     Objective:     Vital Signs (Most Recent):  Temp: 98.6 °F (37 °C) (03/03/19 0804)  Pulse: 84 (03/03/19 0804)  Resp: 18 (03/03/19 0804)  BP: (!) 164/73 (03/03/19 0804)  SpO2: 97 % (03/03/19 0804) Vital Signs (24h Range):  Temp:  [98 °F (36.7 °C)-98.6 °F (37 °C)] 98.6 °F (37 °C)  Pulse:  [59-90] 84  Resp:  [16-18] 18  SpO2:  [95 %-99 %] 97 %  BP: (109-164)/(57-73) 164/73     Weight: 77 kg (169 lb 12.1 oz)  Body mass index is 25.07 kg/m².    Intake/Output Summary (Last 24 hours) at 3/3/2019 0939  Last data filed at 3/3/2019 0100  Gross per 24 hour   Intake 240 ml   Output --   Net 240 ml      Physical Exam   Constitutional: He is oriented to person, place, and time. No distress.   Disheveled appearing    Eyes: Pupils are equal, round, and reactive to light.   Cardiovascular: Normal rate and regular rhythm.   No murmur heard.  Pulmonary/Chest: Effort normal and breath sounds normal. No respiratory distress. He has no wheezes.   Abdominal: Soft. Bowel sounds are normal. He exhibits no distension. There is no tenderness.   Musculoskeletal: He exhibits no edema or tenderness.   Neurological: He is alert and oriented to person, place, and time.   + asterixis   Skin: Skin is warm and dry. Capillary refill takes less than 2 seconds. No rash noted.  He is not diaphoretic. No erythema.       Significant Labs:   Recent Lab Results       03/03/19  0702   03/03/19  0351   03/03/19  0050   03/02/19  1925   03/02/19  1602        Immature Granulocytes   0.3           Immature Grans (Abs)   0.01  Comment:  Mild elevation in immature granulocytes is non specific and   can be seen in a variety of conditions including stress response,   acute inflammation, trauma and pregnancy. Correlation with other   laboratory and clinical findings is essential.             Albumin   2.9     3.1     Alkaline Phosphatase   92           ALT   10           Anion Gap   12     13     aPTT 54.2  Comment:  aPTT therapeutic range = 39-69 seconds 54.2  Comment:  aPTT therapeutic range = 39-69 seconds   51.3  Comment:  aPTT therapeutic range = 39-69 seconds       AST   32           Baso #   0.01           Basophil%   0.3           Total Bilirubin   0.5  Comment:  For infants and newborns, interpretation of results should be based  on gestational age, weight and in agreement with clinical  observations.  Premature Infant recommended reference ranges:  Up to 24 hours.............<8.0 mg/dL  Up to 48 hours............<12.0 mg/dL  3-5 days..................<15.0 mg/dL  6-29 days.................<15.0 mg/dL             BUN, Bld   57     67     Calcium   9.2     9.3     Chloride   113     112     CO2   18     17     CPK   515           Creatinine   3.2     4.2     Differential Method   Automated           eGFR if    22.8     16.4     eGFR if non    19.7  Comment:  Calculation used to obtain the estimated glomerular filtration  rate (eGFR) is the CKD-EPI equation.        14.2  Comment:  Calculation used to obtain the estimated glomerular filtration  rate (eGFR) is the CKD-EPI equation.        Eos #   0.3           Eosinophil%   6.8           Glucose   95     97     Gran # (ANC)   1.5           Gran%   41.3           Hematocrit   34.4           Hemoglobin   11.1            Lymph #   1.5           Lymph%   41.6           Magnesium   1.6           MCH   27.6           MCHC   32.3           MCV   86           Mono #   0.4           Mono%   9.7           MPV   11.3           nRBC   0           Phosphorus   3.4     4.8     Platelets   107           Potassium   4.3     4.9     Total Protein   5.9           PTH   203.0           RBC   4.02           RDW   15.3           Sodium   143     142     Troponin I 21.869  Comment:  The reference interval for Troponin I represents the 99th percentile   cutoff   for our facility and is consistent with 3rd generation assay   performance.     24.788  Comment:  The reference interval for Troponin I represents the 99th percentile   cutoff   for our facility and is consistent with 3rd generation assay   performance.   31.124  Comment:  The reference interval for Troponin I represents the 99th percentile   cutoff   for our facility and is consistent with 3rd generation assay   performance.         WBC   3.70                            03/02/19  1247   03/02/19  1116        Immature Granulocytes         Immature Grans (Abs)         Albumin         Alkaline Phosphatase         ALT         Anion Gap         aPTT   38.4  Comment:  aPTT therapeutic range = 39-69 seconds     AST         Baso #         Basophil%         Total Bilirubin         BUN, Bld         Calcium         Chloride         CO2         CPK         Creatinine         Differential Method         eGFR if          eGFR if non          Eos #         Eosinophil%         Glucose         Gran # (ANC)         Gran%         Hematocrit         Hemoglobin         Lymph #         Lymph%         Magnesium         MCH         MCHC         MCV         Mono #         Mono%         MPV         nRBC         Phosphorus         Platelets         Potassium         Total Protein         PTH         RBC         RDW         Sodium         Troponin I 39.897  Comment:  The reference interval  for Troponin I represents the 99th percentile   cutoff   for our facility and is consistent with 3rd generation assay   performance.         WBC             All pertinent labs within the past 24 hours have been reviewed.    Significant Imaging: I have reviewed all pertinent imaging results/findings within the past 24 hours.    Assessment/Plan:      * NSTEMI (non-ST elevated myocardial infarction)    - Known CAD (see below for details regarding interventions)  - Admission troponin 14, peaked at 39.8. Admission ECG with NSTWA  - Treating conservatively with heparin gtt and DAPT load (completed)  - Otherwise continue home CAD regimen with coreg, asa, plavix, and atorvastatin. Reduced coreg to 12.5 given hypotension, holding lisinopril due to MICHAEL.  - TTE with concentric LVH, EF 60%, normal diastolic function, no WMA  - Cardiology following     Metabolic acidosis    - due to renal failure  - continue sodium bicarb       Non-traumatic rhabdomyolysis    - see MICHAEL       Encephalopathy, metabolic    - Improving, though not resolved  - CT head without acute changes, though it does show sequela of remote infarct and a remote lacunar infarction  - No evidence of infection. Blood cultures ordered in abundance of caution  - Serum ammonia levels wnl  - Tox screen + for opioids/benzos  - Suspect uremia from MICHAEL on CKD as a major contributor; Nephrology following     Acute renal failure superimposed on stage 3 chronic kidney disease    - Admission creatine 5.9, baseline unclear but appears in the 2-3.5 range; now improving  - Complicated by metabolic acidosis and uremia with confusion  - Likely prerenal etiology given improvement with fluids and possible rhabdo (CPK improving)  - s/p 2L NS in ED and given additional 1L NS slowly yesterday  - initiated on sodium bicarbonate for metabolic acidosis  - JESSEE, urine protein:creatinine, PTH, hepatitis panel pending  - Nephrology following, appreciate assistance     Cigarette nicotine  dependence without complication    - pre-comtemplative     Atherosclerotic peripheral vascular disease with rest pain    - Continue asa, plavix     S/P CABG x 1    - See CAD above     HLD (hyperlipidemia)    - continue atorvastatin     HTN (hypertension)    - initially hypotensive, likely 2/2 volume depletion  - continue coreg  - continue to hold amlodipine, HCTZ, clonidine, ACE-I  - may consider addition of further BP agents as tolerated if pressures uptrending today     Coronary artery disease involving native coronary artery of native heart without angina pectoris    - Complicated by NSTEMI as described above  - s/p 1V CABG (LIMA-LAD) s/p PCI to ostial LM (Mr 3.98fqo2iu) with flash ballooning and PCI to LCX/OM1 (2.59qgb16vf, 2.05nch89cf, 2.87enp27pa) 2013, pad S/P REIA stent ([id number])  - Continue asa, plavix, reduced-dose carvedilol, atorvastatin. Holding lisinopril  - ECG with NSTWA  - Cardiology following as described above       VTE Risk Mitigation (From admission, onward)        Ordered     heparin 25,000 units in dextrose 5% (100 units/ml) IV bolus from bag - ADDITIONAL PRN BOLUS - 60 units/kg (max bolus 4000 units)  As needed (PRN)      03/01/19 2122     heparin 25,000 units in dextrose 5% (100 units/ml) IV bolus from bag - ADDITIONAL PRN BOLUS - 30 units/kg (max bolus 4000 units)  As needed (PRN)      03/01/19 2122     heparin 25,000 units in dextrose 5% 250 mL (100 units/mL) infusion LOW INTENSITY nomogram - [facility]  Continuous      03/01/19 2122     IP VTE HIGH RISK PATIENT  Once      03/01/19 2212     Reason for No Pharmacological VTE Prophylaxis  Once      03/01/19 2212              Ranjit Bob MD  Department of Hospital Medicine   [facility]

## 2019-03-03 NOTE — SUBJECTIVE & OBJECTIVE
Interval History: Improved mentation from admission though still not totally normal. Patient hopes to leave the hospital ASAP but is ok with stay for further monitoring of renal/cardiac function. Denies any chest pain, shortness of breath, anxiety.    Review of Systems   Constitutional: Negative for chills, fatigue and fever.   Respiratory: Negative for shortness of breath.    Cardiovascular: Negative for chest pain, palpitations and leg swelling.   Genitourinary: Negative for difficulty urinating.   Neurological: Positive for tremors. Negative for dizziness and light-headedness.   Psychiatric/Behavioral: Positive for confusion. Negative for agitation.     Objective:     Vital Signs (Most Recent):  Temp: 98.6 °F (37 °C) (03/03/19 0804)  Pulse: 84 (03/03/19 0804)  Resp: 18 (03/03/19 0804)  BP: (!) 164/73 (03/03/19 0804)  SpO2: 97 % (03/03/19 0804) Vital Signs (24h Range):  Temp:  [98 °F (36.7 °C)-98.6 °F (37 °C)] 98.6 °F (37 °C)  Pulse:  [59-90] 84  Resp:  [16-18] 18  SpO2:  [95 %-99 %] 97 %  BP: (109-164)/(57-73) 164/73     Weight: 77 kg (169 lb 12.1 oz)  Body mass index is 25.07 kg/m².    Intake/Output Summary (Last 24 hours) at 3/3/2019 0939  Last data filed at 3/3/2019 0100  Gross per 24 hour   Intake 240 ml   Output --   Net 240 ml      Physical Exam   Constitutional: He is oriented to person, place, and time. No distress.   Disheveled appearing    Eyes: Pupils are equal, round, and reactive to light.   Cardiovascular: Normal rate and regular rhythm.   No murmur heard.  Pulmonary/Chest: Effort normal and breath sounds normal. No respiratory distress. He has no wheezes.   Abdominal: Soft. Bowel sounds are normal. He exhibits no distension. There is no tenderness.   Musculoskeletal: He exhibits no edema or tenderness.   Neurological: He is alert and oriented to person, place, and time.   + asterixis   Skin: Skin is warm and dry. Capillary refill takes less than 2 seconds. No rash noted. He is not diaphoretic. No  erythema.       Significant Labs:   Recent Lab Results       03/03/19  0702   03/03/19  0351   03/03/19  0050   03/02/19  1925   03/02/19  1602        Immature Granulocytes   0.3           Immature Grans (Abs)   0.01  Comment:  Mild elevation in immature granulocytes is non specific and   can be seen in a variety of conditions including stress response,   acute inflammation, trauma and pregnancy. Correlation with other   laboratory and clinical findings is essential.             Albumin   2.9     3.1     Alkaline Phosphatase   92           ALT   10           Anion Gap   12     13     aPTT 54.2  Comment:  aPTT therapeutic range = 39-69 seconds 54.2  Comment:  aPTT therapeutic range = 39-69 seconds   51.3  Comment:  aPTT therapeutic range = 39-69 seconds       AST   32           Baso #   0.01           Basophil%   0.3           Total Bilirubin   0.5  Comment:  For infants and newborns, interpretation of results should be based  on gestational age, weight and in agreement with clinical  observations.  Premature Infant recommended reference ranges:  Up to 24 hours.............<8.0 mg/dL  Up to 48 hours............<12.0 mg/dL  3-5 days..................<15.0 mg/dL  6-29 days.................<15.0 mg/dL             BUN, Bld   57     67     Calcium   9.2     9.3     Chloride   113     112     CO2   18     17     CPK   515           Creatinine   3.2     4.2     Differential Method   Automated           eGFR if    22.8     16.4     eGFR if non    19.7  Comment:  Calculation used to obtain the estimated glomerular filtration  rate (eGFR) is the CKD-EPI equation.        14.2  Comment:  Calculation used to obtain the estimated glomerular filtration  rate (eGFR) is the CKD-EPI equation.        Eos #   0.3           Eosinophil%   6.8           Glucose   95     97     Gran # (ANC)   1.5           Gran%   41.3           Hematocrit   34.4           Hemoglobin   11.1           Lymph #   1.5            Lymph%   41.6           Magnesium   1.6           MCH   27.6           MCHC   32.3           MCV   86           Mono #   0.4           Mono%   9.7           MPV   11.3           nRBC   0           Phosphorus   3.4     4.8     Platelets   107           Potassium   4.3     4.9     Total Protein   5.9           PTH   203.0           RBC   4.02           RDW   15.3           Sodium   143     142     Troponin I 21.869  Comment:  The reference interval for Troponin I represents the 99th percentile   cutoff   for our facility and is consistent with 3rd generation assay   performance.     24.788  Comment:  The reference interval for Troponin I represents the 99th percentile   cutoff   for our facility and is consistent with 3rd generation assay   performance.   31.124  Comment:  The reference interval for Troponin I represents the 99th percentile   cutoff   for our facility and is consistent with 3rd generation assay   performance.         WBC   3.70                            03/02/19  1247   03/02/19  1116        Immature Granulocytes         Immature Grans (Abs)         Albumin         Alkaline Phosphatase         ALT         Anion Gap         aPTT   38.4  Comment:  aPTT therapeutic range = 39-69 seconds     AST         Baso #         Basophil%         Total Bilirubin         BUN, Bld         Calcium         Chloride         CO2         CPK         Creatinine         Differential Method         eGFR if          eGFR if non          Eos #         Eosinophil%         Glucose         Gran # (ANC)         Gran%         Hematocrit         Hemoglobin         Lymph #         Lymph%         Magnesium         MCH         MCHC         MCV         Mono #         Mono%         MPV         nRBC         Phosphorus         Platelets         Potassium         Total Protein         PTH         RBC         RDW         Sodium         Troponin I 39.897  Comment:  The reference interval for Troponin I  represents the 99th percentile   cutoff   for our facility and is consistent with 3rd generation assay   performance.         WBC             All pertinent labs within the past 24 hours have been reviewed.    Significant Imaging: I have reviewed all pertinent imaging results/findings within the past 24 hours.

## 2019-03-03 NOTE — PLAN OF CARE
Problem: Adult Inpatient Plan of Care  Goal: Plan of Care Review  Outcome: Ongoing (interventions implemented as appropriate)  Patient remained in stable condition through shift. Remained free of falls and other injuries. Able to reposition self independently. Heparin drip infusing at 14 units/kg/hr at 10.8 ml/hr. Bed in locked and lowest position, call light in reach, all questions answered, declines any further needs at this time. Will continue to monitor.

## 2019-03-03 NOTE — PROGRESS NOTES
Patient refusing to go to US until after breakfast. US notified, instructed to call back when patient is willing. Will continue to monitor.

## 2019-03-03 NOTE — NURSING
Contacted venipuncture. appt scheduled for 0300. Advised they would send a  to collect the patient's labs.

## 2019-03-03 NOTE — ASSESSMENT & PLAN NOTE
- Admission creatine 5.9, baseline unclear but appears in the 2-3.5 range; now improving  - Complicated by metabolic acidosis and uremia with confusion  - Likely prerenal etiology given improvement with fluids and possible rhabdo (CPK improving)  - s/p 2L NS in ED and given additional 1L NS slowly yesterday  - initiated on sodium bicarbonate for metabolic acidosis  - JESSEE, urine protein:creatinine, PTH, hepatitis panel pending  - Nephrology following, appreciate assistance

## 2019-03-03 NOTE — ASSESSMENT & PLAN NOTE
- initially hypotensive, likely 2/2 volume depletion  - continue coreg  - continue to hold amlodipine, HCTZ, clonidine, ACE-I  - may consider addition of further BP agents as tolerated if pressures uptrending today

## 2019-03-03 NOTE — ASSESSMENT & PLAN NOTE
- Improving, though not resolved  - CT head without acute changes, though it does show sequela of remote infarct and a remote lacunar infarction  - No evidence of infection. Blood cultures ordered in abundance of caution  - Serum ammonia levels wnl  - Tox screen + for opioids/benzos  - Suspect uremia from MICHAEL on CKD as a major contributor; Nephrology following

## 2019-03-04 VITALS
DIASTOLIC BLOOD PRESSURE: 81 MMHG | OXYGEN SATURATION: 96 % | SYSTOLIC BLOOD PRESSURE: 157 MMHG | TEMPERATURE: 98 F | HEART RATE: 68 BPM | WEIGHT: 166.88 LBS | HEIGHT: 69 IN | BODY MASS INDEX: 24.72 KG/M2 | RESPIRATION RATE: 16 BRPM

## 2019-03-04 PROBLEM — G93.41 ENCEPHALOPATHY, METABOLIC: Status: RESOLVED | Noted: 2019-03-01 | Resolved: 2019-03-04

## 2019-03-04 LAB
ALBUMIN SERPL BCP-MCNC: 3 G/DL
ALP SERPL-CCNC: 88 U/L
ALT SERPL W/O P-5'-P-CCNC: 9 U/L
ANION GAP SERPL CALC-SCNC: 11 MMOL/L
APTT BLDCRRT: 43.3 SEC
AST SERPL-CCNC: 17 U/L
BASOPHILS # BLD AUTO: 0.02 K/UL
BASOPHILS NFR BLD: 0.4 %
BILIRUB SERPL-MCNC: 0.4 MG/DL
BUN SERPL-MCNC: 41 MG/DL
CALCIUM SERPL-MCNC: 9 MG/DL
CHLORIDE SERPL-SCNC: 111 MMOL/L
CK SERPL-CCNC: 188 U/L
CO2 SERPL-SCNC: 21 MMOL/L
CREAT SERPL-MCNC: 2.3 MG/DL
CREAT UR-MCNC: 45 MG/DL
DIFFERENTIAL METHOD: ABNORMAL
EOSINOPHIL # BLD AUTO: 0.4 K/UL
EOSINOPHIL NFR BLD: 8.6 %
ERYTHROCYTE [DISTWIDTH] IN BLOOD BY AUTOMATED COUNT: 15.5 %
EST. GFR  (AFRICAN AMERICAN): 33.9 ML/MIN/1.73 M^2
EST. GFR  (NON AFRICAN AMERICAN): 29.3 ML/MIN/1.73 M^2
GLUCOSE SERPL-MCNC: 99 MG/DL
HAV IGM SERPL QL IA: NEGATIVE
HBV CORE IGM SERPL QL IA: NEGATIVE
HBV SURFACE AG SERPL QL IA: NEGATIVE
HCT VFR BLD AUTO: 35 %
HCV AB SERPL QL IA: NEGATIVE
HGB BLD-MCNC: 11.4 G/DL
IMM GRANULOCYTES # BLD AUTO: 0.02 K/UL
IMM GRANULOCYTES NFR BLD AUTO: 0.4 %
LYMPHOCYTES # BLD AUTO: 2 K/UL
LYMPHOCYTES NFR BLD: 42.4 %
MAGNESIUM SERPL-MCNC: 1.5 MG/DL
MCH RBC QN AUTO: 27.7 PG
MCHC RBC AUTO-ENTMCNC: 32.6 G/DL
MCV RBC AUTO: 85 FL
MONOCYTES # BLD AUTO: 0.4 K/UL
MONOCYTES NFR BLD: 9.1 %
NEUTROPHILS # BLD AUTO: 1.9 K/UL
NEUTROPHILS NFR BLD: 39.1 %
NRBC BLD-RTO: 0 /100 WBC
PHOSPHATE SERPL-MCNC: 2.6 MG/DL
PLATELET # BLD AUTO: 129 K/UL
PMV BLD AUTO: 11.4 FL
POTASSIUM SERPL-SCNC: 4 MMOL/L
PROT SERPL-MCNC: 6.1 G/DL
PROT UR-MCNC: 20 MG/DL
PROT/CREAT UR: 0.44 MG/G{CREAT}
RBC # BLD AUTO: 4.11 M/UL
SODIUM SERPL-SCNC: 143 MMOL/L
WBC # BLD AUTO: 4.74 K/UL

## 2019-03-04 PROCEDURE — 84100 ASSAY OF PHOSPHORUS: CPT

## 2019-03-04 PROCEDURE — 25000003 PHARM REV CODE 250: Performed by: HOSPITALIST

## 2019-03-04 PROCEDURE — 25000003 PHARM REV CODE 250: Performed by: INTERNAL MEDICINE

## 2019-03-04 PROCEDURE — 82550 ASSAY OF CK (CPK): CPT

## 2019-03-04 PROCEDURE — 82570 ASSAY OF URINE CREATININE: CPT

## 2019-03-04 PROCEDURE — 99232 SBSQ HOSP IP/OBS MODERATE 35: CPT | Mod: ,,, | Performed by: INTERNAL MEDICINE

## 2019-03-04 PROCEDURE — 99239 PR HOSPITAL DISCHARGE DAY,>30 MIN: ICD-10-PCS | Mod: ,,, | Performed by: HOSPITALIST

## 2019-03-04 PROCEDURE — 36415 COLL VENOUS BLD VENIPUNCTURE: CPT

## 2019-03-04 PROCEDURE — 85730 THROMBOPLASTIN TIME PARTIAL: CPT

## 2019-03-04 PROCEDURE — 99239 HOSP IP/OBS DSCHRG MGMT >30: CPT | Mod: ,,, | Performed by: HOSPITALIST

## 2019-03-04 PROCEDURE — 80053 COMPREHEN METABOLIC PANEL: CPT

## 2019-03-04 PROCEDURE — 85025 COMPLETE CBC W/AUTO DIFF WBC: CPT

## 2019-03-04 PROCEDURE — 83735 ASSAY OF MAGNESIUM: CPT

## 2019-03-04 PROCEDURE — 99232 PR SUBSEQUENT HOSPITAL CARE,LEVL II: ICD-10-PCS | Mod: ,,, | Performed by: INTERNAL MEDICINE

## 2019-03-04 RX ORDER — SODIUM BICARBONATE 650 MG/1
650 TABLET ORAL 2 TIMES DAILY
Qty: 120 TABLET | Refills: 11 | COMMUNITY
Start: 2019-03-04 | End: 2020-10-29

## 2019-03-04 RX ORDER — ATORVASTATIN CALCIUM 80 MG/1
80 TABLET, FILM COATED ORAL DAILY
Qty: 90 TABLET | Refills: 3 | Status: SHIPPED | OUTPATIENT
Start: 2019-03-05 | End: 2019-03-11 | Stop reason: SDUPTHER

## 2019-03-04 RX ADMIN — ATORVASTATIN CALCIUM 80 MG: 20 TABLET, FILM COATED ORAL at 08:03

## 2019-03-04 RX ADMIN — CARVEDILOL 12.5 MG: 12.5 TABLET, FILM COATED ORAL at 08:03

## 2019-03-04 RX ADMIN — ASPIRIN 81 MG: 81 TABLET, COATED ORAL at 08:03

## 2019-03-04 RX ADMIN — CLOPIDOGREL 75 MG: 75 TABLET, FILM COATED ORAL at 08:03

## 2019-03-04 RX ADMIN — SODIUM BICARBONATE 650 MG TABLET 1300 MG: at 08:03

## 2019-03-04 NOTE — ASSESSMENT & PLAN NOTE
- Complicated by NSTEMI as described above  - s/p 1V CABG (LIMA-LAD) s/p PCI to ostial LM (Mr 3.07oun4vl) with flash ballooning and PCI to LCX/OM1 (2.87bnp14ne, 2.72qxj24zd, 2.53kpm68ux) 2013, pad S/P REIA stent ([id number])  - Continue asa, plavix, carvedilol, atorvastatin. Holding lisinopril for now given MICHAEL  - ECG with NSTWA  - Cardiology following as described above; will need interventional cardiology f/u

## 2019-03-04 NOTE — PLAN OF CARE
03/04/19 1034   Discharge Assessment   Assessment Type Discharge Planning Assessment   Confirmed/corrected address and phone number on facesheet? Yes   Assessment information obtained from? Patient;Medical Record   Expected Length of Stay (days) 4   Communicated expected length of stay with patient/caregiver yes   Prior to hospitilization cognitive status: Alert/Oriented   Prior to hospitalization functional status: Independent   Current cognitive status: Alert/Oriented   Current Functional Status: Independent   Lives With alone   Able to Return to Prior Arrangements yes   Is patient able to care for self after discharge? Yes   Patient's perception of discharge disposition home or selfcare   Readmission Within the Last 30 Days no previous admission in last 30 days   Patient currently being followed by outpatient case management? No   Patient currently receives any other outside agency services? No   Equipment Currently Used at Home wheelchair;walker, rolling;cane, straight;crutches   Do you have any problems affording any of your prescribed medications? TBD   Is the patient taking medications as prescribed? yes   Does the patient have transportation home? Yes   Transportation Anticipated family or friend will provide   Does the patient receive services at the [facility]madin Clinic? No   Discharge Plan A Home   DME Needed Upon Discharge  none   Patient/Family in Agreement with Plan yes   Admitted with NSTEMI. Lives alone and is independent in his ADLs. Plan is to DC home. No DC needs identified.

## 2019-03-04 NOTE — ASSESSMENT & PLAN NOTE
63 year old male reportedly found altered at home with unknown down-time. NSTEMI diagnosis on current admission. Cardiology following and pt on ASA/Plavix/Hep gtt.  Nephrology is consulted for evaluation of MICHAEL on CKD3 with mental status changes concerning for uremia. Pt also has notable elevations of CPK.     -- MICHAEL on admission with Cr of 5.9 and BUN at 78.  -- Baseline Cr ~ 2.1  -- Hyperkalemia of 5.3   -- NAGMA with bicarb of 18.  -- Lactic acid negative  -- Hyperphosphatemic at 6.8   -- UA is unemarkable  -- Findings c/w hemodynamic rhabdomyolysis  -- CPK elevated at 1194 down-trending to 1099, most recently down to 188    Plan  -- Mental status improving  -- Strict I/O's.  -- Renal function panel daily  -- Continue bicarbonate replacement - metabolic acidosis improved  -- Low K diet  -- Avoid nephrotoxic medications  -- Creatinine markedly improved today (2.3) - near baseline  -- F/U in Nephrology clinic

## 2019-03-04 NOTE — ASSESSMENT & PLAN NOTE
- initially hypotensive, likely 2/2 volume depletion  - continue coreg and resume amlodipine on discharge  - continue to hold amlodipine, HCTZ, clonidine, ACE-I for now but may need to increase in future  - 1 week f/u BMP and BP check with PCP  - f/u with outpatient Nephrologist

## 2019-03-04 NOTE — ASSESSMENT & PLAN NOTE
- Known CAD (see below for details regarding interventions)  - Admission troponin 14, peaked at 39.8. Admission ECG with NSTWA  - Treated conservatively with heparin gtt and DAPT load (completed) given severe MICHAEL and concern about IV contrast  - Otherwise continue home CAD regimen with coreg, asa, plavix, and atorvastatin. Holding lisinopril due to MICHAEL, should resume if stable at next lab draw  - TTE with concentric LVH, EF 60%, normal diastolic function, no WMA  - Cardiology followed while in house; will need interventional cards as outpatient (referral sent)

## 2019-03-04 NOTE — ASSESSMENT & PLAN NOTE
- Resolved  - CT head without acute changes, though it does show sequela of remote infarct and a remote lacunar infarction  - No evidence of infection. Blood cultures ordered in abundance of caution and NGTD  - Serum ammonia levels wnl  - Tox screen + for opioids/benzos  - Suspect uremia from MICHAEL on CKD as a major contributor; Nephrology following

## 2019-03-04 NOTE — SUBJECTIVE & OBJECTIVE
Interval History: NAEON. Mental status improving. Marked improvement in sCr today at 2.3.    Review of patient's allergies indicates:   Allergen Reactions    Iodine and iodide containing products      Anaphylactic rxn      Current Facility-Administered Medications   Medication Frequency    aspirin EC tablet 81 mg Daily    atorvastatin tablet 80 mg Daily    atorvastatin tablet 80 mg Once    carvedilol tablet 12.5 mg BID WM    clopidogrel tablet 75 mg Daily    dextrose 50% injection 12.5 g PRN    dextrose 50% injection 25 g PRN    glucagon (human recombinant) injection 1 mg PRN    glucose chewable tablet 16 g PRN    glucose chewable tablet 24 g PRN    heparin 25,000 units in dextrose 5% (100 units/ml) IV bolus from bag - ADDITIONAL PRN BOLUS - 30 units/kg (max bolus 4000 units) PRN    heparin 25,000 units in dextrose 5% (100 units/ml) IV bolus from bag - ADDITIONAL PRN BOLUS - 60 units/kg (max bolus 4000 units) PRN    heparin 25,000 units in dextrose 5% 250 mL (100 units/mL) infusion LOW INTENSITY nomogram - [facility] Continuous    lorazepam injection 1 mg Q2H PRN    ondansetron disintegrating tablet 8 mg Q8H PRN    sodium bicarbonate tablet 1,300 mg BID    sodium chloride 0.9% flush 5 mL PRN       Objective:     Vital Signs (Most Recent):  Temp: 98.3 °F (36.8 °C) (03/03/19 2200)  Pulse: 108 (03/04/19 0852)  Resp: 16 (03/04/19 0852)  BP: (!) 157/81 (03/04/19 0852)  SpO2: 96 % (03/04/19 0852)  O2 Device (Oxygen Therapy): room air (03/03/19 0804) Vital Signs (24h Range):  Temp:  [98.3 °F (36.8 °C)] 98.3 °F (36.8 °C)  Pulse:  [] 108  Resp:  [13-19] 16  SpO2:  [96 %-98 %] 96 %  BP: (123-157)/(66-81) 157/81     Weight: 75.7 kg (166 lb 14.2 oz) (03/04/19 0400)  Body mass index is 24.65 kg/m².  Body surface area is 1.92 meters squared.    I/O last 3 completed shifts:  In: 480 [P.O.:480]  Out: 750 [Urine:750]    Physical Exam   Constitutional: No distress.   Disheveled appearing    Eyes: Pupils are equal,  round, and reactive to light.   Cardiovascular: Normal rate and regular rhythm.   No murmur heard.  Pulmonary/Chest: Effort normal and breath sounds normal. No respiratory distress. He has no wheezes.   Abdominal: Soft. Bowel sounds are normal. He exhibits no distension. There is no tenderness.   Musculoskeletal: He exhibits no edema or tenderness.   Neurological:   -- Mental status improving  -- AAOx3   Skin: Skin is warm and dry. Capillary refill takes less than 2 seconds. No rash noted. He is not diaphoretic. No erythema.       Significant Labs:  All labs within the past 24 hours have been reviewed.

## 2019-03-04 NOTE — DISCHARGE SUMMARY
"[facility] Hospital Medicine  Discharge Summary      Patient Name: Emanuel Daniel  MRN: [id number]  Admission Date: 3/1/2019  Hospital Length of Stay: 3 days  Discharge Date and Time:  03/04/2019 12:09 PM  Attending Physician: Ranjit Bob, *   Discharging Provider: Ranjit Bob MD  Primary Care Provider: Tom Diamond MD  Hospital Medicine Team: [facility] Ranjit Bob MD    HPI:   Mr. Daniel is a 63-year-old man with HTN, HLD, tobacco abuse, EtOH abuse, CAD s/p 1V CABG (LIMA-LAD) s/p PCI to ostial LM (Mr 3.19inn6hb) with flash ballooning and PCI to LCX/OM1 (2.86hcf77hu, 2.14smq36md, 2.25ntw72mq) 2013, pad S/P REIA stent ([id number]) who presented with confusion. The following history was obtained largely from chart review as he is currently incapable of more than "yes/no" responses after prolonged delays. He was in his usual state of health until yesterday, when he found asleep by his friend and was confused to date/time. His friend thought he was sleepy from a pain medication or too much alcohol, so he let him sleep it off. When Mr. Daniel was persistently confused in the morning and minimally communicative, the friend brought him to the ED. His mental status cleared somewhat to the extent that he very clearly denies any chest pain, shortness of breath, palpitations, syncope, and edema. He agrees that his PO intake has been decreased lately but cannot provide any additional information    On arrival here he is hemodynamically stable though with some low normal BP measurements. Lab workup significant for MICHAEL with creatinine 5.9, BUN 78, hyperkalemia with K 5.3, NAGMA with bicarb 18, and hyperphosphatemia at 6.8. His troponin was elevated at 14, and his CPK at 1194. ECG unchanged from prior with NSTWA. Cardiology was consulted, who recommended medical therapy for NSTEMI given his lack of symptoms and worsening renal function     * No surgery found *  "     Hospital Course:   Mr. Daniel presented with NSTEMI, MICHAEL on CKD4, and metabolic encephalopathy likely due to uremia. He was managed medically for NSTEMI with 48hr heparin gtt, plavix, ASA, coreg, and atorvastatin. Troponin peak at 39 without any recurrence of chest pain or discomfort. He was initially bolused 2L NS in ED, then further rehydrated with a slow drip with improvement in renal function to baseline. Mentation improved as well during this time. He had initially been hypotensive on presentation and home clonidine, HCTZ, ACE, and amlodipine were held. No angiography performed while in house due to concerns for precipitation of further renal failure. He will be discharged on medical therapy and have outpatient f/u with Interventional Cardiology and Nephrology with 1 week BMP. For problem based discussion, see below.    Vitals:    03/04/19 0400 03/04/19 0500 03/04/19 0852 03/04/19 1109   BP:  (!) 144/74 (!) 157/81    BP Location:       Patient Position:       Pulse:  74 108 68   Resp:  19 16    Temp:       TempSrc:       SpO2:  96% 96%    Weight: 75.7 kg (166 lb 14.2 oz)      Height:           Physical Exam   Constitutional: He is oriented to person, place, and time. No distress.   Disheveled appearing    Eyes: Pupils are equal, round, and reactive to light.   Cardiovascular: Normal rate and regular rhythm.   No murmur heard.  Pulmonary/Chest: Effort normal and breath sounds normal. No respiratory distress. He has no wheezes.   Abdominal: Soft. Bowel sounds are normal. He exhibits no distension. There is no tenderness.   Musculoskeletal: He exhibits no edema or tenderness.   Neurological: He is alert and oriented to person, place, and time.   - asterixis   Skin: Skin is warm and dry. Capillary refill takes less than 2 seconds. No rash noted. He is not diaphoretic. No erythema.       Consults:   Consults (From admission, onward)        Status Ordering Provider     Inpatient consult to Nephrology  Once      Provider:  (Not yet assigned)    Completed ELIS MAHMOOD          * NSTEMI (non-ST elevated myocardial infarction)    - Known CAD (see below for details regarding interventions)  - Admission troponin 14, peaked at 39.8. Admission ECG with NSTWA  - Treated conservatively with heparin gtt and DAPT load (completed) given severe MICHAEL and concern about IV contrast  - Otherwise continue home CAD regimen with coreg, asa, plavix, and atorvastatin. Holding lisinopril due to MICHAEL, should resume if stable at next lab draw  - TTE with concentric LVH, EF 60%, normal diastolic function, no WMA  - Cardiology followed while in house; will need interventional cards as outpatient (referral sent)     Metabolic acidosis    - due to renal failure  - continue sodium bicarb 650mg bid       Non-traumatic rhabdomyolysis    - see MICHAEL       Acute renal failure superimposed on stage 3 chronic kidney disease    - Admission creatine 5.9, baseline unclear but appears in the 2-3.5 range; now improved to 2.3 following IVF resuscitation  - Complicated by metabolic acidosis and uremia with confusion, now improved  - Likely prerenal etiology given improvement with fluids  - s/p 2L NS in ED and additional 1L NS  - initiated on sodium bicarbonate 650mg bid for metabolic acidosis  - Nephrology following, appreciate assistance  - will need outpatient f/u in 1 week with his outpatient nephrologist with repeat BMP and BP check     Cigarette nicotine dependence without complication    - pre-comtemplative     Atherosclerotic peripheral vascular disease with rest pain    - Continue asa, plavix     S/P CABG x 1    - See CAD above     HLD (hyperlipidemia)    - continue atorvastatin     HTN (hypertension)    - initially hypotensive, likely 2/2 volume depletion  - continue coreg and resume amlodipine on discharge  - continue to hold amlodipine, HCTZ, clonidine, ACE-I for now but may need to increase in future  - 1 week f/u BMP and BP check with PCP  - f/u with  outpatient Nephrologist     Coronary artery disease involving native coronary artery of native heart without angina pectoris    - Complicated by NSTEMI as described above  - s/p 1V CABG (LIMA-LAD) s/p PCI to ostial LM (Mr 3.53vhk5nh) with flash ballooning and PCI to LCX/OM1 (2.36ugl31ao, 2.92xfp08ak, 2.35nsu92kn) 2013, pad S/P REIA stent ([id number])  - Continue asa, plavix, carvedilol, atorvastatin. Holding lisinopril for now given MICHAEL  - ECG with NSTWA  - Cardiology following as described above; will need interventional cardiology f/u     Encephalopathy, metabolic-resolved as of 3/4/2019    - Resolved  - CT head without acute changes, though it does show sequela of remote infarct and a remote lacunar infarction  - No evidence of infection. Blood cultures ordered in abundance of caution and NGTD  - Serum ammonia levels wnl  - Tox screen + for opioids/benzos  - Suspect uremia from MICHAEL on CKD as a major contributor; Nephrology following       Final Active Diagnoses:    Diagnosis Date Noted POA    PRINCIPAL PROBLEM:  NSTEMI (non-ST elevated myocardial infarction) [I21.4] 10/05/2014 Yes    Non-traumatic rhabdomyolysis [M62.82] 03/03/2019 Yes    Metabolic acidosis [E87.2] 03/03/2019 Yes    Acute renal failure superimposed on stage 3 chronic kidney disease [N17.9, N18.3] 03/01/2019 Yes    Atherosclerotic peripheral vascular disease with rest pain [I70.229] 12/18/2018 Yes    Cigarette nicotine dependence without complication [F17.210] 12/18/2018 Yes    S/P CABG x 1 [Z95.1] 08/27/2013 Not Applicable    Coronary artery disease involving native coronary artery of native heart without angina pectoris [I25.10] 08/05/2013 Yes    HLD (hyperlipidemia) [E78.5] 08/05/2013 Yes     Chronic    HTN (hypertension) [I10] 08/05/2013 Yes     Chronic      Problems Resolved During this Admission:    Diagnosis Date Noted Date Resolved POA    Encephalopathy, metabolic [G93.41] 03/01/2019 03/04/2019 Yes       Discharged Condition:  good    Disposition: Home or Self Care    Follow Up:  Follow-up Information     [facility] [place] [facility] In 1 week.    Specialty:  Interventional Cardiology  Contact information:  [address]  [facility] [id number]  [phone]           Tom Diamond MD.    Specialty:  Cardiology  Contact information:  [place] [id number]  [phone]             Outpatient Nephrologist In 1 week.    Why:  For follow up of kidney function               Patient Instructions:      RENAL FUNCTION PANEL   Standing Status: Future Standing Exp. Date: 05/02/20     Ambulatory Referral to Interventional Cardiology   Referral Priority: Routine Referral Type: Consultation   Referral Reason: Specialty Services Required   Requested Specialty: INTERVENTIONAL CARDIOLOGY   Number of Visits Requested: 1     Diet renal     Notify your health care provider if you experience any of the following:  severe uncontrolled pain     Notify your health care provider if you experience any of the following:  persistent nausea and vomiting or diarrhea     Notify your health care provider if you experience any of the following:  difficulty breathing or increased cough     Activity as tolerated       Significant Diagnostic Studies: Labs:   BMP:   Recent Labs   Lab 03/02/19  1602 03/03/19  0351 03/04/19  0540   GLU 97 95 99    143 143   K 4.9 4.3 4.0   * 113* 111*   CO2 17* 18* 21*   BUN 67* 57* 41*   CREATININE 4.2* 3.2* 2.3*   CALCIUM 9.3 9.2 9.0   MG  --  1.6 1.5*   , CMP   Recent Labs   Lab 03/02/19  1602 03/03/19  0351 03/04/19  0540    143 143   K 4.9 4.3 4.0   * 113* 111*   CO2 17* 18* 21*   GLU 97 95 99   BUN 67* 57* 41*   CREATININE 4.2* 3.2* 2.3*   CALCIUM 9.3 9.2 9.0   PROT  --  5.9* 6.1   ALBUMIN 3.1* 2.9* 3.0*   BILITOT  --  0.5 0.4   ALKPHOS  --  92 88   AST  --  32 17   ALT  --  10 9*   ANIONGAP 13 12 11   ESTGFRAFRICA 16.4* 22.8* 33.9*   EGFRNONAA 14.2* 19.7* 29.3*   , CBC    Recent Labs   Lab 03/03/19  0351 03/04/19  0540   WBC 3.70* 4.74   HGB 11.1* 11.4*   HCT 34.4* 35.0*   * 129*   , INR   Lab Results   Component Value Date    INR 1.0 03/01/2019    INR 1.0 10/04/2014    INR 1.0 07/30/2014   , Lipid Panel   Lab Results   Component Value Date    CHOL 226 (H) 11/08/2017    HDL 23 (L) 11/08/2017    LDLCALC Invalid, Trig>400.0 11/08/2017    TRIG 691 (H) 11/08/2017    CHOLHDL 10.2 (L) 11/08/2017   , Troponin   Recent Labs   Lab 03/03/19  0702   TROPONINI 21.869*   , A1C: No results for input(s): HGBA1C in the last 4320 hours. and All labs within the past 24 hours have been reviewed  Microbiology:   Blood Culture   Lab Results   Component Value Date    LABBLOO No Growth to date 03/01/2019    LABBLOO No Growth to date 03/01/2019    LABBLOO No Growth to date 03/01/2019     Cardiac Graphics: Echocardiogram:   Transthoracic echo (TTE) complete (Cupid Only):   Results for orders placed or performed during the hospital encounter of 03/01/19   Transthoracic echo (TTE) complete (Cupid Only)   Result Value Ref Range    Ascending aorta 3.45 cm    STJ 3.42 cm    AV mean gradient 3.50 mmHg    Ao peak raciel 1.35 m/s    Ao VTI 25.04 cm    IVS 1.15 (A) 0.6 - 1.1 cm    LA size 3.63 cm    Left Atrium Major Axis 5.49 cm    Left Atrium Minor Axis 4.86 cm    LVIDD 5.28 3.5 - 6.0 cm    LVIDS 3.34 2.1 - 4.0 cm    LVOT diameter 1.99 cm    LVOT peak VTI 22.03 cm    PW 1.22 (A) 0.6 - 1.1 cm    RA Major Axis 4.83 cm    RA Width 3.19 cm    RVDD 3.28 cm    Sinus 4.09 cm    TAPSE 2.33 cm    TDI LATERAL 0.12     TDI SEPTAL 0.09     LA WIDTH 3.08 cm    LV Diastolic Volume 134.06 mL    LV Systolic Volume 45.57 mL    LVOT peak raciel [id number] m/s    FS 37 %    LA volume 49.00 cm3    LV mass 250.62 g    Left Ventricle Relative Wall Thickness 0.46 cm    AV valve area 2.73 cm2    AV Velocity Ratio 0.84     AV index (prosthetic) 0.88     Mean e' 0.11     LVOT area 3.11 cm2    LVOT stroke volume 68.48 cm3    AV peak  gradient 7.29 mmHg    LV Systolic Volume Index 23.6 mL/m2    LV Diastolic Volume Index 69.37 mL/m2    LA Volume Index 25.4 mL/m2    LV Mass Index 129.7 g/m2    BSA 1.94 m2    Right Atrial Pressure (from IVC) 3 mmHg       Pending Diagnostic Studies:     None         Medications:  Reconciled Home Medications:      Medication List      START taking these medications    sodium bicarbonate 650 MG tablet  Take 1 tablet (650 mg total) by mouth 2 (two) times daily.        CHANGE how you take these medications    atorvastatin 80 MG tablet  Commonly known as:  LIPITOR  Take 1 tablet (80 mg total) by mouth once daily.  Start taking on:  3/5/2019  What changed:    · medication strength  · See the new instructions.        CONTINUE taking these medications    allopurinol 100 MG tablet  Commonly known as:  ZYLOPRIM  Take 100 mg by mouth once daily.     amLODIPine 10 MG tablet  Commonly known as:  NORVASC  amlodipine 10 mg tablet q HS     aspirin 81 MG EC tablet  Commonly known as:  ECOTRIN  Take 81 mg by mouth once daily.     ATIVAN 0.5 MG tablet  Generic drug:  LORazepam  Ativan 0.5 mg tablet   Take 1 tablet by oral route at bedtime.     carvedilol 25 MG tablet  Commonly known as:  COREG  carvedilol 25 mg tablet TWO TIMES A DAY     clopidogrel 75 mg tablet  Commonly known as:  PLAVIX  clopidogrel 75 mg tablet     nitroGLYCERIN 0.4 MG SL tablet  Commonly known as:  NITROSTAT  nitroglycerin 0.4 mg sublingual tablet        STOP taking these medications    cloNIDine 0.2 MG tablet  Commonly known as:  CATAPRES     hydroCHLOROthiazide 25 MG tablet  Commonly known as:  HYDRODIURIL     lisinopril 20 MG tablet  Commonly known as:  PRINIVIL,ZESTRIL     metoprolol tartrate 25 MG tablet  Commonly known as:  LOPRESSOR            Indwelling Lines/Drains at time of discharge:   Lines/Drains/Airways          None          Time spent on the discharge of patient: 40 minutes  Patient was seen and examined on the date of discharge and determined to  be suitable for discharge.         Ranjit Bob MD  Department of Hospital Medicine  [facility]

## 2019-03-04 NOTE — PROGRESS NOTES
"[facility]  Nephrology  Progress Note    Patient Name: Emanuel Daniel  MRN: [id number]   Admission Date: 3/1/2019  Hospital Length of Stay: 3 days  Attending Provider: Ranjit Bob, *   Primary Care Physician: Tom Diamond MD  Principal Problem:NSTEMI (non-ST elevated myocardial infarction)    Subjective:     HPI: Mr. Daniel is a 63-year-old man with HTN, HLD, tobacco abuse, EtOH abuse, CAD s/p 1V CABG (LIMA-LAD) s/p PCI to ostial LM (Mr 3.46bhq6va) with flash ballooning and PCI to LCX/OM1 (2.56vlx13up, 2.79svy74vz, 2.17qjp13fp) 2013, pad S/P REIA stent ([id number]) who presented with confusion. The following history was obtained largely from chart review as he is currently incapable of more than "yes/no" responses after prolonged delays. He was in his usual state of health until yesterday, when he found asleep by his friend and was confused to date/time. His friend thought he was sleepy from a pain medication or too much alcohol, so he let him sleep it off. When Mr. Daniel was persistently confused in the morning and minimally communicative, the friend brought him to the ED. His mental status cleared somewhat to the extent that he very clearly denies any chest pain, shortness of breath, palpitations, syncope, and edema. He agrees that his PO intake has been decreased lately but cannot provide any additional information     On arrival here he is hemodynamically stable though with some low normal BP measurements. Lab workup significant for MICHAEL with creatinine 5.9, BUN 78, hyperkalemia with K 5.3, NAGMA with bicarb 18, and hyperphosphatemia at 6.8. His troponin was elevated at 14, and his CPK at 1194. ECG unchanged from prior with NSTWA. Cardiology was consulted, who recommended medical therapy for NSTEMI given his lack of symptoms and worsening renal function      Interval History: NAEON. Mental status improving. Marked improvement in sCr today at 2.3.    Review of patient's " allergies indicates:   Allergen Reactions    Iodine and iodide containing products      Anaphylactic rxn      Current Facility-Administered Medications   Medication Frequency    aspirin EC tablet 81 mg Daily    atorvastatin tablet 80 mg Daily    atorvastatin tablet 80 mg Once    carvedilol tablet 12.5 mg BID WM    clopidogrel tablet 75 mg Daily    dextrose 50% injection 12.5 g PRN    dextrose 50% injection 25 g PRN    glucagon (human recombinant) injection 1 mg PRN    glucose chewable tablet 16 g PRN    glucose chewable tablet 24 g PRN    heparin 25,000 units in dextrose 5% (100 units/ml) IV bolus from bag - ADDITIONAL PRN BOLUS - 30 units/kg (max bolus 4000 units) PRN    heparin 25,000 units in dextrose 5% (100 units/ml) IV bolus from bag - ADDITIONAL PRN BOLUS - 60 units/kg (max bolus 4000 units) PRN    heparin 25,000 units in dextrose 5% 250 mL (100 units/mL) infusion LOW INTENSITY nomogram - [facility] Continuous    lorazepam injection 1 mg Q2H PRN    ondansetron disintegrating tablet 8 mg Q8H PRN    sodium bicarbonate tablet 1,300 mg BID    sodium chloride 0.9% flush 5 mL PRN       Objective:     Vital Signs (Most Recent):  Temp: 98.3 °F (36.8 °C) (03/03/19 2200)  Pulse: 108 (03/04/19 0852)  Resp: 16 (03/04/19 0852)  BP: (!) 157/81 (03/04/19 0852)  SpO2: 96 % (03/04/19 0852)  O2 Device (Oxygen Therapy): room air (03/03/19 0804) Vital Signs (24h Range):  Temp:  [98.3 °F (36.8 °C)] 98.3 °F (36.8 °C)  Pulse:  [] 108  Resp:  [13-19] 16  SpO2:  [96 %-98 %] 96 %  BP: (123-157)/(66-81) 157/81     Weight: 75.7 kg (166 lb 14.2 oz) (03/04/19 0400)  Body mass index is 24.65 kg/m².  Body surface area is 1.92 meters squared.    I/O last 3 completed shifts:  In: 480 [P.O.:480]  Out: 750 [Urine:750]    Physical Exam   Constitutional: No distress.   Disheveled appearing    Eyes: Pupils are equal, round, and reactive to light.   Cardiovascular: Normal rate and regular rhythm.   No murmur  heard.  Pulmonary/Chest: Effort normal and breath sounds normal. No respiratory distress. He has no wheezes.   Abdominal: Soft. Bowel sounds are normal. He exhibits no distension. There is no tenderness.   Musculoskeletal: He exhibits no edema or tenderness.   Neurological:   -- Mental status improving  -- AAOx3   Skin: Skin is warm and dry. Capillary refill takes less than 2 seconds. No rash noted. He is not diaphoretic. No erythema.       Significant Labs:  All labs within the past 24 hours have been reviewed.       Assessment/Plan:     Acute renal failure superimposed on stage 3 chronic kidney disease    63 year old male reportedly found altered at home with unknown down-time. NSTEMI diagnosis on current admission. Cardiology following and pt on ASA/Plavix/Hep gtt.  Nephrology is consulted for evaluation of MICHAEL on CKD3 with mental status changes concerning for uremia. Pt also has notable elevations of CPK.     -- MICHAEL on admission with Cr of 5.9 and BUN at 78.  -- Baseline Cr ~ 2.1  -- Hyperkalemia of 5.3   -- NAGMA with bicarb of 18.  -- Lactic acid negative  -- Hyperphosphatemic at 6.8   -- UA is unemarkable  -- Findings c/w hemodynamic rhabdomyolysis  -- CPK elevated at 1194 down-trending to 1099, most recently down to 188    Plan  -- Mental status improving  -- Strict I/O's.  -- Renal function panel daily  -- Continue bicarbonate replacement - metabolic acidosis improved  -- Low K diet  -- Avoid nephrotoxic medications  -- Creatinine markedly improved today (2.3) - near baseline  -- f/u in nephrology clinic         Thank you for your consult. I will sign off. Please contact us if you have any additional questions.    Jassi Jamil MD  Nephrology  [facility]

## 2019-03-04 NOTE — ASSESSMENT & PLAN NOTE
- Admission creatine 5.9, baseline unclear but appears in the 2-3.5 range; now improved to 2.3 following IVF resuscitation  - Complicated by metabolic acidosis and uremia with confusion, now improved  - Likely prerenal etiology given improvement with fluids  - s/p 2L NS in ED and additional 1L NS  - initiated on sodium bicarbonate 650mg bid for metabolic acidosis  - Nephrology following, appreciate assistance  - will need outpatient f/u in 1 week with his outpatient nephrologist with repeat BMP and BP check

## 2019-03-06 ENCOUNTER — PATIENT OUTREACH (OUTPATIENT)
Dept: ADMINISTRATIVE | Facility: CLINIC | Age: 64
End: 2019-03-06

## 2019-03-06 ENCOUNTER — TELEPHONE (OUTPATIENT)
Dept: HEPATOLOGY | Facility: CLINIC | Age: 64
End: 2019-03-06

## 2019-03-06 LAB
BACTERIA BLD CULT: NORMAL
BACTERIA BLD CULT: NORMAL

## 2019-03-06 NOTE — PLAN OF CARE
03/06/19 0717   Final Note   Assessment Type Final Discharge Note   Anticipated Discharge Disposition Home   Hospital Follow Up  Appt(s) scheduled? Yes

## 2019-03-06 NOTE — TELEPHONE ENCOUNTER
----- Message from Madelyn Bonilla sent at 3/6/2019 12:10 PM CST -----  Contact: Patient [phone]  Is this a refill or new RX:  New/ Was prescribed while admitten in Hospital    RX name and strength: sodium bicarbonate 650 MG tablet    Pharmacy name and phone #[facility] [id number] - [id number] [facility] [phone] (Phone) [phone] (Fax)    Comments:  This is not over the counter in the pill form per [facility].

## 2019-03-06 NOTE — PATIENT INSTRUCTIONS
Discharge Instructions for Heart Attack  You have had a heart attack (acute myocardial infarction). A heart attack occurs when a vessel that sends blood to your heart suddenly becomes blocked. This causes your heart not to work as well as it should. Follow these guidelines for home care and lifestyle changes.  Home care  · Take your medicines exactly as directed. Dont skip doses. Talk with your healthcare provider if your medicines aren't working for you. Together you can come up with another treatment plan.  · Remember that recovery after a heart attack takes time. Plan to rest for at least 4 to 8 weeks while you recover. Then return to normal activity when your doctor says its OK.  · Ask your doctor about joining a heart rehabilitation program. This can help strengthen your heart and lungs and give you more energy and confidence.  · Tell your doctor if you are feeling depressed. Feelings of sadness are common after a heart attack. But it is important to speak to someone or seek counseling if you are feeling overwhelmed by these feelings.  · Call 911 right away if you have chest pain or pain that goes to your shoulder, neck, or back. Don't drive yourself to the hospital.  · Ask your family members to learn CPR. This is an important skill that can save lives when it's needed.  · Learn to take your own blood pressure and pulse. Keep a record of your results. Ask your doctor when you should seek emergency medical attention. He or she will tell you which blood pressure reading is dangerous.  Lifestyle changes  Your heart attack might have been caused by cardiovascular disease. Your healthcare provider will work with you to make changes to your lifestyle. This will help the heart disease from getting worse. These changes will most likely be a combination of diet and exercise.  Diet  Your healthcare provider will tell you what changes you need to make to your diet. You may need to see a registered dietitian for help  with these diet changes. These changes may include:  · Cutting back on how much fat and cholesterol you eat  · Cutting back on how much salt (sodium) you eat, especially if you have high blood pressure  · Eating more fresh vegetables and fruits  · Eating lean proteins such as fish, poultry, beans, and peas, and eating less red meat and processed meats  · Using low-fat dairy products  · Using vegetable and nut oils in limited amounts  · Limiting how many sweets and processed foods such as chips, cookies, and baked goods you eat  · Limiting how often you eat out. And when you do eat out, making better food choices.  · Not eating fried or greasy foods, or foods high in saturated fat  Exercise  Your healthcare provider may tell you to get more exercise if you haven't been physically active. Depending on your case, your provider may recommend that you get moderate to vigorous physical activity for at least 40 minutes each day, and for at least 3 to 4 days each week. A few examples of moderate to vigorous activity include:  · Walking at a brisk pace, about 3 to 4 miles per hour  · Jogging or running  · Swimming or water aerobics  · Hiking  · Dancing  · Martial arts  · Tennis  · Riding a bicycle or stationary bike  Other changes  Your healthcare provider may also recommend that you:  · Lose weight. If you are overweight or obese, your provider will work with you to lose extra pounds. Making diet changes and getting more exercise can help. A good goal is to lose your 10% of your body weight in one year.  · Stop smoking. Sign up for a stop-smoking program to make it more likely for you to quit for good. You can join a stop-smoking support group. Or ask your doctor about nicotine replacement products.  · Learn to manage stress. Stress management techniques to help you deal with stress in your home and work life. This will help you feel better emotionally and ease the strain on your heart.  Follow-up  Make a follow-up  appointment as directed by our staff.     When to seek medical advice  Call 911 right away if you have:  · Chest pain that goes to your neck, jaw, back, or shoulder  · Shortness of breath  Otherwise, call your doctor immediately if you have:  · Lightheadedness, dizziness, or fainting  · Feeling of irregular heartbeat or fast pulse.   Date Last Reviewed: 10/1/2016  © 3524-8638 [employer]. [address] [place] [address], [place] [address]. All rights reserved. This information is not intended as a substitute for professional medical care. Always follow your healthcare professional's instructions.

## 2019-03-11 ENCOUNTER — OFFICE VISIT (OUTPATIENT)
Dept: CARDIOLOGY | Facility: CLINIC | Age: 64
End: 2019-03-11
Payer: MEDICARE

## 2019-03-11 VITALS
DIASTOLIC BLOOD PRESSURE: 71 MMHG | WEIGHT: 172 LBS | SYSTOLIC BLOOD PRESSURE: 136 MMHG | BODY MASS INDEX: 25.48 KG/M2 | HEIGHT: 69 IN | HEART RATE: 89 BPM | OXYGEN SATURATION: 98 %

## 2019-03-11 DIAGNOSIS — I21.4 NSTEMI (NON-ST ELEVATED MYOCARDIAL INFARCTION): ICD-10-CM

## 2019-03-11 DIAGNOSIS — E87.20 METABOLIC ACIDOSIS: ICD-10-CM

## 2019-03-11 DIAGNOSIS — Z98.890 S/P CAROTID ENDARTERECTOMY: ICD-10-CM

## 2019-03-11 DIAGNOSIS — F17.200 SMOKER: Chronic | ICD-10-CM

## 2019-03-11 DIAGNOSIS — M62.82 NON-TRAUMATIC RHABDOMYOLYSIS: ICD-10-CM

## 2019-03-11 DIAGNOSIS — I10 ESSENTIAL HYPERTENSION: Chronic | ICD-10-CM

## 2019-03-11 DIAGNOSIS — I25.10 CORONARY ARTERY DISEASE INVOLVING NATIVE CORONARY ARTERY OF NATIVE HEART WITHOUT ANGINA PECTORIS: Primary | ICD-10-CM

## 2019-03-11 DIAGNOSIS — Z95.1 S/P CABG X 1: ICD-10-CM

## 2019-03-11 DIAGNOSIS — E78.2 MIXED HYPERLIPIDEMIA: Chronic | ICD-10-CM

## 2019-03-11 DIAGNOSIS — I70.229 ATHEROSCLEROTIC PERIPHERAL VASCULAR DISEASE WITH REST PAIN: ICD-10-CM

## 2019-03-11 PROCEDURE — 99215 OFFICE O/P EST HI 40 MIN: CPT | Mod: S$GLB,,, | Performed by: INTERNAL MEDICINE

## 2019-03-11 PROCEDURE — 99999 PR PBB SHADOW E&M-EST. PATIENT-LVL III: CPT | Mod: PBBFAC,,, | Performed by: INTERNAL MEDICINE

## 2019-03-11 PROCEDURE — 3075F PR MOST RECENT SYSTOLIC BLOOD PRESS GE 130-139MM HG: ICD-10-PCS | Mod: CPTII,S$GLB,, | Performed by: INTERNAL MEDICINE

## 2019-03-11 PROCEDURE — 3078F DIAST BP <80 MM HG: CPT | Mod: CPTII,S$GLB,, | Performed by: INTERNAL MEDICINE

## 2019-03-11 PROCEDURE — 3008F BODY MASS INDEX DOCD: CPT | Mod: CPTII,S$GLB,, | Performed by: INTERNAL MEDICINE

## 2019-03-11 PROCEDURE — 99999 PR PBB SHADOW E&M-EST. PATIENT-LVL III: ICD-10-PCS | Mod: PBBFAC,,, | Performed by: INTERNAL MEDICINE

## 2019-03-11 PROCEDURE — 3075F SYST BP GE 130 - 139MM HG: CPT | Mod: CPTII,S$GLB,, | Performed by: INTERNAL MEDICINE

## 2019-03-11 PROCEDURE — 3008F PR BODY MASS INDEX (BMI) DOCUMENTED: ICD-10-PCS | Mod: CPTII,S$GLB,, | Performed by: INTERNAL MEDICINE

## 2019-03-11 PROCEDURE — 3078F PR MOST RECENT DIASTOLIC BLOOD PRESSURE < 80 MM HG: ICD-10-PCS | Mod: CPTII,S$GLB,, | Performed by: INTERNAL MEDICINE

## 2019-03-11 PROCEDURE — 99215 PR OFFICE/OUTPT VISIT, EST, LEVL V, 40-54 MIN: ICD-10-PCS | Mod: S$GLB,,, | Performed by: INTERNAL MEDICINE

## 2019-03-11 RX ORDER — CLOPIDOGREL BISULFATE 75 MG/1
TABLET ORAL
Qty: 90 TABLET | Refills: 3 | Status: SHIPPED | OUTPATIENT
Start: 2019-03-11 | End: 2020-10-29 | Stop reason: SDUPTHER

## 2019-03-11 RX ORDER — AMLODIPINE BESYLATE 10 MG/1
TABLET ORAL
Qty: 90 TABLET | Refills: 3 | Status: SHIPPED | OUTPATIENT
Start: 2019-03-11 | End: 2020-10-29 | Stop reason: SDUPTHER

## 2019-03-11 RX ORDER — ATORVASTATIN CALCIUM 80 MG/1
80 TABLET, FILM COATED ORAL DAILY
Qty: 90 TABLET | Refills: 3 | Status: SHIPPED | OUTPATIENT
Start: 2019-03-11 | End: 2020-09-17

## 2019-03-11 RX ORDER — CARVEDILOL 25 MG/1
TABLET ORAL
Qty: 180 TABLET | Refills: 3 | Status: SHIPPED | OUTPATIENT
Start: 2019-03-11 | End: 2020-10-29 | Stop reason: SDUPTHER

## 2019-03-11 NOTE — LETTER
March 11, 2019      Ranjit Bob MD  [address]  [facility] [address]           [facility]  [address] [facility]   [place] [address]  Phone: [phone]  Fax: [phone]          Patient: Emanuel Daniel   MR Number: [id number]   YOB: 1955   Date of Visit: 3/11/2019       Dear Dr. Ranjit Bob:    Thank you for referring Emanuel Daniel to me for evaluation. Attached you will find relevant portions of my assessment and plan of care.    If you have questions, please do not hesitate to call me. I look forward to following Emanuel Daniel along with you.    Sincerely,    Avila Ernandez MD PhD    Enclosure  CC:  No Recipients    If you would like to receive this communication electronically, please contact [email or web address] or [phone] to request more information on [employer] Link access.    For providers and/or their staff who would like to refer a patient to [facility], please contact us through our one-stop-shop provider referral line, [facility], at [phone].    If you feel you have received this communication in error or would no longer like to receive these types of communications, please e-mail [email or web address]

## 2019-03-11 NOTE — PROGRESS NOTES
[facility]      Chief Complaint   Patient presents with    Hospital Follow Up     NSTEMI       Patient ID: Emanuel Daniel is a 63 y.o. male with a past medical history of CAD s/p 1V CABG (LIMA-LAD) s/p PCI to ostial LM (Mr 3.82mfn7uz) with flash ballooning and PCI to LCX/OM1 (2.91wgh48un, 2.94bfy70yc, 2.98kqk59kh) 2013, PAD s/p REIA stent ([id number]), HTN, HLD, smoking, alcohol abuse, who presents for follow up after hospital discharge with a NSTEMI, MICHAEL on CKD4, and metabolic encephalopathy likely due to uremia.  Pertinent history/events are as follows:     -On 3/1/2019, pt was admitted to [facility] with metabolic encephalopathy, MICHAEL and a NSTEMI (details as per discharge summary below):  He was in his usual state of health until yesterday, when he found asleep by his friend and was confused to date/time. His friend thought he was sleepy from a pain medication or too much alcohol, so he let him sleep it off. When Mr. Daniel was persistently confused in the morning and minimally communicative, the friend brought him to the ED. His mental status cleared somewhat to the extent that he very clearly denies any chest pain, shortness of breath, palpitations, syncope, and edema. He agrees that his PO intake has been decreased lately but cannot provide any additional information     On arrival here he is hemodynamically stable though with some low normal BP measurements. Lab workup significant for MICHAEL with creatinine 5.9, BUN 78, hyperkalemia with K 5.3, NAGMA with bicarb 18, and hyperphosphatemia at 6.8. His troponin was elevated at 14, and his CPK at 1194. ECG unchanged from prior with NSTWA. Cardiology was consulted, who recommended medical therapy for NSTEMI given his lack of symptoms and worsening renal function     Hospital Course:   Mr. Daniel presented with NSTEMI, MICHAEL on CKD4, and metabolic encephalopathy likely due to uremia. He was managed medically for NSTEMI with 48hr heparin gtt,  "plavix, ASA, coreg, and atorvastatin. Troponin peak at 39 without any recurrence of chest pain or discomfort. He was initially bolused 2L NS in ED, then further rehydrated with a slow drip with improvement in renal function to baseline. Mentation improved as well during this time. He had initially been hypotensive on presentation and home clonidine, HCTZ, ACE, and amlodipine were held. No angiography performed while in house due to concerns for precipitation of further renal failure. He will be discharged on medical therapy and have outpatient f/u with Interventional Cardiology and Nephrology with 1 week BMP.    HPI:  Mr. Daniel reports feeling much better since hospital discharge.  He has no chest pain or SOB.  State he "gets depressed a lot".  Smoking a pack a day for 45 years.  Pt scheduled to follow up with Nephrology on 3/11/2019.  Blood pressure today is 136/71.      Past Medical History:   Diagnosis Date    Alcohol abuse     Angina of effort     Arthritis     CAD (coronary artery disease)     Carotid artery stenosis, symptomatic 12/30/2014    Coronary artery disease     Gout     Hernia     HTN (hypertension)     Hyperlipidemia     Myocardial infarct, old     Smoker active     Past Surgical History:   Procedure Laterality Date    ANGIOGRAM-EXTREMITY-LOWER Bilateral 3/31/2016    Performed by [facility]ong III, [facility] OR 2ND FLR    ANGIOGRAM-RENAL Bilateral 7/30/2014    Performed by In[facility]encio Gotti [facility] at [facility] CATH LAB    ANGIOPLASTY      ANGIOPLASTY WITH STENT PLACEMENT Bilateral 3/31/2016    Performed by GINA [facility]ong III, [facility] at [facility] OR 2ND FLR    AORTOGRAM Bilateral 3/31/2016    [facility]    ARTERIOGRAM-LEG AND ULTRASOUND Right 1/2/2019    Performed by GINA [facility]ong III, [facility] at [facility] OR 2ND FLR    CARDIAC CATHETERIZATION      CARDIAC CATHETERIZATION      CATHETERIZATION, HEART, LEFT N/A 8/30/2013    Performed by Inocencio Gotti MD " at [facility] CATH LAB    CORONARY ANGIOPLASTY      CORONARY ARTERY BYPASS GRAFT      CORONARY ARTERY BYPASS GRAFT (CABG) N/A 8/26/2013    Performed by Pj Duff MD at [facility] OR 2ND FLR    ENDARTERECTOMY-CAROTID Right 1/5/2015    [facility]    HEMORRHOID SURGERY      INSERTION, STENT, CORONARY ARTERY N/A 8/7/2013    Performed by Inocencio Gotti MD at [facility] CATH LAB    PTA (ANGIOPLASTY, PERCUTANEOUS, TRANSLUMINAL) N/A 1/2/2019    Performed by GINA Vuong III, MD at [facility] OR 2ND FLR    TONSILLECTOMY       Social History     Socioeconomic History    Marital status: Single     Spouse name: Not on file    Number of children: Not on file    Years of education: Not on file    Highest education level: Not on file   Social Needs    Financial resource strain: Not on file    Food insecurity - worry: Not on file    Food insecurity - inability: Not on file    Transportation needs - medical: Not on file    Transportation needs - non-medical: Not on file   Occupational History    Not on file   Tobacco Use    Smoking status: Current Every Day Smoker     Packs/day: 1.00    Smokeless tobacco: Former User   Substance and Sexual Activity    Alcohol use: Yes     Alcohol/week: 100.8 oz     Types: 168 Cans of beer per week     Comment: 3-4 cans of beer a day    Drug use: No    Sexual activity: Not on file   Other Topics Concern    Not on file   Social History Narrative    Not on file     Family History   Problem Relation Age of Onset    Hypertension Father     Heart attack Father     Heart disease Father     Heart failure Father     Hypertension Sister     Clotting disorder Sister     Stroke Sister     Hypertension Paternal Grandfather     No Known Problems Mother     No Known Problems Brother     No Known Problems Maternal Grandmother     No Known Problems Maternal Grandfather     No Known Problems Paternal Grandmother     No Known Problems Maternal Aunt  "    No Known Problems Maternal Uncle     No Known Problems Paternal Aunt     No Known Problems Paternal Uncle     Anemia Neg Hx     Arrhythmia Neg Hx     Asthma Neg Hx     Fainting Neg Hx     Hyperlipidemia Neg Hx     Atrial Septal Defect Neg Hx        Review of patient's allergies indicates:   Allergen Reactions    Iodine and iodide containing products      Anaphylactic rxn        Medication List with Changes/Refills   Current Medications    ALLOPURINOL (ZYLOPRIM) 100 MG TABLET    Take 100 mg by mouth once daily.     AMLODIPINE (NORVASC) 10 MG TABLET    amlodipine 10 mg tablet q HS    ASPIRIN (ECOTRIN) 81 MG EC TABLET    Take 81 mg by mouth once daily.    ATORVASTATIN (LIPITOR) 80 MG TABLET    Take 1 tablet (80 mg total) by mouth once daily.    CARVEDILOL (COREG) 25 MG TABLET    carvedilol 25 mg tablet TWO TIMES A DAY    CLOPIDOGREL (PLAVIX) 75 MG TABLET    clopidogrel 75 mg tablet daily    LORAZEPAM (ATIVAN) 0.5 MG TABLET    Ativan 0.5 mg tablet   Take 1 tablet by oral route at bedtime.    NITROGLYCERIN (NITROSTAT) 0.4 MG SL TABLET    nitroglycerin 0.4 mg sublingual tablet    SODIUM BICARBONATE 650 MG TABLET    Take 1 tablet (650 mg total) by mouth 2 (two) times daily.       Review of Systems  Constitution: Denies chills, fever, and sweats.  HENT: Denies headaches or blurry vision.  Cardiovascular: Denies chest pain or irregular heart beat.  Respiratory: Denies cough or shortness of breath.  Gastrointestinal: Denies abdominal pain, nausea, or vomiting.  Musculoskeletal: Denies muscle cramps.  Neurological: Denies dizziness or focal weakness.  Psychiatric/Behavioral: Normal mental status.  Hematologic/Lymphatic: Denies bleeding problem or easy bruising/bleeding.  Skin: Denies rash or suspicious lesions    Physical Examination  /71 (BP Location: Left arm, Patient Position: Sitting, BP Method: Large (Automatic))   Pulse 89   Ht 5' 9" (1.753 m)   Wt 78 kg (172 lb)   SpO2 98%   BMI 25.40 kg/m² "     Constitutional: No acute distress, conversant  HEENT: Sclera anicteric, Pupils equal, round and reactive to light, extraocular motions intact, Oropharynx clear  Neck: No JVD, no carotid bruits  Cardiovascular: regular rate and rhythm, no murmur, rubs or gallops, normal S1/S2  Pulmonary: Clear to auscultation bilaterally  Abdominal: Abdomen soft, nontender, nondistended, positive bowel sounds  Extremities: No lower extremity edema,   Pulses:  Carotid pulses are 2+ on the right side, and 2+ on the left side.  Radial pulses are 2+ on the right side, and 2+ on the left side.   Femoral pulses are 2+ on the right side, and 2+ on the left side.  Popliteal pulses are 2+ on the right side, and 2+ on the left side.   Dorsalis pedis pulses are 2+ on the right side, and 2+ on the left side.   Posterior tibial pulses are 2+ on the right side, and 2+ on the left side.    Skin: No ecchymosis, erythema, or ulcers  Psych: Alert and oriented x 3, appropriate affect  Neuro: CNII-XII intact, no focal deficits    Labs:  Most Recent Data  CBC:   Lab Results   Component Value Date    WBC 4.74 03/04/2019    HGB 11.4 (L) 03/04/2019    HCT 35.0 (L) 03/04/2019     (L) 03/04/2019    MCV 85 03/04/2019    RDW 15.5 (H) 03/04/2019     BMP:   Lab Results   Component Value Date     03/04/2019    K 4.0 03/04/2019     (H) 03/04/2019    CO2 21 (L) 03/04/2019    BUN 41 (H) 03/04/2019    CREATININE 2.3 (H) 03/04/2019    GLU 99 03/04/2019    CALCIUM 9.0 03/04/2019    MG 1.5 (L) 03/04/2019    PHOS 2.6 (L) 03/04/2019     LFTS;   Lab Results   Component Value Date    PROT 6.1 03/04/2019    ALBUMIN 3.0 (L) 03/04/2019    BILITOT 0.4 03/04/2019    AST 17 03/04/2019    ALKPHOS 88 03/04/2019    ALT 9 (L) 03/04/2019     COAGS:   Lab Results   Component Value Date    INR 1.0 03/01/2019     FLP:   Lab Results   Component Value Date    CHOL 226 (H) 11/08/2017    HDL 23 (L) 11/08/2017    LDLCALC Invalid, Trig>400.0 11/08/2017    TRIG 691 (H)  11/08/2017    CHOLHDL 10.2 (L) 11/08/2017     CARDIAC:   Lab Results   Component Value Date    TROPONINI 21.869 (H) 03/03/2019     (H) 03/01/2019       EKG 3/3/2019:  Normal sinus rhythm with sinus arrhythmia  ST and T wave abnormality, consider lateral ischemia    Echo 3/2/2019:  · Concentric left ventricular hypertrophy.  · Normal left ventricular systolic function. The estimated ejection fraction is 60%  · Normal LV diastolic function.  · Mild-to-moderate mitral regurgitation.  · Normal central venous pressure (3 mm Hg).      Assessment/Plan:  Emanuel Daniel is a 63 y.o. male with a past medical history of CAD s/p 1V CABG (LIMA-LAD) s/p PCI to ostial LM (Mr 3.73edb1hp) with flash ballooning and PCI to LCX/OM1 (2.65olw57ha, 2.42szy37qe, 2.65pgy12kb) 2013, PAD s/p REIA stent ([id number]), HTN, HLD, smoking, alcohol abuse, who presents for follow up after hospital discharge with a NSTEMI, MICHAEL on CKD4, and metabolic encephalopathy likely due to uremia.     1. CAD s/p 1V CABG (LIMA-LAD) s/p PCI to ostial LM (Mr 3.39ofq6ar) with flash ballooning and PCI to LCX/OM1 (2.09wdp13xb, 2.68ynl98xa, 2.77xgz76kh) 2013- Mr. Daniel has no angina at this time.  Echo from 3/2/2019 showed concentric left ventricular hypertrophy; normal left ventricular systolic function. The estimated ejection fraction is 60%; normal LV diastolic function; mild-to-moderate mitral regurgitation.  Continue medical management of CAD with plavix, ASA, coreg, and atorvastatin. Pt has upcoming follow up with Nephrology.  Monitor renal fxn.  If pt becomes symptomatic with angina, will check PET stress test.      2. HTN- Controlled.  Continue current medication regimen.    3. PAD- Stable. Continue ASA, plavix, statin.      4. Smoking- Continue to encourage smoking cessation.    Follow up in 3 months    Total duration of face to face visit time 30 minutes.  Total time spent counseling greater than fifty percent of total visit time.  Counseling included  discussion regarding imaging findings, diagnosis, possibilities, treatment options, risks and benefits.  The patient had many questions regarding the options and long-term effects.    Avila Ernandez MD, PhD  Interventional Cardiology

## 2019-03-11 NOTE — PATIENT INSTRUCTIONS
Assessment/Plan:  Emanuel Daniel is a 63 y.o. male with a past medical history of CAD s/p 1V CABG (LIMA-LAD) s/p PCI to ostial LM (Mr 3.42qrf9lw) with flash ballooning and PCI to LCX/OM1 (2.43bmv68ti, 2.26psa91zl, 2.37byi71ge) 2013, PAD s/p REIA stent ([id number]), HTN, HLD, smoking, alcohol abuse, who presents for follow up after hospital discharge with a NSTEMI, MICHAEL on CKD4, and metabolic encephalopathy likely due to uremia.     1. CAD s/p 1V CABG (LIMA-LAD) s/p PCI to ostial LM (Mr 3.33syh6th) with flash ballooning and PCI to LCX/OM1 (2.34esz14gr, 2.57ubi00xr, 2.37mnq84tn) 2013- Mr. Daniel has no angina at this time.  Echo from 3/2/2019 showed concentric left ventricular hypertrophy; normal left ventricular systolic function. The estimated ejection fraction is 60%; normal LV diastolic function; mild-to-moderate mitral regurgitation.  Continue medical management of CAD with plavix, ASA, coreg, and atorvastatin. Pt has upcoming follow up with Nephrology.  Monitor renal fxn.  If pt becomes symptomatic with angina, will check PET stress test.      2. HTN- Controlled.  Continue current medication regimen.    3. PAD- Stable. Continue ASA, plavix, statin.      4. Smoking- Continue to encourage smoking cessation.    Follow up in 3 months

## 2019-04-26 NOTE — PLAN OF CARE
Problem: Adult Inpatient Plan of Care  Goal: Plan of Care Review  Outcome: Outcome(s) achieved Date Met: 03/04/19  Patient remained in stable condition through shift. IV's removed, catheter tip intact. Discharge instructions given to patient. Educated patient on where to  medications, upcoming appointments and location. All questions answered. Will request wheelchair.         Detail Level: Detailed

## 2019-05-02 ENCOUNTER — OFFICE VISIT (OUTPATIENT)
Dept: URGENT CARE | Facility: CLINIC | Age: 64
End: 2019-05-02
Payer: MEDICARE

## 2019-05-02 VITALS
SYSTOLIC BLOOD PRESSURE: 146 MMHG | HEART RATE: 75 BPM | HEIGHT: 69 IN | OXYGEN SATURATION: 100 % | DIASTOLIC BLOOD PRESSURE: 72 MMHG | TEMPERATURE: 98 F | RESPIRATION RATE: 18 BRPM | WEIGHT: 175 LBS | BODY MASS INDEX: 25.92 KG/M2

## 2019-05-02 DIAGNOSIS — H61.20 IMPACTED CERUMEN, UNSPECIFIED LATERALITY: Primary | ICD-10-CM

## 2019-05-02 PROCEDURE — 3078F DIAST BP <80 MM HG: CPT | Mod: CPTII,S$GLB,, | Performed by: NURSE PRACTITIONER

## 2019-05-02 PROCEDURE — 3077F SYST BP >= 140 MM HG: CPT | Mod: CPTII,S$GLB,, | Performed by: NURSE PRACTITIONER

## 2019-05-02 PROCEDURE — 99203 OFFICE O/P NEW LOW 30 MIN: CPT | Mod: S$GLB,,, | Performed by: NURSE PRACTITIONER

## 2019-05-02 PROCEDURE — 3078F PR MOST RECENT DIASTOLIC BLOOD PRESSURE < 80 MM HG: ICD-10-PCS | Mod: CPTII,S$GLB,, | Performed by: NURSE PRACTITIONER

## 2019-05-02 PROCEDURE — 3008F PR BODY MASS INDEX (BMI) DOCUMENTED: ICD-10-PCS | Mod: CPTII,S$GLB,, | Performed by: NURSE PRACTITIONER

## 2019-05-02 PROCEDURE — 99203 PR OFFICE/OUTPT VISIT, NEW, LEVL III, 30-44 MIN: ICD-10-PCS | Mod: S$GLB,,, | Performed by: NURSE PRACTITIONER

## 2019-05-02 PROCEDURE — 3077F PR MOST RECENT SYSTOLIC BLOOD PRESSURE >= 140 MM HG: ICD-10-PCS | Mod: CPTII,S$GLB,, | Performed by: NURSE PRACTITIONER

## 2019-05-02 PROCEDURE — 3008F BODY MASS INDEX DOCD: CPT | Mod: CPTII,S$GLB,, | Performed by: NURSE PRACTITIONER

## 2019-05-02 RX ORDER — METHYLPREDNISOLONE 4 MG/1
TABLET ORAL
COMMUNITY
End: 2020-10-29

## 2019-05-02 RX ORDER — COLCHICINE 0.6 MG/1
TABLET ORAL
Status: ON HOLD | COMMUNITY
Start: 2019-03-13 | End: 2020-12-15 | Stop reason: HOSPADM

## 2019-05-02 NOTE — PROGRESS NOTES
"Subjective:       Patient ID: Emanuel Daniel is a 64 y.o. male.    Vitals:  height is 5' 9" (1.753 m) and weight is 79.4 kg (175 lb). His oral temperature is 98.1 °F (36.7 °C). His blood pressure is 146/72 (abnormal) and his pulse is 75. His respiration is 18 and oxygen saturation is 100%.     Chief Complaint: Otalgia (right ear)    Otalgia    There is pain in the right ear. This is a new problem. Episode onset: 1 month. The problem occurs constantly. The problem has been gradually worsening. There has been no fever. The pain is at a severity of 3/10. The pain is mild. Associated symptoms include hearing loss. Pertinent negatives include no coughing, diarrhea, headaches, rash, sore throat or vomiting. He has tried nothing for the symptoms.       Constitution: Negative for chills, fatigue and fever.   HENT: Positive for ear pain and hearing loss. Negative for congestion and sore throat.    Neck: Negative for painful lymph nodes.   Cardiovascular: Negative for chest pain and leg swelling.   Eyes: Negative for double vision and blurred vision.   Respiratory: Negative for cough and shortness of breath.    Gastrointestinal: Negative for nausea, vomiting and diarrhea.   Genitourinary: Negative for dysuria, frequency and urgency.   Musculoskeletal: Negative for joint pain, joint swelling, muscle cramps and muscle ache.   Skin: Negative for color change, pale and rash.   Allergic/Immunologic: Negative for seasonal allergies.   Neurological: Negative for dizziness, history of vertigo, light-headedness, passing out and headaches.   Hematologic/Lymphatic: Negative for swollen lymph nodes, easy bruising/bleeding and history of blood clots. Does not bruise/bleed easily.   Psychiatric/Behavioral: Negative for nervous/anxious, sleep disturbance and depression. The patient is not nervous/anxious.        Objective:      Physical Exam   Constitutional: He is oriented to person, place, and time. Vital signs are normal. He appears " well-developed and well-nourished. He is active and cooperative.  Non-toxic appearance. He does not have a sickly appearance. He does not appear ill. No distress.   HENT:   Head: Normocephalic and atraumatic.   Right Ear: External ear normal. Decreased hearing (mildly) is noted.   Left Ear: External ear normal. Decreased hearing (mildly) is noted.   Nose: No mucosal edema, rhinorrhea or nasal deformity. No epistaxis. Right sinus exhibits no maxillary sinus tenderness and no frontal sinus tenderness. Left sinus exhibits no maxillary sinus tenderness and no frontal sinus tenderness.   Mouth/Throat: Uvula is midline and mucous membranes are normal. No trismus in the jaw. Normal dentition. No uvula swelling. No posterior oropharyngeal edema or posterior oropharyngeal erythema. No tonsillar exudate.   Cerumen impacted bilaterally. Unable to visualized ear canals or TMs.    Eyes: Pupils are equal, round, and reactive to light. Conjunctivae, EOM and lids are normal. No scleral icterus.   Sclera clear bilat   Neck: Trachea normal, full passive range of motion without pain and phonation normal. Neck supple.   Cardiovascular: Normal rate, regular rhythm, normal heart sounds and intact distal pulses.   Pulses:       Radial pulses are 2+ on the right side, and 2+ on the left side.   Pulmonary/Chest: Effort normal and breath sounds normal. No respiratory distress.   Musculoskeletal: Normal range of motion. He exhibits no edema or deformity.   Neurological: He is alert and oriented to person, place, and time. He has normal strength. Gait normal.   Skin: Skin is warm, dry and intact. Capillary refill takes less than 2 seconds. No rash noted. He is not diaphoretic. No pallor.   Psychiatric: He has a normal mood and affect. His speech is normal and behavior is normal. Judgment and thought content normal. Cognition and memory are normal.   Nursing note and vitals reviewed.      Assessment:       1. Impacted cerumen, unspecified  laterality        Plan:       Post irrigation clear ear canals and TMs bilaterally. Patient able to hear better. Will f/u with PCP for further evaluation.     Impacted cerumen, unspecified laterality      Patient Instructions   If you were prescribed a narcotic or controlled medication, do not drive or operate heavy equipment or machinery while taking these medications.  You must understand that you've received an Urgent Care treatment only and that you may be released before all your medical problems are known or treated. You, the patient, will arrange for follow up care as instructed.  Follow up with your PCP or specialty clinic as directed within 2-5 days if not improved or as needed.  You can call [phone] to schedule an appointment with the appropriate provider.  If your condition worsens we recommend that you receive another evaluation at the emergency room immediately or contact your primary medical clinics after hours call service to discuss your concerns.  Please return here or go to the Emergency Department for any concerns or worsening of condition.      Earwax (Treated)    Everyone produces earwax from the lining of the ear canal. It lubricates and protects the ear. The wax that forms in the canal slowly moves toward the outside of the ear and falls out. Sometimes wax can build up in the ear canal. This can cause a blockage and loss of hearing. A buildup of earwax was removed from your ear today.  Home care  If you have a tendency to build up wax in the ear canal, you should clear the wax at home regularly, before it causes discomfort. This should be about once every six months.  · Unless a medicine was prescribed, you may use an over-the-counter product made for clearing earwax. These contain carbamide peroxide and are available over-the-counter in a kit with a small bulb syringe.  · Lie down with the blocked ear facing upward. Apply one dropper full of medicine and wait a few minutes. Grasp the  outer ear and wiggle it to help the solution enter the canal.  · Lean over a sink or basin with the blocked ear turned downward. Use a rubber bulb syringe filled with warm (not hot or cold) water to rinse the ear several times. Use gentle pressure only. You may need to repeat the irrigation several times before the wax flows out.  · If you are having trouble draining all the water out of your ear canal, put a few drops of rubbing alcohol into the ear canal. This will help remove the remaining water.  Don'ts  · Dont use cold water to rinse the ear. This will make you dizzy.  · Dont do this procedure if you have an ear infection. Symptoms include ear pain, fever, or fluid draining from the ear.  · Dont do this procedure if you have a punctured eardrum.  · Dont use cotton swabs, matches, hairpins, keys, or other objects to clean the ear canal. This can cause infection of the ear canal or rupture of the eardrum. Because of their size and shape, cotton swabs can push the earwax deeper into the ear canal instead of removing it.  Follow-up care  Follow up with your healthcare provider, or as advised.  When to seek medical advice  Call your healthcare provider right away if any of these occur:  · Worsening ear pain  · Fever of 100.4°F (38°C) or higher, or as directed by your healthcare provider  · Hearing does not return to normal after three days of treatment  · Fluid drainage or bleeding from the ear canal  · Swelling, redness, or tenderness of the outer ear  · Headache, neck pain, or stiff neck  Date Last Reviewed: 3/22/2015  © 4130-6213 [employer], [employer]. [address] [place] [address], [place] [address]. All rights reserved. This information is not intended as a substitute for professional medical care. Always follow your healthcare professional's instructions.        Earwax Removal    The ear canal makes earwax from the canals lining. The ears make wax to lubricate and protect the ear canal. The ear canal is  the tube that connects the middle ear to the outside of the ear. The wax protects the ear from bacteria, infection, and damage from water or trauma.  The wax that forms in the canal naturally moves toward the outside of the ear and falls out. In some cases, the ear may make too much wax. If the wax causes problems or keeps the healthcare provider from seeing into the ear, the extra wax may be removed.  Too much wax can affect your hearing. It can cause itching. In rare cases, it can be painful. Earwax should not be removed unless it is causing a problem. You should not stick objects into your ear to remove wax unless told to do so by your healthcare provider.  Healthcare providers can remove earwax safely. It is important to stay still during the procedure to avoid damage to the ear canal. But removing earwax generally doesnt hurt. You will not usually need anesthesia or pain medicine when the provider removes the earwax.  A number of conditions lead to earwax buildup. These include some skin problems, a narrow ear canal, or ears that make too much earwax. Using cotton swabs in the canal pushes earwax deeper into the ear and contributes to the buildup of earwax.  Home care  · The healthcare provider may recommend mineral oil or an over-the-counter eardrop to use at home to soften the earwax. Use these products only if the provider recommends them. Use these products only if the provider recommends them. Carefully follow the instructions given.  · Dont use mineral oil or OTC eardrops if you might have an ear infection or a ruptured eardrum. Tell your healthcare provider right away if you have diabetes or an immune disorder.  · Dont use cotton swabs in your ears. Cotton swabs may push wax deeper into the ear canal or damage the eardrum. Use cotton gauze or a wet washcloth  to gently remove wax on the outside of the ear and around the opening to the ear canal.  · Don't use any probing device or object such as  cotton-tipped swabs or sylvester pins to clean the inside of your ears.  · Dont use ear candles to clean your ears. Candling can be dangerous. It can burn the ear canal. It can also make the condition worse instead of better.  · Dont use cold water to rinse the ear. This will make you dizzy. If your provider tells you to rinse your ear, use only warm water or follow his or her instructions.  · Check the ear for signs of infection or irritation listed below under When to seek medical advice.  Steps for using eardrops  1. Warm the medicine bottle by rubbing it between your hands for a few minutes.  2. Lie down on your side, with the affected ear up.  3. Place the recommended number of drops in the ear. Wet a cotton ball with the medicine. Gently put the cotton ball into the ear opening.  Follow-up care  Follow up with your healthcare provider, or as directed.  When to seek medical advice  Call the provider right away if you have:  · Ear pain that gets worse  · Fever of 100.4F°F (38°C) or higher, or as directed by your healthcare provider  · Worsening wax buildup  · Severe pain, dizziness, or nausea  · Bleeding from the ear  · Hearing problems  · Signs of irritation from the eardrops, such as burning, stinging, or swelling and tenderness  · Foul-smelling fluid draining from the ear  · Swelling, redness, or tenderness of the outer ear  · Headache, neck pain, or stiff neck  Date Last Reviewed: 3/22/2015  © 6930-8603 [employer]. [address] [place] [address], [place] [address]. All rights reserved. This information is not intended as a substitute for professional medical care. Always follow your healthcare professional's instructions.

## 2019-05-02 NOTE — PATIENT INSTRUCTIONS
If you were prescribed a narcotic or controlled medication, do not drive or operate heavy equipment or machinery while taking these medications.  You must understand that you've received an Urgent Care treatment only and that you may be released before all your medical problems are known or treated. You, the patient, will arrange for follow up care as instructed.  Follow up with your PCP or specialty clinic as directed within 2-5 days if not improved or as needed.  You can call [phone] to schedule an appointment with the appropriate provider.  If your condition worsens we recommend that you receive another evaluation at the emergency room immediately or contact your primary medical clinics after hours call service to discuss your concerns.  Please return here or go to the Emergency Department for any concerns or worsening of condition.      Earwax (Treated)    Everyone produces earwax from the lining of the ear canal. It lubricates and protects the ear. The wax that forms in the canal slowly moves toward the outside of the ear and falls out. Sometimes wax can build up in the ear canal. This can cause a blockage and loss of hearing. A buildup of earwax was removed from your ear today.  Home care  If you have a tendency to build up wax in the ear canal, you should clear the wax at home regularly, before it causes discomfort. This should be about once every six months.  · Unless a medicine was prescribed, you may use an over-the-counter product made for clearing earwax. These contain carbamide peroxide and are available over-the-counter in a kit with a small bulb syringe.  · Lie down with the blocked ear facing upward. Apply one dropper full of medicine and wait a few minutes. Grasp the outer ear and wiggle it to help the solution enter the canal.  · Lean over a sink or basin with the blocked ear turned downward. Use a rubber bulb syringe filled with warm (not hot or cold) water to rinse the ear several times.  Use gentle pressure only. You may need to repeat the irrigation several times before the wax flows out.  · If you are having trouble draining all the water out of your ear canal, put a few drops of rubbing alcohol into the ear canal. This will help remove the remaining water.  Don'ts  · Dont use cold water to rinse the ear. This will make you dizzy.  · Dont do this procedure if you have an ear infection. Symptoms include ear pain, fever, or fluid draining from the ear.  · Dont do this procedure if you have a punctured eardrum.  · Dont use cotton swabs, matches, hairpins, keys, or other objects to clean the ear canal. This can cause infection of the ear canal or rupture of the eardrum. Because of their size and shape, cotton swabs can push the earwax deeper into the ear canal instead of removing it.  Follow-up care  Follow up with your healthcare provider, or as advised.  When to seek medical advice  Call your healthcare provider right away if any of these occur:  · Worsening ear pain  · Fever of 100.4°F (38°C) or higher, or as directed by your healthcare provider  · Hearing does not return to normal after three days of treatment  · Fluid drainage or bleeding from the ear canal  · Swelling, redness, or tenderness of the outer ear  · Headache, neck pain, or stiff neck  Date Last Reviewed: 3/22/2015  © 0301-2496 [employer]. [address] [place] [address]. All rights reserved. This information is not intended as a substitute for professional medical care. Always follow your healthcare professional's instructions.        Earwax Removal    The ear canal makes earwax from the canals lining. The ears make wax to lubricate and protect the ear canal. The ear canal is the tube that connects the middle ear to the outside of the ear. The wax protects the ear from bacteria, infection, and damage from water or trauma.  The wax that forms in the canal naturally moves toward the outside of the ear  and falls out. In some cases, the ear may make too much wax. If the wax causes problems or keeps the healthcare provider from seeing into the ear, the extra wax may be removed.  Too much wax can affect your hearing. It can cause itching. In rare cases, it can be painful. Earwax should not be removed unless it is causing a problem. You should not stick objects into your ear to remove wax unless told to do so by your healthcare provider.  Healthcare providers can remove earwax safely. It is important to stay still during the procedure to avoid damage to the ear canal. But removing earwax generally doesnt hurt. You will not usually need anesthesia or pain medicine when the provider removes the earwax.  A number of conditions lead to earwax buildup. These include some skin problems, a narrow ear canal, or ears that make too much earwax. Using cotton swabs in the canal pushes earwax deeper into the ear and contributes to the buildup of earwax.  Home care  · The healthcare provider may recommend mineral oil or an over-the-counter eardrop to use at home to soften the earwax. Use these products only if the provider recommends them. Use these products only if the provider recommends them. Carefully follow the instructions given.  · Dont use mineral oil or OTC eardrops if you might have an ear infection or a ruptured eardrum. Tell your healthcare provider right away if you have diabetes or an immune disorder.  · Dont use cotton swabs in your ears. Cotton swabs may push wax deeper into the ear canal or damage the eardrum. Use cotton gauze or a wet washcloth  to gently remove wax on the outside of the ear and around the opening to the ear canal.  · Don't use any probing device or object such as cotton-tipped swabs or sylvester pins to clean the inside of your ears.  · Dont use ear candles to clean your ears. Candling can be dangerous. It can burn the ear canal. It can also make the condition worse instead of better.  · Dont  use cold water to rinse the ear. This will make you dizzy. If your provider tells you to rinse your ear, use only warm water or follow his or her instructions.  · Check the ear for signs of infection or irritation listed below under When to seek medical advice.  Steps for using eardrops  1. Warm the medicine bottle by rubbing it between your hands for a few minutes.  2. Lie down on your side, with the affected ear up.  3. Place the recommended number of drops in the ear. Wet a cotton ball with the medicine. Gently put the cotton ball into the ear opening.  Follow-up care  Follow up with your healthcare provider, or as directed.  When to seek medical advice  Call the provider right away if you have:  · Ear pain that gets worse  · Fever of 100.4F°F (38°C) or higher, or as directed by your healthcare provider  · Worsening wax buildup  · Severe pain, dizziness, or nausea  · Bleeding from the ear  · Hearing problems  · Signs of irritation from the eardrops, such as burning, stinging, or swelling and tenderness  · Foul-smelling fluid draining from the ear  · Swelling, redness, or tenderness of the outer ear  · Headache, neck pain, or stiff neck  Date Last Reviewed: 3/22/2015  © 3294-7499 [employer]. [address] [place] [address], [place] [address]. All rights reserved. This information is not intended as a substitute for professional medical care. Always follow your healthcare professional's instructions.

## 2019-05-05 ENCOUNTER — TELEPHONE (OUTPATIENT)
Dept: URGENT CARE | Facility: CLINIC | Age: 64
End: 2019-05-05

## 2020-09-16 ENCOUNTER — TELEPHONE (OUTPATIENT)
Dept: CARDIOLOGY | Facility: CLINIC | Age: 65
End: 2020-09-16

## 2020-09-16 DIAGNOSIS — I10 HYPERTENSION, UNSPECIFIED TYPE: Primary | ICD-10-CM

## 2020-09-17 ENCOUNTER — HOSPITAL ENCOUNTER (OUTPATIENT)
Dept: CARDIOLOGY | Facility: CLINIC | Age: 65
Discharge: HOME | End: 2020-09-17
Payer: MEDICARE

## 2020-09-17 ENCOUNTER — OFFICE VISIT (OUTPATIENT)
Dept: CARDIOLOGY | Facility: CLINIC | Age: 65
End: 2020-09-17
Payer: MEDICARE

## 2020-09-17 VITALS
WEIGHT: 199.31 LBS | SYSTOLIC BLOOD PRESSURE: 193 MMHG | HEIGHT: 70 IN | DIASTOLIC BLOOD PRESSURE: 84 MMHG | HEART RATE: 59 BPM | BODY MASS INDEX: 28.53 KG/M2

## 2020-09-17 DIAGNOSIS — N18.4 CKD (CHRONIC KIDNEY DISEASE) STAGE 4, GFR 15-29 ML/MIN: ICD-10-CM

## 2020-09-17 DIAGNOSIS — I10 ESSENTIAL HYPERTENSION: Chronic | ICD-10-CM

## 2020-09-17 DIAGNOSIS — I73.9 PVD (PERIPHERAL VASCULAR DISEASE): Chronic | ICD-10-CM

## 2020-09-17 DIAGNOSIS — E78.2 MIXED HYPERLIPIDEMIA: Chronic | ICD-10-CM

## 2020-09-17 DIAGNOSIS — I25.10 CORONARY ARTERY DISEASE, ANGINA PRESENCE UNSPECIFIED, UNSPECIFIED VESSEL OR LESION TYPE, UNSPECIFIED WHETHER NATIVE OR TRANSPLANTED HEART: Primary | ICD-10-CM

## 2020-09-17 DIAGNOSIS — F17.200 SMOKER: Chronic | ICD-10-CM

## 2020-09-17 DIAGNOSIS — I10 HYPERTENSION, UNSPECIFIED TYPE: ICD-10-CM

## 2020-09-17 PROCEDURE — 99214 PR OFFICE/OUTPT VISIT, EST, LEVL IV, 30-39 MIN: ICD-10-PCS | Mod: 25,GC,S$GLB, | Performed by: INTERNAL MEDICINE

## 2020-09-17 PROCEDURE — 99999 PR PBB SHADOW E&M-EST. PATIENT-LVL IV: CPT | Mod: PBBFAC,GC,, | Performed by: INTERNAL MEDICINE

## 2020-09-17 PROCEDURE — 99214 OFFICE O/P EST MOD 30 MIN: CPT | Mod: 25,GC,S$GLB, | Performed by: INTERNAL MEDICINE

## 2020-09-17 PROCEDURE — 3079F PR MOST RECENT DIASTOLIC BLOOD PRESSURE 80-89 MM HG: ICD-10-PCS | Mod: CPTII,GC,S$GLB, | Performed by: INTERNAL MEDICINE

## 2020-09-17 PROCEDURE — 3077F PR MOST RECENT SYSTOLIC BLOOD PRESSURE >= 140 MM HG: ICD-10-PCS | Mod: CPTII,GC,S$GLB, | Performed by: INTERNAL MEDICINE

## 2020-09-17 PROCEDURE — 3077F SYST BP >= 140 MM HG: CPT | Mod: CPTII,GC,S$GLB, | Performed by: INTERNAL MEDICINE

## 2020-09-17 PROCEDURE — 3008F BODY MASS INDEX DOCD: CPT | Mod: CPTII,GC,S$GLB, | Performed by: INTERNAL MEDICINE

## 2020-09-17 PROCEDURE — 3008F PR BODY MASS INDEX (BMI) DOCUMENTED: ICD-10-PCS | Mod: CPTII,GC,S$GLB, | Performed by: INTERNAL MEDICINE

## 2020-09-17 PROCEDURE — 3079F DIAST BP 80-89 MM HG: CPT | Mod: CPTII,GC,S$GLB, | Performed by: INTERNAL MEDICINE

## 2020-09-17 PROCEDURE — 93000 ELECTROCARDIOGRAM COMPLETE: CPT | Mod: S$GLB,,, | Performed by: INTERNAL MEDICINE

## 2020-09-17 PROCEDURE — 93000 EKG 12-LEAD: ICD-10-PCS | Mod: S$GLB,,, | Performed by: INTERNAL MEDICINE

## 2020-09-17 PROCEDURE — 99999 PR PBB SHADOW E&M-EST. PATIENT-LVL IV: ICD-10-PCS | Mod: PBBFAC,GC,, | Performed by: INTERNAL MEDICINE

## 2020-09-17 RX ORDER — ROSUVASTATIN CALCIUM 10 MG/1
40 TABLET, COATED ORAL DAILY
Qty: 360 TABLET | Refills: 3 | Status: SHIPPED | OUTPATIENT
Start: 2020-09-17 | End: 2020-10-29

## 2020-09-17 NOTE — PROGRESS NOTES
PCP - Tom Diamond MD  Referring Physician:     Subjective:   Patient ID: Mr Daniel is a 63 y.o. male with a past medical history of CAD s/p 1V CABG (LIMA-LAD) s/p PCI to ostial LM (Mr 3.09vtv5vr) with flash ballooning and PCI to LCX/OM1 (2.15avy37sm, 2.04jqb99ny, 2.78xsy49js) 2013, PAD s/p REIA stent ([id number]), HTN, HLD, smoking, alcohol abuse, CKD and  NSTEMI 2019 who presents for care establishment.  The patient was last seen by Dr. Ernandez in 2019 after is NSTEMI.  He has not followed up with a cardiologist since then.  Since that time he has had symptoms of shortness of breath on exertion associated with chest pain, and claudication.  Due to his symptoms, he is not very active.  He continues to smoke a pack cigarettes per day.  He is also noncompliant with his medications and diet.  He last took his medications 1 week ago with the exception of carvedilol which he has been taking every day.  His blood pressure is high today.  He is in a hospital wheelchair as he cannot walk very far due to his symptoms.  At rest he denies any shortness of breath or chest pain.  His leg discomfort stems from his back and which she has chronic low back pain.    History:     Active Ambulatory Problems     Diagnosis Date Noted    Angina of effort 08/05/2013    Coronary artery disease involving native coronary artery of native heart without angina pectoris 08/05/2013    Smoker 08/05/2013    Alcohol abuse 08/05/2013    PVD (peripheral vascular disease) 08/05/2013    HTN (hypertension) 08/05/2013    HLD (hyperlipidemia) 08/05/2013    Coronary atherosclerosis of unspecified type of vessel, native or graft 08/07/2013    S/P CABG x 1 08/27/2013    Herniated thoracic disc without myelopathy 07/17/2014    Left leg weakness 08/17/2014    Thoracic disc disease 08/12/2014    Bilateral leg weakness 08/12/2014    NSTEMI (non-ST elevated myocardial infarction) 10/05/2014    NSTEMI (non-ST elevated myocardial  infarction)     Renal artery stenosis 10/06/2014    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    Carotid stenosis 01/05/2015    CKD (chronic kidney disease) stage 4, GFR 15-29 ml/min 01/05/2015    S/P carotid endarterectomy 01/23/2015    Atherosclerotic peripheral vascular disease with rest pain 12/18/2018    Cigarette nicotine dependence without complication 12/18/2018    Peripheral arterial disease 01/02/2019    Acute renal failure superimposed on stage 3 chronic kidney disease 03/01/2019    Non-traumatic rhabdomyolysis 03/03/2019    Metabolic acidosis 03/03/2019     Resolved Ambulatory Problems     Diagnosis Date Noted    Hyperglycemia 08/27/2013    Carotid artery stenosis, symptomatic 01/05/2015    Atherosclerotic peripheral vascular disease with intermittent claudication 01/19/2016    Encephalopathy, metabolic 03/01/2019     Past Medical History:   Diagnosis Date    Arthritis     CAD (coronary artery disease)     Coronary artery disease     Gout     Hernia     Hyperlipidemia     Myocardial infarct, old        Social History     Tobacco Use    Smoking status: Current Every Day Smoker     Packs/day: 1.00    Smokeless tobacco: Former User   Substance Use Topics    Alcohol use: Not Currently     Alcohol/week: 0.0 standard drinks     Comment: 3-4 cans of beer a day     Family History   Problem Relation Age of Onset    Hypertension Father     Heart attack Father     Heart disease Father     Heart failure Father     Hypertension Sister     Clotting disorder Sister     Stroke Sister     Hypertension Paternal Grandfather     No Known Problems Mother     No Known Problems Brother     No Known Problems Maternal Grandmother     No Known Problems Maternal Grandfather     No Known Problems Paternal Grandmother     No Known Problems Maternal Aunt     No Known Problems Maternal Uncle     No Known Problems Paternal Aunt     No Known Problems Paternal Uncle     Anemia Neg Hx     Arrhythmia  Neg Hx     Asthma Neg Hx     Fainting Neg Hx     Hyperlipidemia Neg Hx     Atrial Septal Defect Neg Hx        Meds:     Review of patient's allergies indicates:   Allergen Reactions    Iodine and iodide containing products      Anaphylactic rxn        Current Outpatient Medications:     allopurinol (ZYLOPRIM) 100 MG tablet, Take 100 mg by mouth once daily. , Disp: , Rfl:     amLODIPine (NORVASC) 10 MG tablet, amlodipine 10 mg tablet q HS, Disp: 90 tablet, Rfl: 3    aspirin (ECOTRIN) 81 MG EC tablet, Take 81 mg by mouth once daily., Disp: , Rfl:     carvedilol (COREG) 25 MG tablet, carvedilol 25 mg tablet TWO TIMES A DAY, Disp: 180 tablet, Rfl: 3    clopidogrel (PLAVIX) 75 mg tablet, clopidogrel 75 mg tablet daily, Disp: 90 tablet, Rfl: 3    colchicine (COLCRYS) 0.6 mg tablet, , Disp: , Rfl:     LORazepam (ATIVAN) 0.5 MG tablet, Ativan 0.5 mg tablet  Take 1 tablet by oral route at bedtime., Disp: , Rfl:     methylPREDNISolone (MEDROL) 4 MG Tab, Medrol (Harjeet) 4 mg tablets in a dose pack  Take 1 dose pk by oral route as directed., Disp: , Rfl:     nitroGLYCERIN (NITROSTAT) 0.4 MG SL tablet, nitroglycerin 0.4 mg sublingual tablet, Disp: , Rfl:     rosuvastatin (CRESTOR) 10 MG tablet, Take 4 tablets (40 mg total) by mouth once daily. Take have a tablet for 1 week and call Dr James Magdaleno. Then take a full tablet thereafter., Disp: 360 tablet, Rfl: 3    sodium bicarbonate 650 MG tablet, Take 1 tablet (650 mg total) by mouth 2 (two) times daily., Disp: 120 tablet, Rfl: 11    Review of Systems   Constitutional: Positive for malaise/fatigue. Negative for chills, fever and weight loss.   Eyes: Negative for blurred vision and double vision.   Respiratory: Positive for shortness of breath.    Cardiovascular: Positive for chest pain, orthopnea, claudication and leg swelling. Negative for palpitations and PND.   Gastrointestinal: Negative for heartburn and nausea.   Neurological: Positive for dizziness.  "  Psychiatric/Behavioral: Negative for depression.       Objective:   BP (!) 193/84 (BP Location: Left arm, Patient Position: Sitting, BP Method: Large (Automatic))   Pulse (!) 59   Ht 5' 10" (1.778 m)   Wt 90.4 kg (199 lb 4.7 oz)   BMI 28.60 kg/m²   Physical Exam   Constitutional: He is well-developed, well-nourished, and in no distress. No distress.   HENT:   Head: Normocephalic and atraumatic.   Neck: No tracheal deviation present.   Cardiovascular: Normal rate and regular rhythm. Exam reveals no gallop and no friction rub.   Murmur heard.  Pulses:       Carotid pulses are 2+ on the right side and 2+ on the left side.       Radial pulses are 2+ on the right side and 2+ on the left side.        Femoral pulses are 2+ on the right side and 2+ on the left side.       Dorsalis pedis pulses are 0 on the right side and 0 on the left side.        Posterior tibial pulses are 2+ on the right side and 0 on the left side.   II/VI systolic murmur heard at left sternal border   Pulmonary/Chest: No respiratory distress. He has no wheezes. He has rales.   Crackles right lower lung.   Abdominal: He exhibits no distension. There is no abdominal tenderness. There is no rebound.   Musculoskeletal:         General: Edema present. No tenderness or deformity.      Comments: 1+ edema noted bilateral lower extremities   Skin: No rash noted. He is not diaphoretic. No erythema. No pallor.       Labs:     Lab Results   Component Value Date     09/17/2020    K 5.3 (H) 09/17/2020     09/17/2020    CO2 24 09/17/2020    BUN 39 (H) 09/17/2020    CREATININE 3.2 (H) 09/17/2020    ANIONGAP 9 09/17/2020     Lab Results   Component Value Date    HGBA1C 6.1 12/18/2014     Lab Results   Component Value Date     (H) 03/01/2019     (H) 10/04/2014       Lab Results   Component Value Date    WBC 6.31 01/23/2020    HGB 13.9 (L) 01/23/2020    HCT 42.7 01/23/2020    HCT 39 03/01/2019     01/23/2020    GRAN 3.8 01/23/2020 "    GRAN 59.7 01/23/2020     Lab Results   Component Value Date    CHOL 284 (H) 09/17/2020    HDL 26 (L) 09/17/2020    LDLCALC 201.0 (H) 09/17/2020    TRIG 285 (H) 09/17/2020       Lab Results   Component Value Date     09/17/2020    K 5.3 (H) 09/17/2020     09/17/2020    CO2 24 09/17/2020    BUN 39 (H) 09/17/2020    CREATININE 3.2 (H) 09/17/2020    ANIONGAP 9 09/17/2020     Lab Results   Component Value Date    HGBA1C 6.1 12/18/2014     Lab Results   Component Value Date     (H) 03/01/2019     (H) 10/04/2014    Lab Results   Component Value Date    WBC 6.31 01/23/2020    HGB 13.9 (L) 01/23/2020    HCT 42.7 01/23/2020    HCT 39 03/01/2019     01/23/2020    GRAN 3.8 01/23/2020    GRAN 59.7 01/23/2020     Lab Results   Component Value Date    CHOL 284 (H) 09/17/2020    HDL 26 (L) 09/17/2020    LDLCALC 201.0 (H) 09/17/2020    TRIG 285 (H) 09/17/2020                Cardiovascular Imaging:     Echo: No results found for: EF    Stress test:     [facility]:    Assessment & Plan:     1. Coronary artery disease, angina presence unspecified, unspecified vessel or lesion type, unspecified whether native or transplanted heart    2.  Hypertensive heart disease  3.  Hyperlipidemia  4.  Medication noncompliance  5.  Peripheral arterial disease    Plan:  -adult discussed with patient today regarding his medication compliance.  I did explain to him that it is difficult to adjust his medication regimen if he is not taking them.  His symptoms of chest pain shortness of breath (stable angina) are chronic and likely due to his medication noncompliance and dietary noncompliance.  He continues to smoke cigarettes, and I did  him on this as well. He did state that he has refills with [facility]. His previous lipids were very high, per chart review.   -will change his atorvastatin to rosuvastatin 40 mg.  He is instructed to take 20 mg(half a pill) once a day for 1 week and call to report any symptoms  of myalgias.  If no myalgias, I did instruct him to take 40 mg once a day indefinitely.  -will continue his other cor heart medications( aspirin, Plavix, and carvedilol).  -will send a refill for sublingual nitroglycerin  -will follow-up with patient in 2 months  -message sent to patient's PCP.  I did include in the chest at that patient needs PFTs for official diagnosis of COPD which he likely has.        Signed:  James Magdaleno M.D.  Page # [phone]  Cardiovascular Fellow  [facility]

## 2020-10-29 ENCOUNTER — OFFICE VISIT (OUTPATIENT)
Dept: CARDIOLOGY | Facility: CLINIC | Age: 65
End: 2020-10-29
Payer: MEDICARE

## 2020-10-29 VITALS
SYSTOLIC BLOOD PRESSURE: 158 MMHG | HEIGHT: 69 IN | DIASTOLIC BLOOD PRESSURE: 72 MMHG | WEIGHT: 198.19 LBS | BODY MASS INDEX: 29.36 KG/M2 | HEART RATE: 65 BPM

## 2020-10-29 DIAGNOSIS — I73.9 PVD (PERIPHERAL VASCULAR DISEASE): Chronic | ICD-10-CM

## 2020-10-29 DIAGNOSIS — I10 ESSENTIAL HYPERTENSION: Chronic | ICD-10-CM

## 2020-10-29 DIAGNOSIS — N18.4 ACUTE RENAL FAILURE SUPERIMPOSED ON STAGE 4 CHRONIC KIDNEY DISEASE, UNSPECIFIED ACUTE RENAL FAILURE TYPE: ICD-10-CM

## 2020-10-29 DIAGNOSIS — N17.9 AKI (ACUTE KIDNEY INJURY): Primary | ICD-10-CM

## 2020-10-29 DIAGNOSIS — F17.200 SMOKER: Chronic | ICD-10-CM

## 2020-10-29 DIAGNOSIS — I10 ESSENTIAL HYPERTENSION: Primary | Chronic | ICD-10-CM

## 2020-10-29 DIAGNOSIS — I25.118 CORONARY ARTERY DISEASE OF NATIVE ARTERY WITH STABLE ANGINA PECTORIS, UNSPECIFIED WHETHER NATIVE OR TRANSPLANTED HEART: ICD-10-CM

## 2020-10-29 DIAGNOSIS — N17.9 ACUTE RENAL FAILURE SUPERIMPOSED ON STAGE 4 CHRONIC KIDNEY DISEASE, UNSPECIFIED ACUTE RENAL FAILURE TYPE: ICD-10-CM

## 2020-10-29 DIAGNOSIS — E78.2 MIXED HYPERLIPIDEMIA: Chronic | ICD-10-CM

## 2020-10-29 PROCEDURE — 3078F PR MOST RECENT DIASTOLIC BLOOD PRESSURE < 80 MM HG: ICD-10-PCS | Mod: CPTII,GC,S$GLB, | Performed by: INTERNAL MEDICINE

## 2020-10-29 PROCEDURE — 3008F PR BODY MASS INDEX (BMI) DOCUMENTED: ICD-10-PCS | Mod: CPTII,GC,S$GLB, | Performed by: INTERNAL MEDICINE

## 2020-10-29 PROCEDURE — 99999 PR PBB SHADOW E&M-EST. PATIENT-LVL III: CPT | Mod: PBBFAC,GC,, | Performed by: INTERNAL MEDICINE

## 2020-10-29 PROCEDURE — 3078F DIAST BP <80 MM HG: CPT | Mod: CPTII,GC,S$GLB, | Performed by: INTERNAL MEDICINE

## 2020-10-29 PROCEDURE — 99999 PR PBB SHADOW E&M-EST. PATIENT-LVL III: ICD-10-PCS | Mod: PBBFAC,GC,, | Performed by: INTERNAL MEDICINE

## 2020-10-29 PROCEDURE — 3077F SYST BP >= 140 MM HG: CPT | Mod: CPTII,GC,S$GLB, | Performed by: INTERNAL MEDICINE

## 2020-10-29 PROCEDURE — 3077F PR MOST RECENT SYSTOLIC BLOOD PRESSURE >= 140 MM HG: ICD-10-PCS | Mod: CPTII,GC,S$GLB, | Performed by: INTERNAL MEDICINE

## 2020-10-29 PROCEDURE — 3008F BODY MASS INDEX DOCD: CPT | Mod: CPTII,GC,S$GLB, | Performed by: INTERNAL MEDICINE

## 2020-10-29 PROCEDURE — 99214 PR OFFICE/OUTPT VISIT, EST, LEVL IV, 30-39 MIN: ICD-10-PCS | Mod: GC,S$GLB,, | Performed by: INTERNAL MEDICINE

## 2020-10-29 PROCEDURE — 99214 OFFICE O/P EST MOD 30 MIN: CPT | Mod: GC,S$GLB,, | Performed by: INTERNAL MEDICINE

## 2020-10-29 RX ORDER — CARVEDILOL 25 MG/1
TABLET ORAL
Qty: 180 TABLET | Refills: 3 | Status: SHIPPED | OUTPATIENT
Start: 2020-10-29

## 2020-10-29 RX ORDER — AMLODIPINE BESYLATE 10 MG/1
TABLET ORAL
Qty: 90 TABLET | Refills: 3 | Status: SHIPPED | OUTPATIENT
Start: 2020-10-29

## 2020-10-29 RX ORDER — LOSARTAN POTASSIUM 25 MG/1
25 TABLET ORAL DAILY
Status: ON HOLD | COMMUNITY
End: 2020-12-15 | Stop reason: HOSPADM

## 2020-10-29 RX ORDER — CLOPIDOGREL BISULFATE 75 MG/1
TABLET ORAL
Qty: 90 TABLET | Refills: 3 | Status: ON HOLD | OUTPATIENT
Start: 2020-10-29 | End: 2020-12-15 | Stop reason: HOSPADM

## 2020-10-29 RX ORDER — ROSUVASTATIN CALCIUM 40 MG/1
40 TABLET, COATED ORAL NIGHTLY
Qty: 90 TABLET | Refills: 3 | Status: ON HOLD | OUTPATIENT
Start: 2020-10-29 | End: 2020-12-15 | Stop reason: HOSPADM

## 2020-10-29 RX ORDER — ASPIRIN 81 MG/1
81 TABLET ORAL DAILY
Qty: 90 TABLET | Refills: 3 | Status: SHIPPED | OUTPATIENT
Start: 2020-10-29

## 2020-10-30 PROBLEM — N17.9 AKI (ACUTE KIDNEY INJURY): Status: ACTIVE | Noted: 2020-10-30

## 2020-10-30 PROBLEM — N18.4 ACUTE RENAL FAILURE SUPERIMPOSED ON STAGE 4 CHRONIC KIDNEY DISEASE: Status: ACTIVE | Noted: 2019-03-01

## 2020-10-30 NOTE — PROGRESS NOTES
PCP - Tom Diamond MD  Referring Physician:     Subjective:   Patient ID:  Mr Daniel is a 63 y.o. male with a past medical history of CAD s/p 1V CABG (LIMA-LAD) s/p PCI to ostial LM (Mr [id number]) with flash ballooning and PCI to LCX/OM1 (2.34iyn25nf, 2.37oas77nj, 2.42kpy39ks) 2013, PAD s/p REIA stent ([id number]), HTN, HLD, smoking, alcohol abuse, CKD and  NSTEMI 2019 who presents.  I last saw the patient in September for the 1st time.  At that time he was having symptoms of shortness of breath and chest pain which he continues to have.  His chest pain occurred approximately 2 weeks ago while he symptomatic callus was noted to be left-sided and radiated to his left shoulder.  It lasted approximately 15 minutes he rated at a 7/10.  He did not go to the ER.  He continues to smoke a pack cigarettes per day.  He is not compliant with his diet.  Unclear if it is a financial restraints as far as diet compliance goes.  Pertaining to his activity level, he is not very active.  He states he can only walk approximately 50 m before becoming short of breath or experiencing symptoms of claudication.  This is his baseline.  Nevertheless review labs showed an elevated creatinine and mildly elevated potassium.  He has nephrologist and which he has not followed up with.     History:     Social History     Tobacco Use    Smoking status: Current Every Day Smoker     Packs/day: 1.00    Smokeless tobacco: Former User   Substance Use Topics    Alcohol use: Not Currently     Alcohol/week: 0.0 standard drinks     Comment: 3-4 cans of beer a day     Family History   Problem Relation Age of Onset    Hypertension Father     Heart attack Father     Heart disease Father     Heart failure Father     Hypertension Sister     Clotting disorder Sister     Stroke Sister     Hypertension Paternal Grandfather     No Known Problems Mother     No Known Problems Brother     No Known Problems Maternal Grandmother     No Known  "Problems Maternal Grandfather     No Known Problems Paternal Grandmother     No Known Problems Maternal Aunt     No Known Problems Maternal Uncle     No Known Problems Paternal Aunt     No Known Problems Paternal Uncle     Anemia Neg Hx     Arrhythmia Neg Hx     Asthma Neg Hx     Fainting Neg Hx     Hyperlipidemia Neg Hx     Atrial Septal Defect Neg Hx        Meds:     Review of patient's allergies indicates:   Allergen Reactions    Iodine and iodide containing products      Anaphylactic rxn        Current Outpatient Medications:     amLODIPine (NORVASC) 10 MG tablet, amlodipine 10 mg tablet q HS, Disp: 90 tablet, Rfl: 3    aspirin (ECOTRIN) 81 MG EC tablet, Take 1 tablet (81 mg total) by mouth once daily., Disp: 90 tablet, Rfl: 3    carvediloL (COREG) 25 MG tablet, carvedilol 25 mg tablet TWO TIMES A DAY, Disp: 180 tablet, Rfl: 3    clopidogreL (PLAVIX) 75 mg tablet, clopidogrel 75 mg tablet daily, Disp: 90 tablet, Rfl: 3    colchicine (COLCRYS) 0.6 mg tablet, , Disp: , Rfl:     losartan (COZAAR) 25 MG tablet, Take 25 mg by mouth once daily., Disp: , Rfl:     nitroGLYCERIN (NITROSTAT) 0.4 MG SL tablet, nitroglycerin 0.4 mg sublingual tablet, Disp: , Rfl:     allopurinol (ZYLOPRIM) 100 MG tablet, Take 100 mg by mouth once daily. , Disp: , Rfl:     rosuvastatin (CRESTOR) 40 MG Tab, Take 1 tablet (40 mg total) by mouth every evening., Disp: 90 tablet, Rfl: 3      Review of Systems   Constitutional: Positive for malaise/fatigue. Negative for chills, fever and weight loss.   Eyes: Negative for blurred vision and double vision.   Respiratory: Positive for shortness of breath.    Cardiovascular: Positive for chest pain, orthopnea, claudication and leg swelling. Negative for palpitations and PND.   Gastrointestinal: Negative for heartburn and nausea.   Neurological: Positive for dizziness.   Psychiatric/Behavioral: Negative for depression.     Objective:   BP (!) 158/72   Pulse 65   Ht 5' 9" (1.753 " m)   Wt 89.9 kg (198 lb 3.1 oz)   BMI 29.27 kg/m²   Constitutional: He is well-developed, well-nourished, and in no distress. No distress.   Head: Normocephalic and atraumatic.   Neck: No tracheal deviation present.   Cardiovascular: Normal rate and regular rhythm. Exam reveals no gallop and no friction rub.   Murmur heard.  Pulses:       Carotid pulses are 2+ on the right side and 2+ on the left side.       Radial pulses are 2+ on the right side and 2+ on the left side.        Dorsalis pedis pulses are 0 on the right side and 0 on the left side.        Posterior tibial pulses are 2+ on the right side and 0 on the left side.   II/VI systolic murmur heard at left sternal border   Pulmonary/Chest: No respiratory distress. He has no wheezes. He has rales.   Crackles right lower lung.   Abdominal: He exhibits no distension. There is no abdominal tenderness. There is no rebound.   Musculoskeletal:         General: Edema present. No tenderness or deformity.      Comments: 1+ edema noted bilateral lower extremities   Skin: No rash noted. He is not diaphoretic. No erythema. No pallor.     Labs:     Lab Results   Component Value Date     10/29/2020    K 5.4 (H) 10/29/2020     10/29/2020    CO2 25 10/29/2020    BUN 45 (H) 10/29/2020    CREATININE 3.4 (H) 10/29/2020    ANIONGAP 9 10/29/2020     Lab Results   Component Value Date    HGBA1C 6.1 12/18/2014     Lab Results   Component Value Date     (H) 03/01/2019     (H) 10/04/2014       Lab Results   Component Value Date    WBC 6.31 01/23/2020    HGB 13.9 (L) 01/23/2020    HCT 42.7 01/23/2020    HCT 39 03/01/2019     01/23/2020    GRAN 3.8 01/23/2020    GRAN 59.7 01/23/2020     Lab Results   Component Value Date    CHOL 284 (H) 09/17/2020    HDL 26 (L) 09/17/2020    LDLCALC 201.0 (H) 09/17/2020    TRIG 285 (H) 09/17/2020       Lab Results   Component Value Date     10/29/2020    K 5.4 (H) 10/29/2020     10/29/2020    CO2 25  10/29/2020    BUN 45 (H) 10/29/2020    CREATININE 3.4 (H) 10/29/2020    ANIONGAP 9 10/29/2020     Lab Results   Component Value Date    HGBA1C 6.1 12/18/2014     Lab Results   Component Value Date     (H) 03/01/2019     (H) 10/04/2014    Lab Results   Component Value Date    WBC 6.31 01/23/2020    HGB 13.9 (L) 01/23/2020    HCT 42.7 01/23/2020    HCT 39 03/01/2019     01/23/2020    GRAN 3.8 01/23/2020    GRAN 59.7 01/23/2020     Lab Results   Component Value Date    CHOL 284 (H) 09/17/2020    HDL 26 (L) 09/17/2020    LDLCALC 201.0 (H) 09/17/2020    TRIG 285 (H) 09/17/2020            Assessment & Plan:     1.  Acute on chronic kidney disease:  Repeat labs show creatinine to increase slightly as well as his potassium.  I do believe patient is progressing towards dialysis if his blood pressure, diet, and lipids are not controlled.  Review of records shows 99% stenosis on his left renal artery and 30% stenosis and right renal artery from 2014 per angiogram with Dr. Gotti.  -I encouraged patient to make an appointment as soon as possible with his nephrologist  -I will send him a sick care message as well  -I also encouraged patient to increase his water intake as this may be a component of mild dehydration on top of his chronic kidney disease  -will repeat labs prior to his  next visit in 1 month.  Hopefully by then he would have seen his nephrologist  -will follow-up with patient on Monday via phone    2.  Coronary artery disease status post CABG  -will continue his current medication regimen with the exception of losartan  -will follow-up at next visit.  -I did instruct him to go to the ED should he experience any more chest pain or shortness of breath    3.  Hypertensive heart disease  -as stated above will continue his medication regimen (Coreg, and amlodipine).  Given his worsening kidney function will hold off on losartan    4.  Peripheral vascular disease:  -continue rosuvastatin and  Plavix    5.  Hyperlipidemia:  -rosuvastatin 40 mg daily  -diet discussed    6.  Tobacco abuse:  -patient likely has undiagnosed COPD  -he is encouraged to follow-up with his PCP for PFTs.  -tobacco cessation discussed    Signed:  James Magdaleno M.D.  Page # [phone]  Cardiovascular Fellow  [facility]

## 2020-12-12 ENCOUNTER — HOSPITAL ENCOUNTER (INPATIENT)
Facility: HOSPITAL | Age: 65
LOS: 3 days | Discharge: HOME | DRG: 246 | End: 2020-12-15
Attending: EMERGENCY MEDICINE | Admitting: INTERNAL MEDICINE
Payer: MEDICARE

## 2020-12-12 DIAGNOSIS — R07.9 CHEST PAIN, UNSPECIFIED TYPE: ICD-10-CM

## 2020-12-12 DIAGNOSIS — I21.4 NSTEMI (NON-ST ELEVATED MYOCARDIAL INFARCTION): Primary | ICD-10-CM

## 2020-12-12 DIAGNOSIS — I25.10 CAD (CORONARY ARTERY DISEASE): ICD-10-CM

## 2020-12-12 DIAGNOSIS — I25.118 CORONARY ARTERY DISEASE OF NATIVE ARTERY WITH STABLE ANGINA PECTORIS, UNSPECIFIED WHETHER NATIVE OR TRANSPLANTED HEART: ICD-10-CM

## 2020-12-12 DIAGNOSIS — R07.9 CHEST PAIN: ICD-10-CM

## 2020-12-12 LAB
ALBUMIN SERPL BCP-MCNC: 3.5 G/DL (ref 3.5–5.2)
ALP SERPL-CCNC: 149 U/L (ref 55–135)
ALT SERPL W/O P-5'-P-CCNC: 8 U/L (ref 10–44)
ANION GAP SERPL CALC-SCNC: 9 MMOL/L (ref 8–16)
APTT BLDCRRT: 32.8 SEC (ref 21–32)
APTT BLDCRRT: 41.1 SEC (ref 21–32)
AST SERPL-CCNC: 12 U/L (ref 10–40)
BASOPHILS # BLD AUTO: 0.02 K/UL (ref 0–0.2)
BASOPHILS NFR BLD: 0.3 % (ref 0–1.9)
BILIRUB SERPL-MCNC: 0.4 MG/DL (ref 0.1–1)
BNP SERPL-MCNC: 302 PG/ML (ref 0–99)
BUN SERPL-MCNC: 29 MG/DL (ref 8–23)
CALCIUM SERPL-MCNC: 9.5 MG/DL (ref 8.7–10.5)
CHLORIDE SERPL-SCNC: 106 MMOL/L (ref 95–110)
CO2 SERPL-SCNC: 23 MMOL/L (ref 23–29)
CREAT SERPL-MCNC: 3.6 MG/DL (ref 0.5–1.4)
CTP QC/QA: YES
DIFFERENTIAL METHOD: ABNORMAL
EOSINOPHIL # BLD AUTO: 0.2 K/UL (ref 0–0.5)
EOSINOPHIL NFR BLD: 2.5 % (ref 0–8)
ERYTHROCYTE [DISTWIDTH] IN BLOOD BY AUTOMATED COUNT: 13.9 % (ref 11.5–14.5)
EST. GFR  (AFRICAN AMERICAN): 19.5 ML/MIN/1.73 M^2
EST. GFR  (NON AFRICAN AMERICAN): 16.8 ML/MIN/1.73 M^2
GLUCOSE SERPL-MCNC: 132 MG/DL (ref 70–110)
HCT VFR BLD AUTO: 37.9 % (ref 40–54)
HGB BLD-MCNC: 12 G/DL (ref 14–18)
IMM GRANULOCYTES # BLD AUTO: 0.02 K/UL (ref 0–0.04)
IMM GRANULOCYTES NFR BLD AUTO: 0.3 % (ref 0–0.5)
INR PPP: 1 (ref 0.8–1.2)
LYMPHOCYTES # BLD AUTO: 1.3 K/UL (ref 1–4.8)
LYMPHOCYTES NFR BLD: 19.5 % (ref 18–48)
MCH RBC QN AUTO: 27.5 PG (ref 27–31)
MCHC RBC AUTO-ENTMCNC: 31.7 G/DL (ref 32–36)
MCV RBC AUTO: 87 FL (ref 82–98)
MONOCYTES # BLD AUTO: 0.2 K/UL (ref 0.3–1)
MONOCYTES NFR BLD: 2.6 % (ref 4–15)
NEUTROPHILS # BLD AUTO: 4.8 K/UL (ref 1.8–7.7)
NEUTROPHILS NFR BLD: 74.8 % (ref 38–73)
NRBC BLD-RTO: 0 /100 WBC
PLATELET # BLD AUTO: 124 K/UL (ref 150–350)
PMV BLD AUTO: 11.2 FL (ref 9.2–12.9)
POTASSIUM SERPL-SCNC: 4.6 MMOL/L (ref 3.5–5.1)
PROT SERPL-MCNC: 7.1 G/DL (ref 6–8.4)
PROTHROMBIN TIME: 10.9 SEC (ref 9–12.5)
RBC # BLD AUTO: 4.36 M/UL (ref 4.6–6.2)
SARS-COV-2 RDRP RESP QL NAA+PROBE: NEGATIVE
SODIUM SERPL-SCNC: 138 MMOL/L (ref 136–145)
TROPONIN I SERPL DL<=0.01 NG/ML-MCNC: 0.59 NG/ML (ref 0–0.03)
TROPONIN I SERPL DL<=0.01 NG/ML-MCNC: 2.54 NG/ML (ref 0–0.03)
WBC # BLD AUTO: 6.45 K/UL (ref 3.9–12.7)

## 2020-12-12 PROCEDURE — 83880 ASSAY OF NATRIURETIC PEPTIDE: CPT

## 2020-12-12 PROCEDURE — 63600175 PHARM REV CODE 636 W HCPCS: Performed by: STUDENT IN AN ORGANIZED HEALTH CARE EDUCATION/TRAINING PROGRAM

## 2020-12-12 PROCEDURE — 99223 PR INITIAL HOSPITAL CARE,LEVL III: ICD-10-PCS | Mod: AI,GC,, | Performed by: INTERNAL MEDICINE

## 2020-12-12 PROCEDURE — 96365 THER/PROPH/DIAG IV INF INIT: CPT

## 2020-12-12 PROCEDURE — 99223 1ST HOSP IP/OBS HIGH 75: CPT | Mod: ,,, | Performed by: INTERNAL MEDICINE

## 2020-12-12 PROCEDURE — 85730 THROMBOPLASTIN TIME PARTIAL: CPT | Mod: 91

## 2020-12-12 PROCEDURE — 25000003 PHARM REV CODE 250: Performed by: STUDENT IN AN ORGANIZED HEALTH CARE EDUCATION/TRAINING PROGRAM

## 2020-12-12 PROCEDURE — 99223 1ST HOSP IP/OBS HIGH 75: CPT | Mod: AI,GC,, | Performed by: INTERNAL MEDICINE

## 2020-12-12 PROCEDURE — 80061 LIPID PANEL: CPT

## 2020-12-12 PROCEDURE — 80053 COMPREHEN METABOLIC PANEL: CPT

## 2020-12-12 PROCEDURE — 20000000 HC ICU ROOM

## 2020-12-12 PROCEDURE — 84443 ASSAY THYROID STIM HORMONE: CPT

## 2020-12-12 PROCEDURE — 96375 TX/PRO/DX INJ NEW DRUG ADDON: CPT

## 2020-12-12 PROCEDURE — 85610 PROTHROMBIN TIME: CPT

## 2020-12-12 PROCEDURE — 84484 ASSAY OF TROPONIN QUANT: CPT

## 2020-12-12 PROCEDURE — U0002 COVID-19 LAB TEST NON-CDC: HCPCS | Performed by: STUDENT IN AN ORGANIZED HEALTH CARE EDUCATION/TRAINING PROGRAM

## 2020-12-12 PROCEDURE — 85025 COMPLETE CBC W/AUTO DIFF WBC: CPT

## 2020-12-12 PROCEDURE — 99223 PR INITIAL HOSPITAL CARE,LEVL III: ICD-10-PCS | Mod: ,,, | Performed by: INTERNAL MEDICINE

## 2020-12-12 PROCEDURE — 96366 THER/PROPH/DIAG IV INF ADDON: CPT

## 2020-12-12 PROCEDURE — 84484 ASSAY OF TROPONIN QUANT: CPT | Mod: 91

## 2020-12-12 PROCEDURE — 85730 THROMBOPLASTIN TIME PARTIAL: CPT

## 2020-12-12 PROCEDURE — 99291 CRITICAL CARE FIRST HOUR: CPT | Mod: 25

## 2020-12-12 PROCEDURE — 99291 CRITICAL CARE FIRST HOUR: CPT | Mod: CS,,, | Performed by: EMERGENCY MEDICINE

## 2020-12-12 PROCEDURE — 99291 PR CRITICAL CARE, E/M 30-74 MINUTES: ICD-10-PCS | Mod: CS,,, | Performed by: EMERGENCY MEDICINE

## 2020-12-12 PROCEDURE — 63600175 PHARM REV CODE 636 W HCPCS: Performed by: EMERGENCY MEDICINE

## 2020-12-12 PROCEDURE — 94761 N-INVAS EAR/PLS OXIMETRY MLT: CPT

## 2020-12-12 RX ORDER — AMLODIPINE BESYLATE 10 MG/1
10 TABLET ORAL NIGHTLY
Status: DISCONTINUED | OUTPATIENT
Start: 2020-12-12 | End: 2020-12-15 | Stop reason: HOSPADM

## 2020-12-12 RX ORDER — CLOPIDOGREL BISULFATE 75 MG/1
75 TABLET ORAL DAILY
Status: DISCONTINUED | OUTPATIENT
Start: 2020-12-13 | End: 2020-12-13

## 2020-12-12 RX ORDER — TALC
6 POWDER (GRAM) TOPICAL NIGHTLY PRN
Status: DISCONTINUED | OUTPATIENT
Start: 2020-12-12 | End: 2020-12-15 | Stop reason: HOSPADM

## 2020-12-12 RX ORDER — IBUPROFEN 200 MG
1 TABLET ORAL DAILY
Status: DISCONTINUED | OUTPATIENT
Start: 2020-12-13 | End: 2020-12-12

## 2020-12-12 RX ORDER — ONDANSETRON 2 MG/ML
4 INJECTION INTRAMUSCULAR; INTRAVENOUS EVERY 8 HOURS PRN
Status: DISCONTINUED | OUTPATIENT
Start: 2020-12-12 | End: 2020-12-15 | Stop reason: HOSPADM

## 2020-12-12 RX ORDER — HEPARIN SODIUM 10000 [USP'U]/100ML
60 INJECTION, SOLUTION INTRAVENOUS
Status: DISCONTINUED | OUTPATIENT
Start: 2020-12-12 | End: 2020-12-12 | Stop reason: SDUPTHER

## 2020-12-12 RX ORDER — FOLIC ACID 1 MG/1
1 TABLET ORAL DAILY
Status: DISCONTINUED | OUTPATIENT
Start: 2020-12-13 | End: 2020-12-14

## 2020-12-12 RX ORDER — SODIUM CHLORIDE 0.9 % (FLUSH) 0.9 %
10 SYRINGE (ML) INJECTION
Status: DISCONTINUED | OUTPATIENT
Start: 2020-12-12 | End: 2020-12-15 | Stop reason: HOSPADM

## 2020-12-12 RX ORDER — ASPIRIN 325 MG
325 TABLET ORAL ONCE
Status: COMPLETED | OUTPATIENT
Start: 2020-12-12 | End: 2020-12-12

## 2020-12-12 RX ORDER — CARVEDILOL 25 MG/1
25 TABLET ORAL 2 TIMES DAILY
Status: DISCONTINUED | OUTPATIENT
Start: 2020-12-12 | End: 2020-12-15 | Stop reason: HOSPADM

## 2020-12-12 RX ORDER — ACETAMINOPHEN 325 MG/1
650 TABLET ORAL EVERY 4 HOURS PRN
Status: DISCONTINUED | OUTPATIENT
Start: 2020-12-12 | End: 2020-12-15 | Stop reason: HOSPADM

## 2020-12-12 RX ORDER — METHYLPREDNISOLONE SOD SUCC 125 MG
125 VIAL (EA) INJECTION
Status: COMPLETED | OUTPATIENT
Start: 2020-12-12 | End: 2020-12-12

## 2020-12-12 RX ORDER — POLYETHYLENE GLYCOL 3350 17 G/17G
17 POWDER, FOR SOLUTION ORAL CONTINUOUS PRN
Status: DISCONTINUED | OUTPATIENT
Start: 2020-12-12 | End: 2020-12-15 | Stop reason: HOSPADM

## 2020-12-12 RX ORDER — ROSUVASTATIN CALCIUM 20 MG/1
40 TABLET, COATED ORAL NIGHTLY
Status: DISCONTINUED | OUTPATIENT
Start: 2020-12-12 | End: 2020-12-12

## 2020-12-12 RX ORDER — NITROGLYCERIN 20 MG/100ML
10 INJECTION INTRAVENOUS CONTINUOUS
Status: DISCONTINUED | OUTPATIENT
Start: 2020-12-12 | End: 2020-12-13

## 2020-12-12 RX ORDER — THIAMINE HCL 100 MG
100 TABLET ORAL DAILY
Status: DISCONTINUED | OUTPATIENT
Start: 2020-12-13 | End: 2020-12-14

## 2020-12-12 RX ORDER — IBUPROFEN 200 MG
1 TABLET ORAL DAILY
Status: DISCONTINUED | OUTPATIENT
Start: 2020-12-13 | End: 2020-12-15 | Stop reason: HOSPADM

## 2020-12-12 RX ORDER — ATORVASTATIN CALCIUM 20 MG/1
80 TABLET, FILM COATED ORAL NIGHTLY
Status: DISCONTINUED | OUTPATIENT
Start: 2020-12-12 | End: 2020-12-15 | Stop reason: HOSPADM

## 2020-12-12 RX ORDER — HEPARIN SODIUM,PORCINE/D5W 25000/250
12 INTRAVENOUS SOLUTION INTRAVENOUS CONTINUOUS
Status: DISCONTINUED | OUTPATIENT
Start: 2020-12-12 | End: 2020-12-13

## 2020-12-12 RX ORDER — ASPIRIN 81 MG/1
81 TABLET ORAL DAILY
Status: DISCONTINUED | OUTPATIENT
Start: 2020-12-13 | End: 2020-12-15 | Stop reason: HOSPADM

## 2020-12-12 RX ADMIN — HEPARIN SODIUM AND DEXTROSE 12 UNITS/KG/HR: 10000; 5 INJECTION INTRAVENOUS at 05:12

## 2020-12-12 RX ADMIN — ATORVASTATIN CALCIUM 80 MG: 20 TABLET, FILM COATED ORAL at 10:12

## 2020-12-12 RX ADMIN — ASPIRIN 325 MG ORAL TABLET 325 MG: 325 PILL ORAL at 09:12

## 2020-12-12 RX ADMIN — AMLODIPINE BESYLATE 10 MG: 10 TABLET ORAL at 09:12

## 2020-12-12 RX ADMIN — METHYLPREDNISOLONE SODIUM SUCCINATE 125 MG: 125 INJECTION, POWDER, FOR SOLUTION INTRAMUSCULAR; INTRAVENOUS at 03:12

## 2020-12-12 RX ADMIN — CARVEDILOL 25 MG: 25 TABLET, FILM COATED ORAL at 09:12

## 2020-12-12 RX ADMIN — MELATONIN TAB 3 MG 6 MG: 3 TAB at 10:12

## 2020-12-12 RX ADMIN — NITROGLYCERIN 40 MCG/MIN: 20 INJECTION INTRAVENOUS at 06:12

## 2020-12-12 RX ADMIN — NITROGLYCERIN 10 MCG/MIN: 20 INJECTION INTRAVENOUS at 03:12

## 2020-12-12 NOTE — Clinical Note
The catheter is inserted mid   first OM artery. ULTRASOUND CATHETER INSERTED AND IVUS PERFORMED AND CATHETER IS REMOVED

## 2020-12-12 NOTE — ED PROVIDER NOTES
"Encounter Date: 12/12/2020       History     Chief Complaint   Patient presents with    Chest Pain     Chest pain since this morning. Cardiac history     This is a 65-year-old male with history of ACS, status post 8 stents, hypertension, hyperlipidemia who presents to the ED with chest pain.  Patient stated that his chest pain started this morning, feels "like the last time I had a heart attack", rates it a 5/10, with radiation to bilateral arms.  Patient took 5 nitros at home without relief, got 2 more from EMS with mild relief.  Patient states his pain is now 4/10.  Patient also states he had associated shortness of breath, but denies lightheadedness, dizziness nausea vomiting, diaphoresis.        Review of patient's allergies indicates:   Allergen Reactions    Iodine and iodide containing products      Anaphylactic rxn      Past Medical History:   Diagnosis Date    Alcohol abuse     Angina of effort     Arthritis     CAD (coronary artery disease)     Carotid artery stenosis, symptomatic 12/30/2014    Coronary artery disease     Gout     Hernia     HTN (hypertension)     Hyperlipidemia     Myocardial infarct, old     Smoker active     Past Surgical History:   Procedure Laterality Date    ANGIOPLASTY      CARDIAC CATHETERIZATION      CARDIAC CATHETERIZATION      CORONARY ANGIOPLASTY      CORONARY ARTERY BYPASS GRAFT      FLUOROSCOPIC ANGIOGRAPHY OF LOWER EXTREMITY WITH TOPICAL ULTRASOUND Right 1/2/2019    Procedure: ARTERIOGRAM-LEG AND ULTRASOUND;  Surgeon: GINA Vuong III, MD;  Location: [facility] OR [address] [place] [address];  Service: Peripheral Vascular;  Laterality: Right;  5.1 min  753.92 mGy  8ml Dye  330ml CO2  6ml Local    HEMORRHOID SURGERY      PERCUTANEOUS TRANSLUMINAL ANGIOPLASTY N/A 1/2/2019    Procedure: PTA (ANGIOPLASTY, PERCUTANEOUS, TRANSLUMINAL);  Surgeon: GINA Vuong III, MD;  Location: [facility] OR [address] [place] [address];  Service: Peripheral Vascular;  Laterality: N/A;    TONSILLECTOMY       Family " History   Problem Relation Age of Onset    Hypertension Father     Heart attack Father     Heart disease Father     Heart failure Father     Hypertension Sister     Clotting disorder Sister     Stroke Sister     Hypertension Paternal Grandfather     No Known Problems Mother     No Known Problems Brother     No Known Problems Maternal Grandmother     No Known Problems Maternal Grandfather     No Known Problems Paternal Grandmother     No Known Problems Maternal Aunt     No Known Problems Maternal Uncle     No Known Problems Paternal Aunt     No Known Problems Paternal Uncle     Anemia Neg Hx     Arrhythmia Neg Hx     Asthma Neg Hx     Fainting Neg Hx     Hyperlipidemia Neg Hx     Atrial Septal Defect Neg Hx      Social History     Tobacco Use    Smoking status: Current Every Day Smoker     Packs/day: 1.00    Smokeless tobacco: Former User   Substance Use Topics    Alcohol use: Not Currently     Alcohol/week: 0.0 standard drinks     Comment: 3-4 cans of beer a day    Drug use: No     Review of Systems   Constitutional: Negative for chills, diaphoresis, fatigue and fever.   HENT: Negative for congestion and sore throat.    Eyes: Negative for discharge and redness.   Respiratory: Positive for shortness of breath. Negative for cough, chest tightness and wheezing.    Cardiovascular: Positive for chest pain. Negative for palpitations and leg swelling.   Gastrointestinal: Negative for abdominal distention, abdominal pain, constipation, diarrhea, nausea and vomiting.   Genitourinary: Negative for dysuria, frequency and urgency.   Musculoskeletal: Negative for joint swelling, neck pain and neck stiffness.   Skin: Negative for color change, pallor, rash and wound.   Neurological: Negative for dizziness, weakness, light-headedness, numbness and headaches.       Physical Exam     Initial Vitals [12/12/20 1451]   BP Pulse Resp Temp SpO2   (!) 155/115 62 18 98.3 °F (36.8 °C) 100 %      MAP       --          Physical Exam    Nursing note and vitals reviewed.  Constitutional: He appears well-developed and well-nourished. He is not diaphoretic. He does not appear ill. No distress.   Uncomfortable laying in bed   HENT:   Head: Normocephalic and atraumatic.   Eyes: Conjunctivae and EOM are normal. Right eye exhibits no discharge. Left eye exhibits no discharge. Right conjunctiva is not injected. Left conjunctiva is not injected. No scleral icterus.   Neck: Normal range of motion.   Cardiovascular: Normal rate, regular rhythm and intact distal pulses. Exam reveals no gallop and no friction rub.    No murmur heard.  Pulmonary/Chest: No respiratory distress. He has no wheezes. He has no rhonchi. He has no rales. He exhibits no tenderness.   Abdominal: Soft. Bowel sounds are normal. He exhibits no distension and no mass. There is no abdominal tenderness. There is no rebound and no guarding.   Neurological: He is alert and oriented to person, place, and time. GCS score is 15. GCS eye subscore is 4. GCS verbal subscore is 5. GCS motor subscore is 6.   Skin: Skin is warm and dry. No rash noted. No erythema. No pallor.         ED Course   Procedures  Labs Reviewed   CBC W/ AUTO DIFFERENTIAL - Abnormal; Notable for the following components:       Result Value    RBC 4.36 (*)     Hemoglobin 12.0 (*)     Hematocrit 37.9 (*)     MCHC 31.7 (*)     Platelets 124 (*)     Mono # 0.2 (*)     Gran % 74.8 (*)     Mono % 2.6 (*)     All other components within normal limits   COMPREHENSIVE METABOLIC PANEL - Abnormal; Notable for the following components:    Glucose 132 (*)     BUN 29 (*)     Creatinine 3.6 (*)     Alkaline Phosphatase 149 (*)     ALT 8 (*)     eGFR if  19.5 (*)     eGFR if non  16.8 (*)     All other components within normal limits   TROPONIN I - Abnormal; Notable for the following components:    Troponin I 0.594 (*)     All other components within normal limits   B-TYPE NATRIURETIC PEPTIDE  - Abnormal; Notable for the following components:     (*)     All other components within normal limits   APTT - Abnormal; Notable for the following components:    aPTT 32.8 (*)     All other components within normal limits    Narrative:     (if patient is on warfarin prior to heparin therapy)   TROPONIN I - Abnormal; Notable for the following components:    Troponin I 2.545 (*)     All other components within normal limits   SARS-COV-2 RDRP GENE - Normal    Narrative:     This test utilizes isothermal nucleic acid amplification   technology to detect the SARS-CoV-2 RdRp nucleic acid segment.   The analytical sensitivity (limit of detection) is 125 genome   equivalents/mL.   A POSITIVE result implies infection with the SARS-CoV-2 virus;   the patient is presumed to be contagious.     A NEGATIVE result means that SARS-CoV-2 nucleic acids are not   present above the limit of detection. A NEGATIVE result should be   treated as presumptive. It does not rule out the possibility of   COVID-19 and should not be the sole basis for treatment decisions.   If COVID-19 is strongly suspected based on clinical and exposure   history, re-testing using an alternate molecular assay should be   considered.   This test is only for use under the Food and Drug   Administration s Emergency Use Authorization (EUA).   Commercial kits are provided by [employer].   Performance characteristics of the EUA have been independently   verified by [facility] Department of   Pathology and Laboratory Medicine.   _________________________________________________________________   The authorized Fact Sheet for Healthcare Providers and the authorized Fact   Sheet for Patients of the ID NOW COVID-19 are available on the FDA   website:     https://www.fda.gov/media/048868/download  https://www.fda.gov/media/462881/download       PROTIME-INR    Narrative:     (if patient is on warfarin prior to heparin therapy)     EKG Readings:  "(Independently Interpreted)   Sinus bradycardia, rate 59.  New ST elevation lead V1, V2, V3 with reciprocal ST depressions T-wave inversions in lead 2, AVF not seen on previous EKG of September 17, 2020.  Does not fulfill criteria for STEMI.  Deep and T-wave inversions in leads 2, 3       Imaging Results    None       X-Rays:   Independently Interpreted Readings:   Other Readings:  No pneumonia, pneumothorax, pleural effusion noticed    Medical Decision Making:   History:   Old Medical Records: I decided to obtain old medical records.  Old Records Summarized: records from clinic visits and records from previous admission(s).       <> Summary of Records: Patient has history of ACS, status post 8 stents.  Initial Assessment:   65-year-old male with history of ACS presenting to the ED with chest pain that he describes as "like last time I had a heart attack."  Patient took a total of 7 nitros with very minimal relief of pain.  Patient's blood pressure is stable.  Differential Diagnosis:   ACS, pneumonia, pneumothorax, pleural effusion, cardiac tamponade  Independently Interpreted Test(s):   I have ordered and independently interpreted X-rays - see prior notes.  I have ordered and independently interpreted EKG Reading(s) - see prior notes  Clinical Tests:   Lab Tests: Ordered and Reviewed  Radiological Study: Ordered and Reviewed  Medical Tests: Ordered and Reviewed  ED Management:  EKG changes are concerning.  CBC, CMP, BNP repeat EKG, troponin, CXR ordered.  Cardiology was consulted because of severity of symptoms, patient's history, and EKG changes concerning for ACS.  Troponin elevated at 0.594.  CBC, CMP unremarkable.  Repeat EKG shows dynamic changes with increased elevations in V1, V2, V3; however, patient does fulfill STEMI criteria.  CXR concerning for pneumonia, pneumothorax or pleural effusion.  Patient given Solu-Medrol in the ED as patient is allergic to iodine.  In addition, patient started on heparin per " ACS protocol.  Patient admitted to CCU for further treatment and care.  Other:   I have discussed this case with another health care provider.       <> Summary of the Discussion: Cardiology was consulted, and their recommendations as per above.                             Clinical Impression:     ICD-10-CM ICD-9-CM   1. NSTEMI (non-ST elevated myocardial infarction)  I21.4 410.70   2. Chest pain, unspecified type  R07.9 786.50                          ED Disposition Condition    Admit                             Maycol Haddad MD  Resident  12/12/20 1903       Maycol Haddad MD  Resident  12/12/20 1904

## 2020-12-12 NOTE — ED NOTES
"Emanuel Daniel, a 65 y.o. male presents to the ED with chest pain that started early this morning. Pt describes the pain as dull and crushing, "it feels like it felt when I had a heart attack." The pain radiates to both upper extremities. Pt took 5 nitro @ home with no relief, received 2 nitro from EMS also with no relief      Chief Complaint   Patient presents with    Chest Pain     Chest pain since this morning. Cardiac history     Review of patient's allergies indicates:   Allergen Reactions    Iodine and iodide containing products      Anaphylactic rxn      Past Medical History:   Diagnosis Date    Alcohol abuse     Angina of effort     Arthritis     CAD (coronary artery disease)     Carotid artery stenosis, symptomatic 12/30/2014    Coronary artery disease     Gout     Hernia     HTN (hypertension)     Hyperlipidemia     Myocardial infarct, old     Smoker active     LOC: Patient name and date of birth verified. The patient is awake, alert and aware of environment with an appropriate affect, the patient is oriented x 3 and speaking appropriately.   APPEARANCE: Patient resting comfortably, patient is clean and well groomed, patient's clothing is properly fastened.  SKIN: The skin is warm and dry, color consistent with ethnicity, patient has normal skin turgor and moist mucus membranes, skin intact, no breakdown or bruising noted.  MUSCULOSKELETAL: Patient moving all extremities well, no obvious swelling or deformities noted.   RESPIRATORY: Respirations are spontaneous, patient has a normal effort and rate, no accessory muscle use noted.  CARDIAC: Patient has a normal rate and rhythm, no periphreal edema noted, capillary refill < 3 seconds. Pt complains of chest pain that radiates to right and left arms.   ABDOMEN: Soft and non tender to palpation, no distention noted. Bowel sounds present in all four quadrants.  NEUROLOGIC: Eyes open spontaneously, behavior appropriate to situation, follows " commands, facial expression symmetrical, bilateral hand grasp equal and even, purposeful motor response noted, normal sensation in all extremities when touched with a finger.

## 2020-12-12 NOTE — Clinical Note
The catheter is inserted mid   circumflex. ULTRASOUND CATHETER INSERTED AND IVUS PERFORMED AND CATHETER REMOVED

## 2020-12-12 NOTE — Clinical Note
The catheter is inserted LIMA graft. Angiography performed of the graft. Multiple views were taken. Angiography performed via hand injection with   Catheter removed

## 2020-12-12 NOTE — Clinical Note
The catheter is inserted ostium   left main. Angiography performed of the left coronary arteries. Multiple views were taken. Angiography performed via hand injection with   Catheter removed

## 2020-12-12 NOTE — ED TRIAGE NOTES
Pt woke up this AM and felt substernal chest pain radiating down both upper extremities. Chest pain characterized as crushing, and unrelieved by nitro @ home.

## 2020-12-12 NOTE — Clinical Note
175 ml injected throughout the case. 225 mL total wasted during the case. 400 mL total used in the case.

## 2020-12-13 LAB
ALBUMIN SERPL BCP-MCNC: 3.2 G/DL (ref 3.5–5.2)
ALBUMIN SERPL BCP-MCNC: 3.3 G/DL (ref 3.5–5.2)
ALP SERPL-CCNC: 131 U/L (ref 55–135)
ALP SERPL-CCNC: 134 U/L (ref 55–135)
ALT SERPL W/O P-5'-P-CCNC: 12 U/L (ref 10–44)
ALT SERPL W/O P-5'-P-CCNC: 13 U/L (ref 10–44)
ANION GAP SERPL CALC-SCNC: 11 MMOL/L (ref 8–16)
ANION GAP SERPL CALC-SCNC: 11 MMOL/L (ref 8–16)
APTT BLDCRRT: 108.8 SEC (ref 21–32)
APTT BLDCRRT: 37.3 SEC (ref 21–32)
ASCENDING AORTA: 3.67 CM
AST SERPL-CCNC: 39 U/L (ref 10–40)
AST SERPL-CCNC: 49 U/L (ref 10–40)
AV INDEX (PROSTH): 0.67
AV MEAN GRADIENT: 3 MMHG
AV PEAK GRADIENT: 5 MMHG
AV VALVE AREA: 2.39 CM2
AV VELOCITY RATIO: 0.79
BASOPHILS # BLD AUTO: 0.01 K/UL (ref 0–0.2)
BASOPHILS # BLD AUTO: 0.01 K/UL (ref 0–0.2)
BASOPHILS NFR BLD: 0.1 % (ref 0–1.9)
BASOPHILS NFR BLD: 0.1 % (ref 0–1.9)
BILIRUB SERPL-MCNC: 0.2 MG/DL (ref 0.1–1)
BILIRUB SERPL-MCNC: 0.3 MG/DL (ref 0.1–1)
BSA FOR ECHO PROCEDURE: 2.12 M2
BUN SERPL-MCNC: 31 MG/DL (ref 8–23)
BUN SERPL-MCNC: 32 MG/DL (ref 8–23)
CALCIUM SERPL-MCNC: 7.8 MG/DL (ref 8.7–10.5)
CALCIUM SERPL-MCNC: 9.3 MG/DL (ref 8.7–10.5)
CHLORIDE SERPL-SCNC: 104 MMOL/L (ref 95–110)
CHLORIDE SERPL-SCNC: 108 MMOL/L (ref 95–110)
CHOLEST SERPL-MCNC: 174 MG/DL (ref 120–199)
CHOLEST/HDLC SERPL: 6.7 {RATIO} (ref 2–5)
CO2 SERPL-SCNC: 17 MMOL/L (ref 23–29)
CO2 SERPL-SCNC: 19 MMOL/L (ref 23–29)
CREAT SERPL-MCNC: 2.8 MG/DL (ref 0.5–1.4)
CREAT SERPL-MCNC: 3.3 MG/DL (ref 0.5–1.4)
CV ECHO LV RWT: 0.38 CM
DIFFERENTIAL METHOD: ABNORMAL
DIFFERENTIAL METHOD: ABNORMAL
DOP CALC AO PEAK VEL: 1.07 M/S
DOP CALC AO VTI: 22.93 CM
DOP CALC LVOT AREA: 3.6 CM2
DOP CALC LVOT DIAMETER: 2.14 CM
DOP CALC LVOT PEAK VEL: 0.84 M/S
DOP CALC LVOT STROKE VOLUME: 54.82 CM3
DOP CALCLVOT PEAK VEL VTI: 15.25 CM
E WAVE DECELERATION TIME: 136.98 MSEC
E/A RATIO: 2.24
E/E' RATIO: 19 M/S
ECHO LV POSTERIOR WALL: 1.07 CM (ref 0.6–1.1)
EOSINOPHIL # BLD AUTO: 0 K/UL (ref 0–0.5)
EOSINOPHIL # BLD AUTO: 0 K/UL (ref 0–0.5)
EOSINOPHIL NFR BLD: 0 % (ref 0–8)
EOSINOPHIL NFR BLD: 0 % (ref 0–8)
ERYTHROCYTE [DISTWIDTH] IN BLOOD BY AUTOMATED COUNT: 13.9 % (ref 11.5–14.5)
ERYTHROCYTE [DISTWIDTH] IN BLOOD BY AUTOMATED COUNT: 14 % (ref 11.5–14.5)
EST. GFR  (AFRICAN AMERICAN): 21.6 ML/MIN/1.73 M^2
EST. GFR  (AFRICAN AMERICAN): 26.4 ML/MIN/1.73 M^2
EST. GFR  (NON AFRICAN AMERICAN): 18.7 ML/MIN/1.73 M^2
EST. GFR  (NON AFRICAN AMERICAN): 22.8 ML/MIN/1.73 M^2
ESTIMATED AVG GLUCOSE: 108 MG/DL (ref 68–131)
FRACTIONAL SHORTENING: 14 % (ref 28–44)
GLUCOSE SERPL-MCNC: 131 MG/DL (ref 70–110)
GLUCOSE SERPL-MCNC: 219 MG/DL (ref 70–110)
HBA1C MFR BLD HPLC: 5.4 % (ref 4–5.6)
HCT VFR BLD AUTO: 34.3 % (ref 40–54)
HCT VFR BLD AUTO: 36.7 % (ref 40–54)
HDLC SERPL-MCNC: 26 MG/DL (ref 40–75)
HDLC SERPL: 14.9 % (ref 20–50)
HGB BLD-MCNC: 10.8 G/DL (ref 14–18)
HGB BLD-MCNC: 11.6 G/DL (ref 14–18)
IMM GRANULOCYTES # BLD AUTO: 0.06 K/UL (ref 0–0.04)
IMM GRANULOCYTES # BLD AUTO: 0.07 K/UL (ref 0–0.04)
IMM GRANULOCYTES NFR BLD AUTO: 0.6 % (ref 0–0.5)
IMM GRANULOCYTES NFR BLD AUTO: 0.8 % (ref 0–0.5)
INTERVENTRICULAR SEPTUM: 0.98 CM (ref 0.6–1.1)
IVRT: 91.34 MSEC
LA MAJOR: 5.7 CM
LA MINOR: 5.7 CM
LA WIDTH: 3.86 CM
LDLC SERPL CALC-MCNC: 122 MG/DL (ref 63–159)
LEFT ATRIUM SIZE: 4.63 CM
LEFT ATRIUM VOLUME INDEX MOD: 21.6 ML/M2
LEFT ATRIUM VOLUME INDEX: 41.1 ML/M2
LEFT ATRIUM VOLUME MOD: 45.62 CM3
LEFT ATRIUM VOLUME: 86.59 CM3
LEFT INTERNAL DIMENSION IN SYSTOLE: 4.85 CM (ref 2.1–4)
LEFT VENTRICLE DIASTOLIC VOLUME INDEX: 74.95 ML/M2
LEFT VENTRICLE DIASTOLIC VOLUME: 157.97 ML
LEFT VENTRICLE MASS INDEX: 110 G/M2
LEFT VENTRICLE SYSTOLIC VOLUME INDEX: 52.3 ML/M2
LEFT VENTRICLE SYSTOLIC VOLUME: 110.25 ML
LEFT VENTRICULAR INTERNAL DIMENSION IN DIASTOLE: 5.67 CM (ref 3.5–6)
LEFT VENTRICULAR MASS: 231.74 G
LV LATERAL E/E' RATIO: 19 M/S
LV SEPTAL E/E' RATIO: 19 M/S
LYMPHOCYTES # BLD AUTO: 1.2 K/UL (ref 1–4.8)
LYMPHOCYTES # BLD AUTO: 1.3 K/UL (ref 1–4.8)
LYMPHOCYTES NFR BLD: 10.6 % (ref 18–48)
LYMPHOCYTES NFR BLD: 15.8 % (ref 18–48)
MAGNESIUM SERPL-MCNC: 1.7 MG/DL (ref 1.6–2.6)
MAGNESIUM SERPL-MCNC: 2 MG/DL (ref 1.6–2.6)
MCH RBC QN AUTO: 27.3 PG (ref 27–31)
MCH RBC QN AUTO: 27.6 PG (ref 27–31)
MCHC RBC AUTO-ENTMCNC: 31.5 G/DL (ref 32–36)
MCHC RBC AUTO-ENTMCNC: 31.6 G/DL (ref 32–36)
MCV RBC AUTO: 86 FL (ref 82–98)
MCV RBC AUTO: 88 FL (ref 82–98)
MONOCYTES # BLD AUTO: 0 K/UL (ref 0.3–1)
MONOCYTES # BLD AUTO: 0.2 K/UL (ref 0.3–1)
MONOCYTES NFR BLD: 0.4 % (ref 4–15)
MONOCYTES NFR BLD: 1.3 % (ref 4–15)
MV PEAK A VEL: 0.51 M/S
MV PEAK E VEL: 1.14 M/S
NEUTROPHILS # BLD AUTO: 10.7 K/UL (ref 1.8–7.7)
NEUTROPHILS # BLD AUTO: 6.1 K/UL (ref 1.8–7.7)
NEUTROPHILS NFR BLD: 82.9 % (ref 38–73)
NEUTROPHILS NFR BLD: 87.4 % (ref 38–73)
NONHDLC SERPL-MCNC: 148 MG/DL
NRBC BLD-RTO: 0 /100 WBC
NRBC BLD-RTO: 0 /100 WBC
PHOSPHATE SERPL-MCNC: 3.9 MG/DL (ref 2.7–4.5)
PISA TR MAX VEL: 3.42 M/S
PLATELET # BLD AUTO: 122 K/UL (ref 150–350)
PLATELET # BLD AUTO: 132 K/UL (ref 150–350)
PMV BLD AUTO: 11.2 FL (ref 9.2–12.9)
PMV BLD AUTO: 11.2 FL (ref 9.2–12.9)
POCT GLUCOSE: 117 MG/DL (ref 70–110)
POCT GLUCOSE: 142 MG/DL (ref 70–110)
POTASSIUM SERPL-SCNC: 4 MMOL/L (ref 3.5–5.1)
POTASSIUM SERPL-SCNC: 4.6 MMOL/L (ref 3.5–5.1)
PROT SERPL-MCNC: 6.2 G/DL (ref 6–8.4)
PROT SERPL-MCNC: 6.7 G/DL (ref 6–8.4)
RA MAJOR: 4.97 CM
RA PRESSURE: 3 MMHG
RA WIDTH: 3.48 CM
RBC # BLD AUTO: 3.92 M/UL (ref 4.6–6.2)
RBC # BLD AUTO: 4.25 M/UL (ref 4.6–6.2)
RIGHT VENTRICULAR END-DIASTOLIC DIMENSION: 2.76 CM
RV TISSUE DOPPLER FREE WALL SYSTOLIC VELOCITY 1 (APICAL 4 CHAMBER VIEW): 11.01 CM/S
SINUS: 4.1 CM
SODIUM SERPL-SCNC: 134 MMOL/L (ref 136–145)
SODIUM SERPL-SCNC: 136 MMOL/L (ref 136–145)
STJ: 3.51 CM
TDI LATERAL: 0.06 M/S
TDI SEPTAL: 0.06 M/S
TDI: 0.06 M/S
TR MAX PG: 47 MMHG
TRICUSPID ANNULAR PLANE SYSTOLIC EXCURSION: 2.54 CM
TRIGL SERPL-MCNC: 130 MG/DL (ref 30–150)
TROPONIN I SERPL DL<=0.01 NG/ML-MCNC: 15.74 NG/ML (ref 0–0.03)
TROPONIN I SERPL DL<=0.01 NG/ML-MCNC: 23.63 NG/ML (ref 0–0.03)
TSH SERPL DL<=0.005 MIU/L-ACNC: 1.02 UIU/ML (ref 0.4–4)
TV REST PULMONARY ARTERY PRESSURE: 50 MMHG
WBC # BLD AUTO: 12.25 K/UL (ref 3.9–12.7)
WBC # BLD AUTO: 7.41 K/UL (ref 3.9–12.7)

## 2020-12-13 PROCEDURE — 84100 ASSAY OF PHOSPHORUS: CPT

## 2020-12-13 PROCEDURE — 92929 PR STENT, ADD'L VESSEL: CPT | Mod: LC,,, | Performed by: INTERNAL MEDICINE

## 2020-12-13 PROCEDURE — 92978 PR IVUS, CORONARY, 1ST VESSEL: ICD-10-PCS | Mod: 26,LC,, | Performed by: INTERNAL MEDICINE

## 2020-12-13 PROCEDURE — C1760 CLOSURE DEV, VASC: HCPCS | Performed by: INTERNAL MEDICINE

## 2020-12-13 PROCEDURE — 93455 CORONARY ART/GRFT ANGIO S&I: CPT | Mod: 26,59,51, | Performed by: INTERNAL MEDICINE

## 2020-12-13 PROCEDURE — 85730 THROMBOPLASTIN TIME PARTIAL: CPT | Mod: 91

## 2020-12-13 PROCEDURE — 99152 PR MOD CONSCIOUS SEDATION, SAME PHYS, 5+ YRS, FIRST 15 MIN: ICD-10-PCS | Mod: ,,, | Performed by: INTERNAL MEDICINE

## 2020-12-13 PROCEDURE — 63600175 PHARM REV CODE 636 W HCPCS: Performed by: STUDENT IN AN ORGANIZED HEALTH CARE EDUCATION/TRAINING PROGRAM

## 2020-12-13 PROCEDURE — 85730 THROMBOPLASTIN TIME PARTIAL: CPT

## 2020-12-13 PROCEDURE — C1725 CATH, TRANSLUMIN NON-LASER: HCPCS | Performed by: INTERNAL MEDICINE

## 2020-12-13 PROCEDURE — 93010 ELECTROCARDIOGRAM REPORT: CPT | Mod: ,,, | Performed by: INTERNAL MEDICINE

## 2020-12-13 PROCEDURE — 63600175 PHARM REV CODE 636 W HCPCS: Performed by: INTERNAL MEDICINE

## 2020-12-13 PROCEDURE — 25000003 PHARM REV CODE 250: Performed by: STUDENT IN AN ORGANIZED HEALTH CARE EDUCATION/TRAINING PROGRAM

## 2020-12-13 PROCEDURE — S4991 NICOTINE PATCH NONLEGEND: HCPCS | Performed by: STUDENT IN AN ORGANIZED HEALTH CARE EDUCATION/TRAINING PROGRAM

## 2020-12-13 PROCEDURE — 80053 COMPREHEN METABOLIC PANEL: CPT | Mod: 91

## 2020-12-13 PROCEDURE — 93455 PR CATH PLACE/CORONARY ANGIO, IMG SUPER/INTERP, BYPASS ANGIO: ICD-10-PCS | Mod: 26,59,51, | Performed by: INTERNAL MEDICINE

## 2020-12-13 PROCEDURE — C9601 PERC DRUG-EL COR STENT BRAN: HCPCS | Mod: LC | Performed by: INTERNAL MEDICINE

## 2020-12-13 PROCEDURE — C9600 PERC DRUG-EL COR STENT SING: HCPCS | Mod: LC | Performed by: INTERNAL MEDICINE

## 2020-12-13 PROCEDURE — 27201423 OPTIME MED/SURG SUP & DEVICES STERILE SUPPLY: Performed by: INTERNAL MEDICINE

## 2020-12-13 PROCEDURE — 93005 ELECTROCARDIOGRAM TRACING: CPT

## 2020-12-13 PROCEDURE — 63600175 PHARM REV CODE 636 W HCPCS: Performed by: EMERGENCY MEDICINE

## 2020-12-13 PROCEDURE — 85347 COAGULATION TIME ACTIVATED: CPT | Performed by: INTERNAL MEDICINE

## 2020-12-13 PROCEDURE — 92978 ENDOLUMINL IVUS OCT C 1ST: CPT | Mod: 26,LC,, | Performed by: INTERNAL MEDICINE

## 2020-12-13 PROCEDURE — 99153 MOD SED SAME PHYS/QHP EA: CPT | Performed by: INTERNAL MEDICINE

## 2020-12-13 PROCEDURE — C1894 INTRO/SHEATH, NON-LASER: HCPCS | Performed by: INTERNAL MEDICINE

## 2020-12-13 PROCEDURE — 83036 HEMOGLOBIN GLYCOSYLATED A1C: CPT

## 2020-12-13 PROCEDURE — 25000003 PHARM REV CODE 250: Performed by: INTERNAL MEDICINE

## 2020-12-13 PROCEDURE — 83735 ASSAY OF MAGNESIUM: CPT | Mod: 91

## 2020-12-13 PROCEDURE — 20000000 HC ICU ROOM

## 2020-12-13 PROCEDURE — 85025 COMPLETE CBC W/AUTO DIFF WBC: CPT

## 2020-12-13 PROCEDURE — 83735 ASSAY OF MAGNESIUM: CPT

## 2020-12-13 PROCEDURE — 93455 CORONARY ART/GRFT ANGIO S&I: CPT | Mod: 59 | Performed by: INTERNAL MEDICINE

## 2020-12-13 PROCEDURE — 25500020 PHARM REV CODE 255: Performed by: INTERNAL MEDICINE

## 2020-12-13 PROCEDURE — 99152 MOD SED SAME PHYS/QHP 5/>YRS: CPT | Mod: ,,, | Performed by: INTERNAL MEDICINE

## 2020-12-13 PROCEDURE — C1753 CATH, INTRAVAS ULTRASOUND: HCPCS | Performed by: INTERNAL MEDICINE

## 2020-12-13 PROCEDURE — 92978 ENDOLUMINL IVUS OCT C 1ST: CPT | Mod: LC | Performed by: INTERNAL MEDICINE

## 2020-12-13 PROCEDURE — 84484 ASSAY OF TROPONIN QUANT: CPT

## 2020-12-13 PROCEDURE — C1769 GUIDE WIRE: HCPCS | Performed by: INTERNAL MEDICINE

## 2020-12-13 PROCEDURE — 99152 MOD SED SAME PHYS/QHP 5/>YRS: CPT | Performed by: INTERNAL MEDICINE

## 2020-12-13 PROCEDURE — C1887 CATHETER, GUIDING: HCPCS | Performed by: INTERNAL MEDICINE

## 2020-12-13 PROCEDURE — 80053 COMPREHEN METABOLIC PANEL: CPT

## 2020-12-13 PROCEDURE — 93010 EKG 12-LEAD: ICD-10-PCS | Mod: ,,, | Performed by: INTERNAL MEDICINE

## 2020-12-13 PROCEDURE — C1874 STENT, COATED/COV W/DEL SYS: HCPCS | Performed by: INTERNAL MEDICINE

## 2020-12-13 PROCEDURE — 99233 SBSQ HOSP IP/OBS HIGH 50: CPT | Mod: GC,,, | Performed by: INTERNAL MEDICINE

## 2020-12-13 PROCEDURE — 99233 PR SUBSEQUENT HOSPITAL CARE,LEVL III: ICD-10-PCS | Mod: GC,,, | Performed by: INTERNAL MEDICINE

## 2020-12-13 PROCEDURE — 92928 PR STENT: ICD-10-PCS | Mod: LC,,, | Performed by: INTERNAL MEDICINE

## 2020-12-13 PROCEDURE — 92928 PRQ TCAT PLMT NTRAC ST 1 LES: CPT | Mod: LC,,, | Performed by: INTERNAL MEDICINE

## 2020-12-13 PROCEDURE — 92929 PR STENT, ADD'L VESSEL: ICD-10-PCS | Mod: LC,,, | Performed by: INTERNAL MEDICINE

## 2020-12-13 DEVICE — STENT RONYX30022UX RESOLUTE ONYX 3.00X22
Type: IMPLANTABLE DEVICE | Site: HEART | Status: FUNCTIONAL
Brand: RESOLUTE ONYX™

## 2020-12-13 DEVICE — STENT RONYX35038UX RESOLUTE ONYX 3.50X38
Type: IMPLANTABLE DEVICE | Site: HEART | Status: FUNCTIONAL
Brand: RESOLUTE ONYX™

## 2020-12-13 DEVICE — STENT RONYX35018UX RESOLUTE ONYX 3.50X18
Type: IMPLANTABLE DEVICE | Site: HEART | Status: FUNCTIONAL
Brand: RESOLUTE ONYX™

## 2020-12-13 DEVICE — STENT RONYX30038UX RESOLUTE ONYX 3.00X38
Type: IMPLANTABLE DEVICE | Site: HEART | Status: FUNCTIONAL
Brand: RESOLUTE ONYX™

## 2020-12-13 DEVICE — STENT RONYX22518UX RESOLUTE ONYX 2.25X18
Type: IMPLANTABLE DEVICE | Site: HEART | Status: FUNCTIONAL
Brand: RESOLUTE ONYX™

## 2020-12-13 DEVICE — STENT RONYX22515UX RESOLUTE ONYX 2.25X15
Type: IMPLANTABLE DEVICE | Site: HEART | Status: FUNCTIONAL
Brand: RESOLUTE ONYX™

## 2020-12-13 DEVICE — STENT RONYX30015UX RESOLUTE ONYX 3.00X15
Type: IMPLANTABLE DEVICE | Site: HEART | Status: FUNCTIONAL
Brand: RESOLUTE ONYX™

## 2020-12-13 RX ORDER — CEFAZOLIN SODIUM 1 G/3ML
2 INJECTION, POWDER, FOR SOLUTION INTRAMUSCULAR; INTRAVENOUS
Status: COMPLETED | OUTPATIENT
Start: 2020-12-13 | End: 2020-12-14

## 2020-12-13 RX ORDER — HEPARIN SODIUM 5000 [USP'U]/ML
5000 INJECTION, SOLUTION INTRAVENOUS; SUBCUTANEOUS EVERY 8 HOURS
Status: DISCONTINUED | OUTPATIENT
Start: 2020-12-13 | End: 2020-12-15 | Stop reason: HOSPADM

## 2020-12-13 RX ORDER — SODIUM CHLORIDE 0.9 % (FLUSH) 0.9 %
10 SYRINGE (ML) INJECTION
Status: DISCONTINUED | OUTPATIENT
Start: 2020-12-13 | End: 2020-12-15 | Stop reason: HOSPADM

## 2020-12-13 RX ORDER — SODIUM CHLORIDE 9 MG/ML
INJECTION, SOLUTION INTRAVENOUS ONCE
Status: CANCELLED | OUTPATIENT
Start: 2020-12-13 | End: 2020-12-13

## 2020-12-13 RX ORDER — METHYLPREDNISOLONE SOD SUCC 125 MG
125 VIAL (EA) INJECTION ONCE
Status: COMPLETED | OUTPATIENT
Start: 2020-12-13 | End: 2020-12-13

## 2020-12-13 RX ORDER — FAMOTIDINE 10 MG/ML
20 INJECTION INTRAVENOUS DAILY
Status: DISCONTINUED | OUTPATIENT
Start: 2020-12-13 | End: 2020-12-15 | Stop reason: HOSPADM

## 2020-12-13 RX ORDER — FENTANYL CITRATE 50 UG/ML
INJECTION, SOLUTION INTRAMUSCULAR; INTRAVENOUS
Status: DISCONTINUED | OUTPATIENT
Start: 2020-12-13 | End: 2020-12-13 | Stop reason: HOSPADM

## 2020-12-13 RX ORDER — DIPHENHYDRAMINE HYDROCHLORIDE 50 MG/ML
50 INJECTION INTRAMUSCULAR; INTRAVENOUS ONCE
Status: COMPLETED | OUTPATIENT
Start: 2020-12-13 | End: 2020-12-13

## 2020-12-13 RX ORDER — CEFAZOLIN SODIUM 1 G/3ML
INJECTION, POWDER, FOR SOLUTION INTRAMUSCULAR; INTRAVENOUS
Status: DISCONTINUED | OUTPATIENT
Start: 2020-12-13 | End: 2020-12-13 | Stop reason: HOSPADM

## 2020-12-13 RX ORDER — HEPARIN SODIUM 1000 [USP'U]/ML
INJECTION, SOLUTION INTRAVENOUS; SUBCUTANEOUS
Status: DISCONTINUED | OUTPATIENT
Start: 2020-12-13 | End: 2020-12-13 | Stop reason: HOSPADM

## 2020-12-13 RX ORDER — MIDAZOLAM HYDROCHLORIDE 1 MG/ML
INJECTION, SOLUTION INTRAMUSCULAR; INTRAVENOUS
Status: DISCONTINUED | OUTPATIENT
Start: 2020-12-13 | End: 2020-12-13 | Stop reason: HOSPADM

## 2020-12-13 RX ORDER — ONDANSETRON 8 MG/1
8 TABLET, ORALLY DISINTEGRATING ORAL EVERY 8 HOURS PRN
Status: DISCONTINUED | OUTPATIENT
Start: 2020-12-13 | End: 2020-12-15 | Stop reason: HOSPADM

## 2020-12-13 RX ORDER — LIDOCAINE HYDROCHLORIDE 20 MG/ML
INJECTION, SOLUTION INFILTRATION; PERINEURAL
Status: DISCONTINUED | OUTPATIENT
Start: 2020-12-13 | End: 2020-12-13 | Stop reason: HOSPADM

## 2020-12-13 RX ADMIN — CEFAZOLIN 2 G: 1 INJECTION, POWDER, FOR SOLUTION INTRAMUSCULAR; INTRAVENOUS at 08:12

## 2020-12-13 RX ADMIN — Medication 100 MG: at 08:12

## 2020-12-13 RX ADMIN — AMLODIPINE BESYLATE 10 MG: 10 TABLET ORAL at 08:12

## 2020-12-13 RX ADMIN — CARVEDILOL 25 MG: 25 TABLET, FILM COATED ORAL at 08:12

## 2020-12-13 RX ADMIN — ASPIRIN 81 MG: 81 TABLET, COATED ORAL at 08:12

## 2020-12-13 RX ADMIN — NITROGLYCERIN 70 MCG/MIN: 20 INJECTION INTRAVENOUS at 08:12

## 2020-12-13 RX ADMIN — HEPARIN SODIUM 5000 UNITS: 5000 INJECTION INTRAVENOUS; SUBCUTANEOUS at 09:12

## 2020-12-13 RX ADMIN — NICOTINE 1 PATCH: 21 PATCH, EXTENDED RELEASE TRANSDERMAL at 08:12

## 2020-12-13 RX ADMIN — DIPHENHYDRAMINE HYDROCHLORIDE 50 MG: 50 INJECTION, SOLUTION INTRAMUSCULAR; INTRAVENOUS at 08:12

## 2020-12-13 RX ADMIN — TICAGRELOR 90 MG: 90 TABLET ORAL at 08:12

## 2020-12-13 RX ADMIN — FAMOTIDINE 20 MG: 10 INJECTION INTRAVENOUS at 08:12

## 2020-12-13 RX ADMIN — CLOPIDOGREL 75 MG: 75 TABLET, FILM COATED ORAL at 08:12

## 2020-12-13 RX ADMIN — CEFAZOLIN 2 G: 1 INJECTION, POWDER, FOR SOLUTION INTRAMUSCULAR; INTRAVENOUS at 01:12

## 2020-12-13 RX ADMIN — NITROGLYCERIN 60 MCG/MIN: 20 INJECTION INTRAVENOUS at 07:12

## 2020-12-13 RX ADMIN — ATORVASTATIN CALCIUM 80 MG: 20 TABLET, FILM COATED ORAL at 08:12

## 2020-12-13 RX ADMIN — HEPARIN SODIUM AND DEXTROSE 14 UNITS/KG/HR: 10000; 5 INJECTION INTRAVENOUS at 08:12

## 2020-12-13 RX ADMIN — FOLIC ACID 1 MG: 1 TABLET ORAL at 08:12

## 2020-12-13 RX ADMIN — METHYLPREDNISOLONE SODIUM SUCCINATE 125 MG: 125 INJECTION, POWDER, FOR SOLUTION INTRAMUSCULAR; INTRAVENOUS at 08:12

## 2020-12-13 RX ADMIN — SODIUM CHLORIDE 531.6 ML: 0.9 INJECTION, SOLUTION INTRAVENOUS at 01:12

## 2020-12-13 RX ADMIN — TICAGRELOR 180 MG: 90 TABLET ORAL at 02:12

## 2020-12-13 NOTE — PROGRESS NOTES
[facility]  Cardiology  Progress Note    Patient Name: Emanuel Daniel  MRN: [id number]  Admission Date: 12/12/2020  Hospital Length of Stay: 1 days  Code Status: Full Code   Attending Physician: James Levy MD   Primary Care Physician: Tom Diamond MD  Expected Discharge Date:   Principal Problem:NSTEMI (non-ST elevated myocardial infarction)    Subjective:   HPI: Mr. Daniel is a 64 y/o gentleman with a PMHx of CAD s/p 1V CABG (LIMA-LAD) s/p PCI to ostial LM and LCx/OM1, PAD s/p R EIA stent, HTN, HLD, smoking, alcohol abuse, CKD.  He presented to the ED earlier today complaining of chest pain which started earlier this morning >7/10.  Patient states occurred at rest and similar to anginal pain he has felt in the past prior to his PCIs and CABG.  He also has associated shortness of breath.  He denies any syncope, orthopnea, PND, or LE edema.  Patient was still having chest discomfort at the time of my evaluation; however significantly improved to 2/10 on nitro gtt @15.     He does smoke 1.5 packs per day. He missed 2 days worth of plavix because he ran out of medication at home.     Vitals show blood pressure is slightly elevated to the 150s mmHg systolic.  Initial ECG showed ST elevations not meeting STEMI criteria in leads V2 and V3 with inferolateral ST depressions.  Troponin elevated to 2.5.  Cr 3.6, HGB 12.0.    Hospital Course:   12/13/20 troponins continue to up trend and despite nitro drip patient had intractable anginal symptoms.  He was taken to the cath lab where he had PCI of OM1 superior and inferior branches with drug-eluting stent and successful PCI of mid and distal left circumflex with drug-eluting stent, and successful PCI the left circumflex OM1 bifurcation with drug-eluting stent.         Interval History:  Patient continue to have up trending troponins overnight and anginal symptoms despite nitro drip.  He was taking emergency to the cath lab for  PCI.    Review of Systems   Constitution: Negative for chills, fever and weight loss.   HENT: Negative for sore throat and stridor.    Eyes: Negative for blurred vision, double vision and pain.   Cardiovascular: Negative for chest pain, claudication, dyspnea on exertion, near-syncope, orthopnea, palpitations, paroxysmal nocturnal dyspnea and syncope.   Respiratory: Negative for cough, shortness of breath, sputum production and wheezing.    Endocrine: Negative for polydipsia, polyphagia and polyuria.   Skin: Negative for rash.   Musculoskeletal: Negative for joint pain, joint swelling and myalgias.   Gastrointestinal: Positive for abdominal pain and heartburn. Negative for constipation, diarrhea, melena, nausea and vomiting.   Genitourinary: Negative for dysuria and hematuria.   Neurological: Negative for focal weakness and loss of balance.   Psychiatric/Behavioral: Negative for depression and memory loss.     Objective:     Vital Signs (Most Recent):  Temp: 97.5 °F (36.4 °C) (12/13/20 1300)  Pulse: 78 (12/13/20 1330)  Resp: 18 (12/13/20 1330)  BP: (!) 171/83 (12/13/20 1330)  SpO2: (!) 87 % (12/13/20 1330) Vital Signs (24h Range):  Temp:  [97.5 °F (36.4 °C)-98.3 °F (36.8 °C)] 97.5 °F (36.4 °C)  Pulse:  [62-84] 78  Resp:  [11-22] 18  SpO2:  [87 %-100 %] 87 %  BP: (111-171)/() 171/83     Weight: 88.6 kg (195 lb 5.2 oz)  Body mass index is 26.49 kg/m².     SpO2: (!) 87 %  O2 Device (Oxygen Therapy): nasal cannula      Intake/Output Summary (Last 24 hours) at 12/13/2020 1349  Last data filed at 12/13/2020 1306  Gross per 24 hour   Intake 877.94 ml   Output 300 ml   Net 577.94 ml     Physical Exam   Constitutional: He is oriented to person, place, and time. He appears well-developed and well-nourished. He appears distressed.   HENT:   Head: Normocephalic.   Eyes: Pupils are equal, round, and reactive to light.   Neck: Normal range of motion. No JVD present.   Cardiovascular: Normal rate, regular rhythm and intact  distal pulses.   No murmur heard.  Pulmonary/Chest: Effort normal and breath sounds normal. No respiratory distress. He has no wheezes. He has no rales.   Abdominal: Soft. Bowel sounds are normal. He exhibits no distension. There is no abdominal tenderness.   Musculoskeletal: Normal range of motion.         General: No edema.   Neurological: He is alert and oriented to person, place, and time.   Skin: Skin is warm and dry.   Nursing note and vitals reviewed.      Significant Labs: troponin 2.5 >15 >23       Assessment and Plan:     * NSTEMI (non-ST elevated myocardial infarction)   Mr. Daniel who is a 65-year-old gentleman with a PMHx of CAD s/p 1V CABG (LIMA-LAD) s/p PCI to ostial LM and LCx/OM1, PAD s/p R EIA stent, HTN, HLD, smoking, alcohol abuse, CKD.  He is now admitted with an NSTEMI.  His initial ECG was concerning with ST elevations in his anterior leads that did not meet STEMI criteria as well as inferolateral ST depressions.  Symptoms improved initially with nitro drip, however patient began complaining of indigestion/abdominal pain concerning for atypical angina.  He was emergently taken to the cath lab for PCI [place] he had successful placement of drug-eluting stents in the OM 1 superior and inferior branch, mid and distal left circumflex, and left circumflex and OM1 bifurcation.      Plan:  -IV fluids at 1.5 cc/kg per hour for 4 hours  -Ancef IV 2 g Q 8 x 3 doses  -DC heparin drip  GDMT   -Loaded was at 180 mg of Brilinta, Brilinta 90 mg b.i.d. for the next year   -aspirin 81 mg   -Lipitor 80mg   -on losartan at home however due to contrast will hold for now and continue to evaluate kidney function   -Carvedilol 25mg BID  -obtain formal echo      Stage 4 chronic kidney disease  Baseline approximately 3, although unable to be certain as to worsening baseline over the last year.    Creatinine on admission 3.6.  Patient has 1 prior to PCI that with history of CKD he could have worsening renal function with  contrast.    Plan  -Continue to monitor creatinine  -Avoid nephrotoxic agents  -Strict I's and O's  -patient will need to follow-up with nephrology upon discharge.     HTN (hypertension)  Continue home meds: Coreg 25 bid, Amlodipine 10    Alcohol abuse  CIWA protocol  Started on Folic acid and thiamine    Smoker  1.5ppd  Nicotine patch      VTE Risk Mitigation (From admission, onward)         Ordered     heparin (porcine) injection 5,000 Units  Every 8 hours      12/13/20 1428     Place sequential compression device  Until discontinued      12/12/20 2001     IP VTE LOW RISK PATIENT  Once      12/12/20 2001              Jung Padilla DO  Cardiology  [facility]

## 2020-12-13 NOTE — NURSING
Pt sat up in bed abruptly at a 90 degree angle. Reminded to lay flat immediately. Site assessed, no new complications. Will continue to monitor closely per protocol.

## 2020-12-13 NOTE — NURSING
Notified Cards pt continuing to have intermittent runs of bradycardia - HR 30-40s, sustaining for 10-15 seconds at a time with patient feeling very SOB. AED pads applied to patient, EKG obtained though bradycardic rhythm not captured, CXR ordered.

## 2020-12-13 NOTE — BRIEF OP NOTE
Brief Post Cath Note     Referring Physician: Self,Aaareferral    Procedure: ANGIOGRAM, CORONARY ARTERY (N/A), Stent, Drug Eluting, Multi Vessel, Coronary, IVUS, Coronary, Bypass graft study    : Sandro Krause MD     All Operators: Surgeon(s):  MD Jose Raul Cadena MD     Preoperative Diagnosis: NSTEMI (non-ST elevated myocardial infarction) [I21.4]     Postop Diagnosis: NSTEMI (non-ST elevated myocardial infarction) [I21.4]    Procedure Details     Access: Left CFA    Findings: Occluded OM1 and Prox LCx with ISR    Intervention: Successful PCI of OM1 superior and inferior branch with 2.25 x 18 and 2.25 x 15 mm MICHELLE using DK crush, successful PCI of Mid and distal LCx with 3.0 x 15 mm, 3.0 x 38 mm, 3.5 x 38 mm MICHELLE, successful PCI of LCx and OM1 bifurcation with 3.5 x 18 and 3.0 x 22 mm MICHELLE    Estimated Blood loss: 20 cc    Specimens removed: No    Closure device: Mynx    See full report for further details    Post Cath Exam:     /74 (BP Location: Right arm, Patient Position: Lying)   Pulse 77   Temp 98.1 °F (36.7 °C) (Oral)   Resp 15   Ht 6' (1.829 m)   Wt 88.6 kg (195 lb 5.2 oz)   SpO2 97%   BMI 26.49 kg/m²     No unusual pain, hematoma, thrill or bruit at vascular access site.  Distal pulse present without signs of ischemia.    Recommendations:   - Routine post-cath care  - IVF at 1.5 cc/kg/hr for 4 hrs  - Ancef IV 2g Q8H x 3 doses  - Cardiac rehab referral  - Load ticagrelor 180 mg x 1  - Ticagrelor 90 mg BID for at least 1 year and ASA 81 mg indefinitely  - Follow-up with outpatient cardiologist    Signed:  Jose Raul Castaneda MD  Advanced Interventional Cardiology Fellow, PGY-8  12/13/2020 12:36 PM

## 2020-12-13 NOTE — ASSESSMENT & PLAN NOTE
Baseline approximately 3, although unable to be certain as to worsening baseline over the last year.    Creatinine on admission 3.6.  Patient has 1 prior to PCI that with history of CKD he could have worsening renal function with contrast.    Plan  -Continue to monitor creatinine  -Avoid nephrotoxic agents  -Strict I's and O's  -patient will need to follow-up with nephrology upon discharge.

## 2020-12-13 NOTE — NURSING
Troponin resulted at 23.6 this morning, patient reports chest pain is unchanged from 2/10, but additionally c/o indigestion. Dr. Leon notified, who will pass information onto interventional cardiology.

## 2020-12-13 NOTE — ASSESSMENT & PLAN NOTE
Mr. Daniel who is a 65-year-old gentleman with a PMHx of CAD s/p 1V CABG (LIMA-LAD) s/p PCI to ostial LM and LCx/OM1, PAD s/p R EIA stent, HTN, HLD, smoking, alcohol abuse, CKD.  He is now admitted with an NSTEMI.  His initial ECG was concerning with ST elevations in his anterior leads that did not meet STEMI criteria as well as inferolateral ST depressions.  These have slightly improved since initiation of an IV nitroglycerin infusion.  His chest pain is also nearly resolved after initiating IV nitroglycerin.  He has baseline chronic kidney disease and has a creatinine of 3.6 on admission as well as a contrast dye allergy.  Therefore recommend the following:    Plan:  - Continue IV nitroglycerin until chest pain-free, plan to call Interventional Cardiology immediately if patient has chest pain refractory to nitroglycerin  - Start ACS protocol, continue IV heparin and initiate dual antiplatelet therapy  - Admit for ACS management and need for IV nitroglycerin  - Continue to trend troponin to peak, repeat ECG if patient has recurrence of chest pain  - Obtain TTE  - I loaded the patient with IV Solu-Medrol in case patient decompensates overnight and needs emergent coronary angiogram in order to prep for his dye allergy  - Patient will be at high risk for renal failure given his CKD if the if coronary angiogram will be necessary and this was discussed with the patient  - NPO  - Interventional cardiology will continue to monitor

## 2020-12-13 NOTE — HOSPITAL COURSE
12/13/20 troponins continue to up trend and despite nitro drip patient had intractable anginal symptoms. 12/14  was taken to the cath lab where he had PCI of OM1 superior and inferior branches with drug-eluting stent and successful PCI of mid and distal left circumflex with drug-eluting stent, and successful PCI the left circumflex OM1 bifurcation with drug-eluting stent.  He was monitored overnight and discharged home on 12/15/20.  He will follow-up with his primary care physician Dr RICK Argueta who will assure that the patient has nephrology follow-up.

## 2020-12-13 NOTE — ASSESSMENT & PLAN NOTE
Briefly this is Mr. Daniel who is a 65-year-old gentleman with a PMHx of CAD s/p 1V CABG (LIMA-LAD) s/p PCI to ostial LM and LCx/OM1, PAD s/p R EIA stent, HTN, HLD, smoking, alcohol abuse, CKD.  He is now admitted with an NSTEMI.  His initial ECG was concerning with ST elevations in his anterior leads that did not meet STEMI criteria as well as inferolateral ST depressions.  These have slightly improved since initiation of an IV nitroglycerin infusion.  His chest pain is also nearly resolved after initiating IV nitroglycerin.  He has baseline chronic kidney disease and has a creatinine of 3.6 on admission as well as a contrast dye allergy.  Therefore recommend the following:    - Admit to the CCU for ACS and need for IV nitroglycerin  - Continue IV nitroglycerin until chest pain-free, please call Interventional Cardiology immediately if patient has chest pain refractory to nitroglycerin  - Start ACS protocol, continue IV heparin and initiate dual antiplatelet therapy  - Continue to trend troponin to peak, repeat ECG if patient has recurrence of chest pain  - Obtain TTE  - I loaded the patient with IV Solu-Medrol in case patient decompensates overnight and needs emergent coronary angiogram in order to prep for his dye allergy  - Patient will be at high risk for renal failure given his CKD if the if coronary angiogram will be necessary and this was discussed with the patient  - Interventional cardiology will continue to monitor

## 2020-12-13 NOTE — SUBJECTIVE & OBJECTIVE
Interval History:  Patient continue to have up trending troponins overnight and anginal symptoms despite nitro drip.  He was taking emergency to the cath lab for PCI.    Review of Systems   Constitution: Negative for chills, fever and weight loss.   HENT: Negative for sore throat and stridor.    Eyes: Negative for blurred vision, double vision and pain.   Cardiovascular: Negative for chest pain, claudication, dyspnea on exertion, near-syncope, orthopnea, palpitations, paroxysmal nocturnal dyspnea and syncope.   Respiratory: Negative for cough, shortness of breath, sputum production and wheezing.    Endocrine: Negative for polydipsia, polyphagia and polyuria.   Skin: Negative for rash.   Musculoskeletal: Negative for joint pain, joint swelling and myalgias.   Gastrointestinal: Positive for abdominal pain and heartburn. Negative for constipation, diarrhea, melena, nausea and vomiting.   Genitourinary: Negative for dysuria and hematuria.   Neurological: Negative for focal weakness and loss of balance.   Psychiatric/Behavioral: Negative for depression and memory loss.     Objective:     Vital Signs (Most Recent):  Temp: 97.5 °F (36.4 °C) (12/13/20 1300)  Pulse: 78 (12/13/20 1330)  Resp: 18 (12/13/20 1330)  BP: (!) 171/83 (12/13/20 1330)  SpO2: (!) 87 % (12/13/20 1330) Vital Signs (24h Range):  Temp:  [97.5 °F (36.4 °C)-98.3 °F (36.8 °C)] 97.5 °F (36.4 °C)  Pulse:  [62-84] 78  Resp:  [11-22] 18  SpO2:  [87 %-100 %] 87 %  BP: (111-171)/() 171/83     Weight: 88.6 kg (195 lb 5.2 oz)  Body mass index is 26.49 kg/m².     SpO2: (!) 87 %  O2 Device (Oxygen Therapy): nasal cannula      Intake/Output Summary (Last 24 hours) at 12/13/2020 1349  Last data filed at 12/13/2020 1306  Gross per 24 hour   Intake 877.94 ml   Output 300 ml   Net 577.94 ml     Physical Exam   Constitutional: He is oriented to person, place, and time. He appears well-developed and well-nourished. He appears distressed.   HENT:   Head: Normocephalic.    Eyes: Pupils are equal, round, and reactive to light.   Neck: Normal range of motion. No JVD present.   Cardiovascular: Normal rate, regular rhythm and intact distal pulses.   No murmur heard.  Pulmonary/Chest: Effort normal and breath sounds normal. No respiratory distress. He has no wheezes. He has no rales.   Abdominal: Soft. Bowel sounds are normal. He exhibits no distension. There is no abdominal tenderness.   Musculoskeletal: Normal range of motion.         General: No edema.   Neurological: He is alert and oriented to person, place, and time.   Skin: Skin is warm and dry.   Nursing note and vitals reviewed.      Significant Labs: troponin 2.5 >15 >23

## 2020-12-13 NOTE — SUBJECTIVE & OBJECTIVE
Past Medical History:   Diagnosis Date    Alcohol abuse     Angina of effort     Arthritis     CAD (coronary artery disease)     Carotid artery stenosis, symptomatic 12/30/2014    Coronary artery disease     Gout     Hernia     HTN (hypertension)     Hyperlipidemia     Myocardial infarct, old     Smoker active       Past Surgical History:   Procedure Laterality Date    ANGIOPLASTY      CARDIAC CATHETERIZATION      CARDIAC CATHETERIZATION      CORONARY ANGIOPLASTY      CORONARY ARTERY BYPASS GRAFT      FLUOROSCOPIC ANGIOGRAPHY OF LOWER EXTREMITY WITH TOPICAL ULTRASOUND Right 1/2/2019    Procedure: ARTERIOGRAM-LEG AND ULTRASOUND;  Surgeon: GINA Vuong III, MD;  Location: [facility] OR [address] [place] [address];  Service: Peripheral Vascular;  Laterality: Right;  5.1 min  753.92 mGy  8ml Dye  330ml CO2  6ml Local    HEMORRHOID SURGERY      PERCUTANEOUS TRANSLUMINAL ANGIOPLASTY N/A 1/2/2019    Procedure: PTA (ANGIOPLASTY, PERCUTANEOUS, TRANSLUMINAL);  Surgeon: GINA Vuong III, MD;  Location: [facility] OR [address] [place] [address];  Service: Peripheral Vascular;  Laterality: N/A;    TONSILLECTOMY         Review of patient's allergies indicates:   Allergen Reactions    Iodine and iodide containing products      Anaphylactic rxn        No current facility-administered medications on file prior to encounter.      Current Outpatient Medications on File Prior to Encounter   Medication Sig    allopurinol (ZYLOPRIM) 100 MG tablet Take 100 mg by mouth once daily.     amLODIPine (NORVASC) 10 MG tablet amlodipine 10 mg tablet q HS    aspirin (ECOTRIN) 81 MG EC tablet Take 1 tablet (81 mg total) by mouth once daily.    carvediloL (COREG) 25 MG tablet carvedilol 25 mg tablet TWO TIMES A DAY    clopidogreL (PLAVIX) 75 mg tablet clopidogrel 75 mg tablet daily    colchicine (COLCRYS) 0.6 mg tablet     losartan (COZAAR) 25 MG tablet Take 25 mg by mouth once daily.    nitroGLYCERIN (NITROSTAT) 0.4 MG SL tablet nitroglycerin 0.4 mg sublingual  tablet    rosuvastatin (CRESTOR) 40 MG Tab Take 1 tablet (40 mg total) by mouth every evening.    sodium zirconium cyclosilicate (LOKELMA) 5 gram packet Lokelma 5 gram oral powder packet     Family History     Problem Relation (Age of Onset)    Clotting disorder Sister    Heart attack Father    Heart disease Father    Heart failure Father    Hypertension Father, Sister, Paternal Grandfather    No Known Problems Mother, Brother, Maternal Grandmother, Maternal Grandfather, Paternal Grandmother, Maternal Aunt, Maternal Uncle, Paternal Aunt, Paternal Uncle    Stroke Sister        Tobacco Use    Smoking status: Current Every Day Smoker     Packs/day: 1.00    Smokeless tobacco: Former User   Substance and Sexual Activity    Alcohol use: Not Currently     Alcohol/week: 0.0 standard drinks     Comment: 3-4 cans of beer a day    Drug use: No    Sexual activity: Not on file     Review of Systems   Constitution: Negative for chills, fever and weight loss.   HENT: Negative for sore throat and stridor.    Eyes: Negative for blurred vision, double vision and pain.   Cardiovascular: Positive for chest pain. Negative for claudication, dyspnea on exertion, near-syncope, orthopnea, palpitations, paroxysmal nocturnal dyspnea and syncope.   Respiratory: Positive for shortness of breath. Negative for cough, sputum production and wheezing.    Endocrine: Negative for polydipsia, polyphagia and polyuria.   Skin: Negative for rash.   Musculoskeletal: Negative for joint pain, joint swelling and myalgias.   Gastrointestinal: Negative for abdominal pain, constipation, diarrhea, heartburn, melena, nausea and vomiting.   Genitourinary: Negative for dysuria and hematuria.   Neurological: Negative for focal weakness and loss of balance.   Psychiatric/Behavioral: Negative for depression and memory loss.     Objective:     Vital Signs (Most Recent):  Temp: 98.3 °F (36.8 °C) (12/12/20 1451)  Pulse: 65 (12/12/20 1946)  Resp: 15 (12/12/20  1946)  BP: (!) 152/72 (12/12/20 1946)  SpO2: 97 % (12/12/20 1946) Vital Signs (24h Range):  Temp:  [98.3 °F (36.8 °C)] 98.3 °F (36.8 °C)  Pulse:  [62-66] 65  Resp:  [15-18] 15  SpO2:  [96 %-100 %] 97 %  BP: (149-155)/() 152/72     Weight: 86.2 kg (190 lb)  Body mass index is 25.77 kg/m².    SpO2: 97 %       No intake or output data in the 24 hours ending 12/12/20 2013    Lines/Drains/Airways     Peripheral Intravenous Line                 Peripheral IV - Single Lumen 12/12/20 1514 18 G Left Hand less than 1 day                Physical Exam   Constitutional: He is oriented to person, place, and time. He appears well-developed and well-nourished.   HENT:   Head: Normocephalic.   Eyes: Pupils are equal, round, and reactive to light.   Neck: Normal range of motion. No JVD present.   Cardiovascular: Normal rate and regular rhythm.   No murmur heard.  Pulses:       Radial pulses are 2+ on the right side and 2+ on the left side.        Dorsalis pedis pulses are 2+ on the right side and 2+ on the left side.        Posterior tibial pulses are 2+ on the right side and 2+ on the left side.   Pulmonary/Chest: Effort normal and breath sounds normal. No respiratory distress. He has no wheezes. He has no rales.   Abdominal: Soft. Bowel sounds are normal. He exhibits no distension. There is no abdominal tenderness.   Musculoskeletal: Normal range of motion.         General: No edema.   Neurological: He is alert and oriented to person, place, and time.   Skin: Skin is warm and dry.   Nursing note and vitals reviewed.      Significant Labs: All pertinent lab results from the last 24 hours have been reviewed.

## 2020-12-13 NOTE — NURSING
Pt returned to floor from L heart cath, drowsy but oriented requiring 4L nc. HOB flat. Cardiac rhythm NS HR 80s. Limb/activity precautions reviewed with patient. IV fluids infusing. Left fem sight soft and normal color without complication. Vitals and femoral site monitoring per protocol. WCM.

## 2020-12-13 NOTE — PLAN OF CARE
CMICU DAILY GOALS       A: Awake    RASS: Goal - RASS Goal: 0-->alert and calm  Actual - RASS (March Agitation-Sedation Scale): 0-->alert and calm   Restraint necessity:    B: Breath   SBT: Not intubated   C: Coordinate A & B, analgesics/sedatives   Pain: managed    SAT: Not intubated  D: Delirium   CAM-ICU: Overall CAM-ICU: Negative  E: Early(intubated/ Progressive (non-intubated) Mobility   MOVE Screen: Pass   Activity: Activity Management: patient unable to perform activities  FAS: Feeding/Nutrition   Diet order: Diet/Nutrition Received: 2 gram sodium, low saturated fat/low cholesterol,   Fluid restriction:    T: Thrombus   DVT prophylaxis: VTE Required Core Measure: Pharmacological prophylaxis initiated/maintained  H: HOB Elevation   Head of Bed (HOB): HOB at 60 degrees  U: Ulcer Prophylaxis   GI: yes  G: Glucose control   managed    S: Skin   Bundle compliance: yes     Date: 12/13/20 AM Shift  B: Bowel Function   no issues   I: Indwelling Catheters   [place] necessity:     CVC necessity: No   IPAD offered: No  D: De-escalation Antibx   N/A  Plan for the day   Pt had 8 stents placed during emergent L heart cath today. Post-op patient had intermittent episodes of symptomatic bradycardia (HR 30s-40s) with c/o SOB. CXR obtained. VSS currently stable. Neuro intact. No complications to Left femoral site.   Family/Goals of care/Code Status   Code Status: Full Code     No acute events throughout day, VS and assessment per flow sheet, patient progressing towards goals as tolerated, plan of care reviewed with Emanuel Daniel and family, all concerns addressed, will continue to monitor.

## 2020-12-13 NOTE — ASSESSMENT & PLAN NOTE
Mr. Daniel who is a 65-year-old gentleman with a PMHx of CAD s/p 1V CABG (LIMA-LAD) s/p PCI to ostial LM and LCx/OM1, PAD s/p R EIA stent, HTN, HLD, smoking, alcohol abuse, CKD.  He is now admitted with an NSTEMI.  His initial ECG was concerning with ST elevations in his anterior leads that did not meet STEMI criteria as well as inferolateral ST depressions.  Symptoms improved initially with nitro drip, however patient began complaining of indigestion/abdominal pain concerning for atypical angina.  He was emergently taken to the cath lab for PCI Silva he had successful placement of drug-eluting stents in the OM 1 superior and inferior branch, mid and distal left circumflex, and left circumflex and OM1 bifurcation.      Plan:  -IV fluids at 1.5 cc/kg per hour for 4 hours  -Ancef IV 2 g Q 8 x 3 doses  -DC heparin drip  GDMT   -Loaded was at 180 mg of Brilinta, Brilinta 90 mg b.i.d. for the next year   -aspirin 81 mg   -Lipitor 80mg   -on losartan at home however due to contrast will hold for now and continue to evaluate kidney function   -Carvedilol 25mg BID  -obtain formal echo

## 2020-12-13 NOTE — CONSULTS
[facility]  Cardiology  Consult Note    Patient Name: Emanuel Daniel  MRN: [id number]  Admission Date: 12/12/2020  Hospital Length of Stay: 0 days  Code Status: Prior   Attending Provider: No att. providers found   Consulting Provider: Jose Raul Castaneda MD  Primary Care Physician: Tom Diamond MD  Principal Problem:NSTEMI (non-ST elevated myocardial infarction)    Patient information was obtained from patient and past medical records.     Inpatient consult to Cardiology  Consult performed by: Jose Raul Castaneda MD  Consult ordered by: Maycol Haddad MD        Subjective:     HPI:   Mr. Daniel is a 62 y/o gentleman with a PMHx of CAD s/p 1V CABG (LIMA-LAD) s/p PCI to ostial LM and LCx/OM1, PAD s/p R EIA stent, HTN, HLD, smoking, alcohol abuse, CKD.  He presents to the ED earlier tonight complaining of chest pain which started earlier this morning.  Patient states occurred at rest and similar to anginal pain he has felt in the past prior to his PCIs and CABG.  He also has associated shortness of breath.  He denies any syncope, orthopnea, PND, or LE edema.  Patient was still having chest discomfort at the time of my evaluation and he was started on an IV nitroglycerin infusion.    Still smoking.    Vitals show blood pressure is slightly elevated to the 150s mmHg systolic.  Initial ECG showed ST elevations not meeting STEMI criteria in leads V2 and V3 with inferolateral ST depressions.  Troponin elevated to 2.5.  Cr 3.6, HGB 12.0.    Past Medical History:   Diagnosis Date    Alcohol abuse     Angina of effort     Arthritis     CAD (coronary artery disease)     Carotid artery stenosis, symptomatic 12/30/2014    Coronary artery disease     Gout     Hernia     HTN (hypertension)     Hyperlipidemia     Myocardial infarct, old     Smoker active     Past Surgical History:   Procedure Laterality Date    ANGIOPLASTY      CARDIAC CATHETERIZATION      CARDIAC  CATHETERIZATION      CORONARY ANGIOPLASTY      CORONARY ARTERY BYPASS GRAFT      FLUOROSCOPIC ANGIOGRAPHY OF LOWER EXTREMITY WITH TOPICAL ULTRASOUND Right 1/2/2019    Procedure: ARTERIOGRAM-LEG AND ULTRASOUND;  Surgeon: GINA Vuong III, MD;  Location: [facility] OR [address] [place] [address];  Service: Peripheral Vascular;  Laterality: Right;  5.1 min  753.92 mGy  8ml Dye  330ml CO2  6ml Local    HEMORRHOID SURGERY      PERCUTANEOUS TRANSLUMINAL ANGIOPLASTY N/A 1/2/2019    Procedure: PTA (ANGIOPLASTY, PERCUTANEOUS, TRANSLUMINAL);  Surgeon: GINA Vuong III, MD;  Location: [address];  Service: Peripheral Vascular;  Laterality: N/A;    TONSILLECTOMY       Review of patient's allergies indicates:   Allergen Reactions    Iodine and iodide containing products      Anaphylactic rxn      No current facility-administered medications on file prior to encounter.      Current Outpatient Medications on File Prior to Encounter   Medication Sig    allopurinol (ZYLOPRIM) 100 MG tablet Take 100 mg by mouth once daily.     amLODIPine (NORVASC) 10 MG tablet amlodipine 10 mg tablet q HS    aspirin (ECOTRIN) 81 MG EC tablet Take 1 tablet (81 mg total) by mouth once daily.    carvediloL (COREG) 25 MG tablet carvedilol 25 mg tablet TWO TIMES A DAY    clopidogreL (PLAVIX) 75 mg tablet clopidogrel 75 mg tablet daily    colchicine (COLCRYS) 0.6 mg tablet     losartan (COZAAR) 25 MG tablet Take 25 mg by mouth once daily.    nitroGLYCERIN (NITROSTAT) 0.4 MG SL tablet nitroglycerin 0.4 mg sublingual tablet    rosuvastatin (CRESTOR) 40 MG Tab Take 1 tablet (40 mg total) by mouth every evening.    sodium zirconium cyclosilicate (LOKELMA) 5 gram packet Lokelma 5 gram oral powder packet     Family History     Problem Relation (Age of Onset)    Clotting disorder Sister    Heart attack Father    Heart disease Father    Heart failure Father    Hypertension Father, Sister, Paternal Grandfather    No Known Problems Mother, Brother, Maternal  Grandmother, Maternal Grandfather, Paternal Grandmother, Maternal Aunt, Maternal Uncle, Paternal Aunt, Paternal Uncle    Stroke Sister        Tobacco Use    Smoking status: Current Every Day Smoker     Packs/day: 1.00    Smokeless tobacco: Former User   Substance and Sexual Activity    Alcohol use: Not Currently     Alcohol/week: 0.0 standard drinks     Comment: 3-4 cans of beer a day    Drug use: No    Sexual activity: Not on file     Review of Systems   Constitution: Negative for chills, fever and weight loss.   HENT: Negative for sore throat and stridor.    Eyes: Negative for blurred vision, double vision and pain.   Cardiovascular: Positive for chest pain. Negative for claudication, dyspnea on exertion, near-syncope, orthopnea, palpitations, paroxysmal nocturnal dyspnea and syncope.   Respiratory: Positive for shortness of breath. Negative for cough, sputum production and wheezing.    Endocrine: Negative for polydipsia, polyphagia and polyuria.   Skin: Negative for rash.   Musculoskeletal: Negative for joint pain, joint swelling and myalgias.   Gastrointestinal: Negative for abdominal pain, constipation, diarrhea, heartburn, melena, nausea and vomiting.   Genitourinary: Negative for dysuria and hematuria.   Neurological: Negative for focal weakness and loss of balance.   Psychiatric/Behavioral: Negative for depression and memory loss.     Objective:     Vital Signs (Most Recent):  Temp: 98.3 °F (36.8 °C) (12/12/20 1451)  Pulse: 64 (12/12/20 1901)  Resp: 16 (12/12/20 1900)  BP: (!) 149/70 (12/12/20 1901)  SpO2: 96 % (12/12/20 1901) Vital Signs (24h Range):  Temp:  [98.3 °F (36.8 °C)] 98.3 °F (36.8 °C)  Pulse:  [62-66] 64  Resp:  [16-18] 16  SpO2:  [96 %-100 %] 96 %  BP: (149-155)/() 149/70     Weight: 86.2 kg (190 lb)  Body mass index is 25.77 kg/m².  SpO2: 96 %     No intake or output data in the 24 hours ending 12/12/20 1932    Lines/Drains/Airways     Peripheral Intravenous Line                  Peripheral IV - Single Lumen 12/12/20 1514 18 G Left Hand less than 1 day              Physical Exam   Constitutional: He is oriented to person, place, and time. He appears well-developed and well-nourished.   HENT:   Head: Normocephalic.   Eyes: Pupils are equal, round, and reactive to light.   Neck: Normal range of motion. No JVD present.   Cardiovascular: Normal rate and regular rhythm.   No murmur heard.  Pulses:       Radial pulses are 2+ on the right side and 2+ on the left side.        Dorsalis pedis pulses are 2+ on the right side and 2+ on the left side.        Posterior tibial pulses are 2+ on the right side and 2+ on the left side.   Pulmonary/Chest: Effort normal and breath sounds normal. No respiratory distress. He has no wheezes. He has no rales.   Abdominal: Soft. Bowel sounds are normal. He exhibits no distension. There is no abdominal tenderness.   Musculoskeletal: Normal range of motion.         General: No edema.   Neurological: He is alert and oriented to person, place, and time.   Skin: Skin is warm and dry.     Significant Labs:   CMP   Recent Labs   Lab 12/12/20  1516      K 4.6      CO2 23   *   BUN 29*   CREATININE 3.6*   CALCIUM 9.5   PROT 7.1   ALBUMIN 3.5   BILITOT 0.4   ALKPHOS 149*   AST 12   ALT 8*   ANIONGAP 9   ESTGFRAFRICA 19.5*   EGFRNONAA 16.8*   , CBC   Recent Labs   Lab 12/12/20  1516   WBC 6.45   HGB 12.0*   HCT 37.9*   *    and Troponin   Recent Labs   Lab 12/12/20  1516 12/12/20  1753   TROPONINI 0.594* 2.545*     Assessment and Plan:     * NSTEMI (non-ST elevated myocardial infarction)  Briefly this is Mr. Daniel who is a 65-year-old gentleman with a PMHx of CAD s/p 1V CABG (LIMA-LAD) s/p PCI to ostial LM and LCx/OM1, PAD s/p R EIA stent, HTN, HLD, smoking, alcohol abuse, CKD.  He is now admitted with an NSTEMI.  His initial ECG was concerning with ST elevations in his anterior leads that did not meet STEMI criteria as well as inferolateral ST  depressions.  These have slightly improved since initiation of an IV nitroglycerin infusion.  His chest pain is also nearly resolved after initiating IV nitroglycerin.  He has baseline chronic kidney disease and has a creatinine of 3.6 on admission as well as a contrast dye allergy.  Therefore recommend the following:    - Admit to the CCU for ACS and need for IV nitroglycerin  - Continue IV nitroglycerin until chest pain-free, please call Interventional Cardiology immediately if patient has chest pain refractory to nitroglycerin  - Start ACS protocol, continue IV heparin and initiate dual antiplatelet therapy  - Continue to trend troponin to peak, repeat ECG if patient has recurrence of chest pain  - Obtain TTE  - I loaded the patient with IV Solu-Medrol in case patient decompensates overnight and needs emergent coronary angiogram in order to prep for his dye allergy  - Patient will be at high risk for renal failure given his CKD if the if coronary angiogram will be necessary and this was discussed with the patient  - Interventional cardiology will continue to monitor    Jose Raul Castaneda MD  Interventional Cardiology   [facility]

## 2020-12-13 NOTE — CARE UPDATE
Case discussed with Dr. Krause and Dr. Castaneda    Given increasing troponin 2.5 >15 >23 and worsening indigestion/ epigastric pain will be activating cath lab for further evaluation and intervention as necessary.    Patient received 125 Solumedrol x1 yest at 330pm. Will medicate now prior to cath with second dose of 125 Solumedrol, IV Benadryl 50 and Pepsidd.     Viviana Leon MD PGY IV  Cardiology  Pager: [phone]  [facility]

## 2020-12-13 NOTE — NURSING
Patient reports that indigestion/chest pain is getting worse. NTG gtt titrated, Dr. Leon notified.

## 2020-12-13 NOTE — H&P
[facility]  Cardiology  History and Physical     Patient Name: Emanuel Daniel  MRN: [id number]  Admission Date: 12/12/2020  Code Status: Full Code   Attending Provider: No att. providers found   Primary Care Physician: Tom Diamond MD  Principal Problem:NSTEMI (non-ST elevated myocardial infarction)    Patient information was obtained from patient, past medical records and ER records.     Subjective:     Chief Complaint:  Chest pain    HPI:  Mr. Daniel is a 64 y/o gentleman with a PMHx of CAD s/p 1V CABG (LIMA-LAD) s/p PCI to ostial LM and LCx/OM1, PAD s/p R EIA stent, HTN, HLD, smoking, alcohol abuse, CKD.  He presented to the ED earlier today complaining of chest pain which started earlier this morning >7/10.  Patient states occurred at rest and similar to anginal pain he has felt in the past prior to his PCIs and CABG.  He also has associated shortness of breath.  He denies any syncope, orthopnea, PND, or LE edema.  Patient was still having chest discomfort at the time of my evaluation; however significantly improved to 2/10 on nitro gtt @15.    He does smoke 1.5 packs per day. He missed 2 days worth of plavix because he ran out of medication at home.    Vitals show blood pressure is slightly elevated to the 150s mmHg systolic.  Initial ECG showed ST elevations not meeting STEMI criteria in leads V2 and V3 with inferolateral ST depressions.  Troponin elevated to 2.5.  Cr 3.6, HGB 12.0.    Past Medical History:   Diagnosis Date    Alcohol abuse     Angina of effort     Arthritis     CAD (coronary artery disease)     Carotid artery stenosis, symptomatic 12/30/2014    Coronary artery disease     Gout     Hernia     HTN (hypertension)     Hyperlipidemia     Myocardial infarct, old     Smoker active       Past Surgical History:   Procedure Laterality Date    ANGIOPLASTY      CARDIAC CATHETERIZATION      CARDIAC CATHETERIZATION      CORONARY ANGIOPLASTY      CORONARY ARTERY  BYPASS GRAFT      FLUOROSCOPIC ANGIOGRAPHY OF LOWER EXTREMITY WITH TOPICAL ULTRASOUND Right 1/2/2019    Procedure: ARTERIOGRAM-LEG AND ULTRASOUND;  Surgeon: GINA Vuong III, MD;  Location: [facility] OR [address] [place] [address];  Service: Peripheral Vascular;  Laterality: Right;  5.1 min  753.92 mGy  8ml Dye  330ml CO2  6ml Local    HEMORRHOID SURGERY      PERCUTANEOUS TRANSLUMINAL ANGIOPLASTY N/A 1/2/2019    Procedure: PTA (ANGIOPLASTY, PERCUTANEOUS, TRANSLUMINAL);  Surgeon: GINA Vuong III, MD;  Location: [facility] OR [address] [place] [address];  Service: Peripheral Vascular;  Laterality: N/A;    TONSILLECTOMY         Review of patient's allergies indicates:   Allergen Reactions    Iodine and iodide containing products      Anaphylactic rxn        No current facility-administered medications on file prior to encounter.      Current Outpatient Medications on File Prior to Encounter   Medication Sig    allopurinol (ZYLOPRIM) 100 MG tablet Take 100 mg by mouth once daily.     amLODIPine (NORVASC) 10 MG tablet amlodipine 10 mg tablet q HS    aspirin (ECOTRIN) 81 MG EC tablet Take 1 tablet (81 mg total) by mouth once daily.    carvediloL (COREG) 25 MG tablet carvedilol 25 mg tablet TWO TIMES A DAY    clopidogreL (PLAVIX) 75 mg tablet clopidogrel 75 mg tablet daily    colchicine (COLCRYS) 0.6 mg tablet     losartan (COZAAR) 25 MG tablet Take 25 mg by mouth once daily.    nitroGLYCERIN (NITROSTAT) 0.4 MG SL tablet nitroglycerin 0.4 mg sublingual tablet    rosuvastatin (CRESTOR) 40 MG Tab Take 1 tablet (40 mg total) by mouth every evening.    sodium zirconium cyclosilicate (LOKELMA) 5 gram packet Lokelma 5 gram oral powder packet     Family History     Problem Relation (Age of Onset)    Clotting disorder Sister    Heart attack Father    Heart disease Father    Heart failure Father    Hypertension Father, Sister, Paternal Grandfather    No Known Problems Mother, Brother, Maternal Grandmother, Maternal Grandfather, Paternal Grandmother, Maternal  Aunt, Maternal Uncle, Paternal Aunt, Paternal Uncle    Stroke Sister        Tobacco Use    Smoking status: Current Every Day Smoker     Packs/day: 1.00    Smokeless tobacco: Former User   Substance and Sexual Activity    Alcohol use: Not Currently     Alcohol/week: 0.0 standard drinks     Comment: 3-4 cans of beer a day    Drug use: No    Sexual activity: Not on file     Review of Systems   Constitution: Negative for chills, fever and weight loss.   HENT: Negative for sore throat and stridor.    Eyes: Negative for blurred vision, double vision and pain.   Cardiovascular: Positive for chest pain. Negative for claudication, dyspnea on exertion, near-syncope, orthopnea, palpitations, paroxysmal nocturnal dyspnea and syncope.   Respiratory: Positive for shortness of breath. Negative for cough, sputum production and wheezing.    Endocrine: Negative for polydipsia, polyphagia and polyuria.   Skin: Negative for rash.   Musculoskeletal: Negative for joint pain, joint swelling and myalgias.   Gastrointestinal: Negative for abdominal pain, constipation, diarrhea, heartburn, melena, nausea and vomiting.   Genitourinary: Negative for dysuria and hematuria.   Neurological: Negative for focal weakness and loss of balance.   Psychiatric/Behavioral: Negative for depression and memory loss.     Objective:     Vital Signs (Most Recent):  Temp: 98.3 °F (36.8 °C) (12/12/20 1451)  Pulse: 65 (12/12/20 1946)  Resp: 15 (12/12/20 1946)  BP: (!) 152/72 (12/12/20 1946)  SpO2: 97 % (12/12/20 1946) Vital Signs (24h Range):  Temp:  [98.3 °F (36.8 °C)] 98.3 °F (36.8 °C)  Pulse:  [62-66] 65  Resp:  [15-18] 15  SpO2:  [96 %-100 %] 97 %  BP: (149-155)/() 152/72     Weight: 86.2 kg (190 lb)  Body mass index is 25.77 kg/m².    SpO2: 97 %       No intake or output data in the 24 hours ending 12/12/20 2013    Lines/Drains/Airways     Peripheral Intravenous Line                 Peripheral IV - Single Lumen 12/12/20 1514 18 G Left Hand less  than 1 day                Physical Exam   Constitutional: He is oriented to person, place, and time. He appears well-developed and well-nourished.   HENT:   Head: Normocephalic.   Eyes: Pupils are equal, round, and reactive to light.   Neck: Normal range of motion. No JVD present.   Cardiovascular: Normal rate and regular rhythm.   No murmur heard.  Pulses:       Radial pulses are 2+ on the right side and 2+ on the left side.        Dorsalis pedis pulses are 2+ on the right side and 2+ on the left side.        Posterior tibial pulses are 2+ on the right side and 2+ on the left side.   Pulmonary/Chest: Effort normal and breath sounds normal. No respiratory distress. He has no wheezes. He has no rales.   Abdominal: Soft. Bowel sounds are normal. He exhibits no distension. There is no abdominal tenderness.   Musculoskeletal: Normal range of motion.         General: No edema.   Neurological: He is alert and oriented to person, place, and time.   Skin: Skin is warm and dry.   Nursing note and vitals reviewed.      Significant Labs: All pertinent lab results from the last 24 hours have been reviewed.      Assessment and Plan:     * NSTEMI (non-ST elevated myocardial infarction)   Mr. Daniel who is a 65-year-old gentleman with a PMHx of CAD s/p 1V CABG (LIMA-LAD) s/p PCI to ostial LM and LCx/OM1, PAD s/p R EIA stent, HTN, HLD, smoking, alcohol abuse, CKD.  He is now admitted with an NSTEMI.  His initial ECG was concerning with ST elevations in his anterior leads that did not meet STEMI criteria as well as inferolateral ST depressions.  These have slightly improved since initiation of an IV nitroglycerin infusion.  His chest pain is also nearly resolved after initiating IV nitroglycerin.  He has baseline chronic kidney disease and has a creatinine of 3.6 on admission as well as a contrast dye allergy.  Therefore recommend the following:    Plan:  - Continue IV nitroglycerin until chest pain-free, plan to call Interventional  Cardiology immediately if patient has chest pain refractory to nitroglycerin  - Start ACS protocol, continue IV heparin and initiate dual antiplatelet therapy  - Admit for ACS management and need for IV nitroglycerin  - Continue to trend troponin to peak, repeat ECG if patient has recurrence of chest pain  - Obtain TTE  - I loaded the patient with IV Solu-Medrol in case patient decompensates overnight and needs emergent coronary angiogram in order to prep for his dye allergy  - Patient will be at high risk for renal failure given his CKD if the if coronary angiogram will be necessary and this was discussed with the patient  - NPO  - Interventional cardiology will continue to monitor    HTN (hypertension)  Continue home meds: Coreg 25 bid, Amlodipine 10    Alcohol abuse  CIWA protocol  Started on Folic acid and thiamine    Smoker  Started on Nicotine patches/ counseling        VTE Risk Mitigation (From admission, onward)         Ordered     heparin 25,000 units in dextrose 5% (100 units/ml) IV bolus from bag - ADDITIONAL PRN BOLUS - 60 units/kg (max bolus 4000 units)  As needed (PRN)     Question:  Heparin Infusion Adjustment (DO NOT MODIFY ANSWER)  Answer:  \\[email or web address]\Images\Pharmacy\HeparinInfusions\heparin LOW INTENSITY nomogram for [facility] [id number]    12/12/20 1558     heparin 25,000 units in dextrose 5% (100 units/ml) IV bolus from bag - ADDITIONAL PRN BOLUS - 30 units/kg (max bolus 4000 units)  As needed (PRN)     Question:  Heparin Infusion Adjustment (DO NOT MODIFY ANSWER)  Answer:  \\[email or web address]\Images\Pharmacy\HeparinInfusions\heparin LOW INTENSITY nomogram for [facility] [id number]    12/12/20 1558     Place sequential compression device  Until discontinued      12/12/20 2001     IP VTE LOW RISK PATIENT  Once      12/12/20 2001     heparin 25,000 units in dextrose 5% 250 mL (100 units/mL) infusion LOW INTENSITY nomogram - [facility]  Continuous     Question Answer Comment   Heparin Infusion Adjustment (DO NOT  MODIFY ANSWER) \\[facility]\epic\Images\Pharmacy\HeparinInfusions\heparin LOW INTENSITY nomogram for [facility] [id number].pdf    Begin at (in units/kg/hr) 12 12/12/20 1559                Viviana Leon MD  Cardiology   [facility]

## 2020-12-13 NOTE — PLAN OF CARE
No acute events throughout day, VS and assessment per flow sheet, see below for updates:    Pulmonary: RA    Cardiovascular: SR 60s-80s. SBP 110s-150s. Chest pain rated 2/10 overnight.     Neurological: AAox4, afebrile.     Gastrointestinal: NPO.     Infusions: Nitroglycerin, Heparin    Patient progressing towards goals as tolerated, plan of care communicated and reviewed with Emanuel Daniel and family, all concerns addressed, will continue to monitor.     Yamel Yeager RN

## 2020-12-13 NOTE — HPI
Mr. Daniel is a 64 y/o gentleman with a PMHx of CAD s/p 1V CABG (LIMA-LAD) s/p PCI to ostial LM and LCx/OM1, PAD s/p R EIA stent, HTN, HLD, smoking, alcohol abuse, CKD.  He presented to the ED earlier today complaining of chest pain which started earlier this morning >7/10.  Patient states occurred at rest and similar to anginal pain he has felt in the past prior to his PCIs and CABG.  He also has associated shortness of breath.  He denies any syncope, orthopnea, PND, or LE edema.  Patient was still having chest discomfort at the time of my evaluation; however significantly improved to 2/10 on nitro gtt @15.    He does smoke 1.5 packs per day. He missed 2 days worth of plavix because he ran out of medication at home.    Vitals show blood pressure is slightly elevated to the 150s mmHg systolic.  Initial ECG showed ST elevations not meeting STEMI criteria in leads V2 and V3 with inferolateral ST depressions.  Troponin elevated to 2.5.  Cr 3.6, HGB 12.0.

## 2020-12-13 NOTE — NURSING
Troponin resulted at 15.745, patient denies any increase of chest pain. Notified Dr. Leon, who ordered a serial troponin draw at 0500. TM.

## 2020-12-13 NOTE — NURSING
Notified Cards pt having more frequent runs of PVCs and bradycardia intermittently (HR 40-50s,, non-sustaining), pt also symptomatically SOB during episodes. SpO2 99% Lyte panel sent. No new complications to L Fem site, pulses dopplerable, neuro intact, equal strength all extremities. WCM.

## 2020-12-13 NOTE — SUBJECTIVE & OBJECTIVE
Past Medical History:   Diagnosis Date    Alcohol abuse     Angina of effort     Arthritis     CAD (coronary artery disease)     Carotid artery stenosis, symptomatic 12/30/2014    Coronary artery disease     Gout     Hernia     HTN (hypertension)     Hyperlipidemia     Myocardial infarct, old     Smoker active     Past Surgical History:   Procedure Laterality Date    ANGIOPLASTY      CARDIAC CATHETERIZATION      CARDIAC CATHETERIZATION      CORONARY ANGIOPLASTY      CORONARY ARTERY BYPASS GRAFT      FLUOROSCOPIC ANGIOGRAPHY OF LOWER EXTREMITY WITH TOPICAL ULTRASOUND Right 1/2/2019    Procedure: ARTERIOGRAM-LEG AND ULTRASOUND;  Surgeon: GINA Vuong III, MD;  Location: [facility] OR [address] [place] [address];  Service: Peripheral Vascular;  Laterality: Right;  5.1 min  753.92 mGy  8ml Dye  330ml CO2  6ml Local    HEMORRHOID SURGERY      PERCUTANEOUS TRANSLUMINAL ANGIOPLASTY N/A 1/2/2019    Procedure: PTA (ANGIOPLASTY, PERCUTANEOUS, TRANSLUMINAL);  Surgeon: GINA Vuong III, MD;  Location: [facility] OR [address] [place] [address];  Service: Peripheral Vascular;  Laterality: N/A;    TONSILLECTOMY       Review of patient's allergies indicates:   Allergen Reactions    Iodine and iodide containing products      Anaphylactic rxn      No current facility-administered medications on file prior to encounter.      Current Outpatient Medications on File Prior to Encounter   Medication Sig    allopurinol (ZYLOPRIM) 100 MG tablet Take 100 mg by mouth once daily.     amLODIPine (NORVASC) 10 MG tablet amlodipine 10 mg tablet q HS    aspirin (ECOTRIN) 81 MG EC tablet Take 1 tablet (81 mg total) by mouth once daily.    carvediloL (COREG) 25 MG tablet carvedilol 25 mg tablet TWO TIMES A DAY    clopidogreL (PLAVIX) 75 mg tablet clopidogrel 75 mg tablet daily    colchicine (COLCRYS) 0.6 mg tablet     losartan (COZAAR) 25 MG tablet Take 25 mg by mouth once daily.    nitroGLYCERIN (NITROSTAT) 0.4 MG SL tablet nitroglycerin 0.4 mg sublingual tablet     rosuvastatin (CRESTOR) 40 MG Tab Take 1 tablet (40 mg total) by mouth every evening.    sodium zirconium cyclosilicate (LOKELMA) 5 gram packet Lokelma 5 gram oral powder packet     Family History     Problem Relation (Age of Onset)    Clotting disorder Sister    Heart attack Father    Heart disease Father    Heart failure Father    Hypertension Father, Sister, Paternal Grandfather    No Known Problems Mother, Brother, Maternal Grandmother, Maternal Grandfather, Paternal Grandmother, Maternal Aunt, Maternal Uncle, Paternal Aunt, Paternal Uncle    Stroke Sister        Tobacco Use    Smoking status: Current Every Day Smoker     Packs/day: 1.00    Smokeless tobacco: Former User   Substance and Sexual Activity    Alcohol use: Not Currently     Alcohol/week: 0.0 standard drinks     Comment: 3-4 cans of beer a day    Drug use: No    Sexual activity: Not on file     Review of Systems   Constitution: Negative for chills, fever and weight loss.   HENT: Negative for sore throat and stridor.    Eyes: Negative for blurred vision, double vision and pain.   Cardiovascular: Positive for chest pain. Negative for claudication, dyspnea on exertion, near-syncope, orthopnea, palpitations, paroxysmal nocturnal dyspnea and syncope.   Respiratory: Positive for shortness of breath. Negative for cough, sputum production and wheezing.    Endocrine: Negative for polydipsia, polyphagia and polyuria.   Skin: Negative for rash.   Musculoskeletal: Negative for joint pain, joint swelling and myalgias.   Gastrointestinal: Negative for abdominal pain, constipation, diarrhea, heartburn, melena, nausea and vomiting.   Genitourinary: Negative for dysuria and hematuria.   Neurological: Negative for focal weakness and loss of balance.   Psychiatric/Behavioral: Negative for depression and memory loss.     Objective:     Vital Signs (Most Recent):  Temp: 98.3 °F (36.8 °C) (12/12/20 1451)  Pulse: 64 (12/12/20 1901)  Resp: 16 (12/12/20 1900)  BP: (!)  149/70 (12/12/20 1901)  SpO2: 96 % (12/12/20 1901) Vital Signs (24h Range):  Temp:  [98.3 °F (36.8 °C)] 98.3 °F (36.8 °C)  Pulse:  [62-66] 64  Resp:  [16-18] 16  SpO2:  [96 %-100 %] 96 %  BP: (149-155)/() 149/70     Weight: 86.2 kg (190 lb)  Body mass index is 25.77 kg/m².  SpO2: 96 %     No intake or output data in the 24 hours ending 12/12/20 1932    Lines/Drains/Airways     Peripheral Intravenous Line                 Peripheral IV - Single Lumen 12/12/20 1514 18 G Left Hand less than 1 day              Physical Exam   Constitutional: He is oriented to person, place, and time. He appears well-developed and well-nourished.   HENT:   Head: Normocephalic.   Eyes: Pupils are equal, round, and reactive to light.   Neck: Normal range of motion. No JVD present.   Cardiovascular: Normal rate and regular rhythm.   No murmur heard.  Pulses:       Radial pulses are 2+ on the right side and 2+ on the left side.        Dorsalis pedis pulses are 2+ on the right side and 2+ on the left side.        Posterior tibial pulses are 2+ on the right side and 2+ on the left side.   Pulmonary/Chest: Effort normal and breath sounds normal. No respiratory distress. He has no wheezes. He has no rales.   Abdominal: Soft. Bowel sounds are normal. He exhibits no distension. There is no abdominal tenderness.   Musculoskeletal: Normal range of motion.         General: No edema.   Neurological: He is alert and oriented to person, place, and time.   Skin: Skin is warm and dry.     Significant Labs:   CMP   Recent Labs   Lab 12/12/20  1516      K 4.6      CO2 23   *   BUN 29*   CREATININE 3.6*   CALCIUM 9.5   PROT 7.1   ALBUMIN 3.5   BILITOT 0.4   ALKPHOS 149*   AST 12   ALT 8*   ANIONGAP 9   ESTGFRAFRICA 19.5*   EGFRNONAA 16.8*   , CBC   Recent Labs   Lab 12/12/20  1516   WBC 6.45   HGB 12.0*   HCT 37.9*   *    and Troponin   Recent Labs   Lab 12/12/20  1516 12/12/20  1753   TROPONINI 0.594* 2.545*

## 2020-12-13 NOTE — NURSING
Pt departed for cath lab - Heparin stopped, IV benadryl, solumedrol, and pepcid administered pre-procedure.

## 2020-12-14 LAB
ALBUMIN SERPL BCP-MCNC: 3.4 G/DL (ref 3.5–5.2)
ALP SERPL-CCNC: 134 U/L (ref 55–135)
ALT SERPL W/O P-5'-P-CCNC: 15 U/L (ref 10–44)
ANION GAP SERPL CALC-SCNC: 12 MMOL/L (ref 8–16)
AST SERPL-CCNC: 103 U/L (ref 10–40)
BASOPHILS # BLD AUTO: 0.01 K/UL (ref 0–0.2)
BASOPHILS NFR BLD: 0.1 % (ref 0–1.9)
BILIRUB SERPL-MCNC: 0.2 MG/DL (ref 0.1–1)
BUN SERPL-MCNC: 43 MG/DL (ref 8–23)
CALCIUM SERPL-MCNC: 8.5 MG/DL (ref 8.7–10.5)
CHLORIDE SERPL-SCNC: 106 MMOL/L (ref 95–110)
CO2 SERPL-SCNC: 19 MMOL/L (ref 23–29)
CREAT SERPL-MCNC: 3.5 MG/DL (ref 0.5–1.4)
DIFFERENTIAL METHOD: ABNORMAL
EOSINOPHIL # BLD AUTO: 0 K/UL (ref 0–0.5)
EOSINOPHIL NFR BLD: 0 % (ref 0–8)
ERYTHROCYTE [DISTWIDTH] IN BLOOD BY AUTOMATED COUNT: 14.2 % (ref 11.5–14.5)
EST. GFR  (AFRICAN AMERICAN): 20.1 ML/MIN/1.73 M^2
EST. GFR  (NON AFRICAN AMERICAN): 17.4 ML/MIN/1.73 M^2
GLUCOSE SERPL-MCNC: 121 MG/DL (ref 70–110)
HCT VFR BLD AUTO: 36.7 % (ref 40–54)
HGB BLD-MCNC: 11.7 G/DL (ref 14–18)
IMM GRANULOCYTES # BLD AUTO: 0.1 K/UL (ref 0–0.04)
IMM GRANULOCYTES NFR BLD AUTO: 0.9 % (ref 0–0.5)
LYMPHOCYTES # BLD AUTO: 1 K/UL (ref 1–4.8)
LYMPHOCYTES NFR BLD: 8.4 % (ref 18–48)
MAGNESIUM SERPL-MCNC: 2 MG/DL (ref 1.6–2.6)
MCH RBC QN AUTO: 27.7 PG (ref 27–31)
MCHC RBC AUTO-ENTMCNC: 31.9 G/DL (ref 32–36)
MCV RBC AUTO: 87 FL (ref 82–98)
MONOCYTES # BLD AUTO: 0.4 K/UL (ref 0.3–1)
MONOCYTES NFR BLD: 3.3 % (ref 4–15)
NEUTROPHILS # BLD AUTO: 10.2 K/UL (ref 1.8–7.7)
NEUTROPHILS NFR BLD: 87.3 % (ref 38–73)
NRBC BLD-RTO: 0 /100 WBC
PHOSPHATE SERPL-MCNC: 4.2 MG/DL (ref 2.7–4.5)
PLATELET # BLD AUTO: 118 K/UL (ref 150–350)
PMV BLD AUTO: 11.8 FL (ref 9.2–12.9)
POC ACTIVATED CLOTTING TIME K: 208 SEC (ref 74–137)
POC ACTIVATED CLOTTING TIME K: 224 SEC (ref 74–137)
POC ACTIVATED CLOTTING TIME K: 384 SEC (ref 74–137)
POCT GLUCOSE: 124 MG/DL (ref 70–110)
POTASSIUM SERPL-SCNC: 4.7 MMOL/L (ref 3.5–5.1)
PROT SERPL-MCNC: 6.8 G/DL (ref 6–8.4)
RBC # BLD AUTO: 4.22 M/UL (ref 4.6–6.2)
SAMPLE: ABNORMAL
SODIUM SERPL-SCNC: 137 MMOL/L (ref 136–145)
WBC # BLD AUTO: 11.72 K/UL (ref 3.9–12.7)

## 2020-12-14 PROCEDURE — 83735 ASSAY OF MAGNESIUM: CPT

## 2020-12-14 PROCEDURE — 84100 ASSAY OF PHOSPHORUS: CPT

## 2020-12-14 PROCEDURE — 25000242 PHARM REV CODE 250 ALT 637 W/ HCPCS: Performed by: STUDENT IN AN ORGANIZED HEALTH CARE EDUCATION/TRAINING PROGRAM

## 2020-12-14 PROCEDURE — 25000003 PHARM REV CODE 250: Performed by: STUDENT IN AN ORGANIZED HEALTH CARE EDUCATION/TRAINING PROGRAM

## 2020-12-14 PROCEDURE — 80053 COMPREHEN METABOLIC PANEL: CPT

## 2020-12-14 PROCEDURE — 94761 N-INVAS EAR/PLS OXIMETRY MLT: CPT

## 2020-12-14 PROCEDURE — 25000003 PHARM REV CODE 250: Performed by: INTERNAL MEDICINE

## 2020-12-14 PROCEDURE — 85025 COMPLETE CBC W/AUTO DIFF WBC: CPT

## 2020-12-14 PROCEDURE — 63600175 PHARM REV CODE 636 W HCPCS: Performed by: STUDENT IN AN ORGANIZED HEALTH CARE EDUCATION/TRAINING PROGRAM

## 2020-12-14 PROCEDURE — 99233 PR SUBSEQUENT HOSPITAL CARE,LEVL III: ICD-10-PCS | Mod: GC,,, | Performed by: INTERNAL MEDICINE

## 2020-12-14 PROCEDURE — 99233 SBSQ HOSP IP/OBS HIGH 50: CPT | Mod: GC,,, | Performed by: INTERNAL MEDICINE

## 2020-12-14 PROCEDURE — S4991 NICOTINE PATCH NONLEGEND: HCPCS | Performed by: STUDENT IN AN ORGANIZED HEALTH CARE EDUCATION/TRAINING PROGRAM

## 2020-12-14 PROCEDURE — 63600175 PHARM REV CODE 636 W HCPCS: Performed by: INTERNAL MEDICINE

## 2020-12-14 PROCEDURE — 94640 AIRWAY INHALATION TREATMENT: CPT

## 2020-12-14 PROCEDURE — 20000000 HC ICU ROOM

## 2020-12-14 RX ORDER — IPRATROPIUM BROMIDE AND ALBUTEROL SULFATE 2.5; .5 MG/3ML; MG/3ML
3 SOLUTION RESPIRATORY (INHALATION) EVERY 4 HOURS PRN
Status: DISCONTINUED | OUTPATIENT
Start: 2020-12-14 | End: 2020-12-15 | Stop reason: HOSPADM

## 2020-12-14 RX ORDER — HYDROXYZINE PAMOATE 25 MG/1
25 CAPSULE ORAL EVERY 8 HOURS PRN
Status: DISCONTINUED | OUTPATIENT
Start: 2020-12-14 | End: 2020-12-15 | Stop reason: HOSPADM

## 2020-12-14 RX ORDER — LOSARTAN POTASSIUM 25 MG/1
25 TABLET ORAL DAILY
Qty: 90 TABLET | Refills: 3 | Status: CANCELLED | OUTPATIENT
Start: 2020-12-14

## 2020-12-14 RX ADMIN — TICAGRELOR 90 MG: 90 TABLET ORAL at 09:12

## 2020-12-14 RX ADMIN — FOLIC ACID 1 MG: 1 TABLET ORAL at 09:12

## 2020-12-14 RX ADMIN — CARVEDILOL 25 MG: 25 TABLET, FILM COATED ORAL at 09:12

## 2020-12-14 RX ADMIN — ATORVASTATIN CALCIUM 80 MG: 20 TABLET, FILM COATED ORAL at 08:12

## 2020-12-14 RX ADMIN — HEPARIN SODIUM 5000 UNITS: 5000 INJECTION INTRAVENOUS; SUBCUTANEOUS at 05:12

## 2020-12-14 RX ADMIN — ASPIRIN 81 MG: 81 TABLET, COATED ORAL at 09:12

## 2020-12-14 RX ADMIN — HEPARIN SODIUM 5000 UNITS: 5000 INJECTION INTRAVENOUS; SUBCUTANEOUS at 04:12

## 2020-12-14 RX ADMIN — HEPARIN SODIUM 5000 UNITS: 5000 INJECTION INTRAVENOUS; SUBCUTANEOUS at 09:12

## 2020-12-14 RX ADMIN — Medication 100 MG: at 09:12

## 2020-12-14 RX ADMIN — TICAGRELOR 90 MG: 90 TABLET ORAL at 08:12

## 2020-12-14 RX ADMIN — FAMOTIDINE 20 MG: 10 INJECTION INTRAVENOUS at 09:12

## 2020-12-14 RX ADMIN — CEFAZOLIN 2 G: 1 INJECTION, POWDER, FOR SOLUTION INTRAMUSCULAR; INTRAVENOUS at 05:12

## 2020-12-14 RX ADMIN — IPRATROPIUM BROMIDE AND ALBUTEROL SULFATE 3 ML: .5; 2.5 SOLUTION RESPIRATORY (INHALATION) at 12:12

## 2020-12-14 RX ADMIN — CARVEDILOL 25 MG: 25 TABLET, FILM COATED ORAL at 08:12

## 2020-12-14 RX ADMIN — AMLODIPINE BESYLATE 10 MG: 10 TABLET ORAL at 08:12

## 2020-12-14 RX ADMIN — NICOTINE 1 PATCH: 21 PATCH, EXTENDED RELEASE TRANSDERMAL at 08:12

## 2020-12-14 NOTE — ASSESSMENT & PLAN NOTE
Mr. Daniel who is a 65-year-old gentleman with a PMHx of CAD s/p 1V CABG (LIMA-LAD) s/p PCI to ostial LM and LCx/OM1, PAD s/p R EIA stent, HTN, HLD, smoking, alcohol abuse, CKD.  He is now admitted with an NSTEMI.  His initial ECG was concerning with ST elevations in his anterior leads that did not meet STEMI criteria as well as inferolateral ST depressions.  Symptoms improved initially with nitro drip, however patient began complaining of indigestion/abdominal pain concerning for atypical angina.  He was emergently taken to the cath lab for PCI Silva he had successful placement of drug-eluting stents in the OM 1 superior and inferior branch, mid and distal left circumflex, and left circumflex and OM1 bifurcation.    Echo- 12/13/20  · The left ventricle is normal in size with low normal systolic function. The estimated ejection fraction is 50%.  · There are segmental left ventricular wall motion abnormalities.  · Grade III diastolic dysfunction.  · The estimated PA systolic pressure is 50 mmHg    Plan:  GDMT   -Loaded was at 180 mg of Brilinta, Brilinta 90 mg b.i.d. for the next year   -aspirin 81 mg   -Lipitor 80mg   -on losartan at home however due to contrast will hold for now and continue to evaluate kidney function   -Carvedilol 25mg BID

## 2020-12-14 NOTE — PROGRESS NOTES
[facility]  Cardiology  Progress Note    Patient Name: Emanuel Daniel  MRN: [id number]  Admission Date: 12/12/2020  Hospital Length of Stay: 2 days  Code Status: Full Code   Attending Physician: James Levy MD   Primary Care Physician: Tom Diamond MD  Expected Discharge Date: 12/15/2020  Principal Problem:NSTEMI (non-ST elevated myocardial infarction)    Subjective:     Hospital Course:   12/13/20 troponins continue to up trend and despite nitro drip patient had intractable anginal symptoms.  He was taken to the cath lab where he had PCI of OM1 superior and inferior branches with drug-eluting stent and successful PCI of mid and distal left circumflex with drug-eluting stent, and successful PCI the left circumflex OM1 bifurcation with drug-eluting stent. On 12/14/20 patient was stepped down from SICU to cardiac step-down unit.        Interval History:  Patient had 3 vessel PCI performed yesterday.  He is no longer having chest pain, but does complain of some shortness of breath.  Long history of smoking, never been diagnosed with COPD, yet highly likely.  Transferred from SICU to cardiac step-down unit.    History of CKD stage 4 with known contrast allergy.  He was loaded with Solu-Medrol prior to PCI yesterday however he has worsening creatinine.  This was a known complication of the procedure and patient understood it.  Will continue to monitor renal function.    Review of Systems   Constitution: Negative for chills, fever and weight loss.   HENT: Negative for sore throat and stridor.    Eyes: Negative for blurred vision, double vision and pain.   Cardiovascular: Negative for chest pain, claudication, dyspnea on exertion, near-syncope, orthopnea, palpitations, paroxysmal nocturnal dyspnea and syncope.   Respiratory: Positive for shortness of breath. Negative for cough, sputum production and wheezing.    Endocrine: Negative for polydipsia, polyphagia and polyuria.   Skin:  Negative for rash.   Musculoskeletal: Negative for joint pain, joint swelling and myalgias.   Gastrointestinal: Negative for abdominal pain, constipation, diarrhea, heartburn, melena, nausea and vomiting.   Genitourinary: Negative for dysuria and hematuria.   Neurological: Negative for focal weakness and loss of balance.   Psychiatric/Behavioral: The patient is nervous/anxious.      Objective:     Vital Signs (Most Recent):  Temp: 97.9 °F (36.6 °C) (12/14/20 0701)  Pulse: 68 (12/14/20 1000)  Resp: 20 (12/14/20 1000)  BP: 135/68 (12/14/20 1000)  SpO2: (!) 94 % (12/14/20 1000) Vital Signs (24h Range):  Temp:  [97.1 °F (36.2 °C)-98.1 °F (36.7 °C)] 97.9 °F (36.6 °C)  Pulse:  [63-97] 68  Resp:  [11-39] 20  SpO2:  [87 %-100 %] 94 %  BP: (111-171)/() 135/68     Weight: 89 kg (196 lb 3.2 oz)  Body mass index is 28.15 kg/m².     SpO2: (!) 94 %  O2 Device (Oxygen Therapy): room air      Intake/Output Summary (Last 24 hours) at 12/14/2020 1115  Last data filed at 12/14/2020 0800  Gross per 24 hour   Intake 651.6 ml   Output 1725 ml   Net -1073.4 ml       Lines/Drains/Airways     Peripheral Intravenous Line                 Peripheral IV - Single Lumen 12/12/20 1514 18 G Left Hand 1 day         Peripheral IV - Single Lumen 12/13/20 1436 20 G Anterior;Distal;Left Antecubital less than 1 day                Physical Exam   Constitutional: He is oriented to person, place, and time. He appears well-developed and well-nourished.   HENT:   Head: Normocephalic.   Neck: Normal range of motion. No JVD present.   Cardiovascular: Normal rate, regular rhythm and intact distal pulses.   No murmur heard.  Pulmonary/Chest: Effort normal and breath sounds normal. No respiratory distress. He has no wheezes. He has no rales.   Abdominal: Soft. Bowel sounds are normal. He exhibits no distension. There is no abdominal tenderness.   Musculoskeletal: Normal range of motion.         General: No edema.   Neurological: He is alert and oriented to  person, place, and time.   Skin: Skin is warm and dry.   Psychiatric: His mood appears anxious.   Nursing note and vitals reviewed.      Significant Labs: All pertinent lab results from the last 24 hours have been reviewed.    Significant Imaging: Echocardiogram:   Transthoracic echo (TTE) complete (Cupid Only):   Results for orders placed or performed during the hospital encounter of 12/12/20   Echo Color Flow Doppler? Yes   Result Value Ref Range    Ascending aorta 3.67 cm    STJ 3.51 cm    AV mean gradient 3 mmHg    Ao peak clay 1.07 m/s    Ao VTI 22.93 cm    IVRT 91.34 msec    IVS 0.98 0.6 - 1.1 cm    LA size 4.63 cm    Left Atrium Major Axis 5.70 cm    Left Atrium Minor Axis 5.70 cm    LVIDd 5.67 3.5 - 6.0 cm    LVIDs 4.85 (A) 2.1 - 4.0 cm    LVOT diameter 2.14 cm    LVOT peak VTI 15.25 cm    Posterior Wall 1.07 0.6 - 1.1 cm    MV Peak A Clay 0.51 m/s    E wave decelartion time 136.98 msec    MV Peak E Clay 1.14 m/s    RA Major Axis 4.97 cm    RA Width 3.48 cm    RVDD 2.76 cm    Sinus 4.10 cm    TAPSE 2.54 cm    TR Max Clay 3.42 m/s    TDI LATERAL 0.06 m/s    TDI SEPTAL 0.06 m/s    LA WIDTH 3.86 cm    LV Diastolic Volume 157.97 mL    LV Systolic Volume 110.25 mL    RV S' 11.01 cm/s    LVOT peak clay 0.84 m/s    LA volume (mod) 45.62 cm3    LV LATERAL E/E' RATIO 19.00 m/s    LV SEPTAL E/E' RATIO 19.00 m/s    FS 14 %    LA volume 86.59 cm3    LV mass 231.74 g    Left Ventricle Relative Wall Thickness 0.38 cm    AV valve area 2.39 cm2    AV Velocity Ratio 0.79     AV index (prosthetic) 0.67     E/A ratio 2.24     Mean e' 0.06 m/s    LVOT area 3.6 cm2    LVOT stroke volume 54.82 cm3    AV peak gradient 5 mmHg    E/E' ratio 19.00 m/s    LV Systolic Volume Index 52.3 mL/m2    LV Diastolic Volume Index 74.95 mL/m2    LA Volume Index 41.1 mL/m2    LV Mass Index 110 g/m2    Triscuspid Valve Regurgitation Peak Gradient 47 mmHg    LA Volume Index (Mod) 21.6 mL/m2    BSA 2.12 m2    Right Atrial Pressure (from IVC) 3 mmHg    TV  rest pulmonary artery pressure 50 mmHg    Narrative    · The left ventricle is normal in size with low normal systolic function.   The estimated ejection fraction is 50%.  · There are segmental left ventricular wall motion abnormalities.  · Grade III diastolic dysfunction.  · Normal right ventricular size with normal right ventricular systolic   function.  · Mild left atrial enlargement.  · Mild aortic regurgitation.  · Moderate mitral regurgitation.  · Mild tricuspid regurgitation.  · The estimated PA systolic pressure is 50 mmHg.  · There is pulmonary hypertension.  · Normal central venous pressure (3 mmHg).        Assessment and Plan:     * NSTEMI (non-ST elevated myocardial infarction)   Mr. Daniel who is a 65-year-old gentleman with a PMHx of CAD s/p 1V CABG (LIMA-LAD) s/p PCI to ostial LM and LCx/OM1, PAD s/p R EIA stent, HTN, HLD, smoking, alcohol abuse, CKD.  He is now admitted with an NSTEMI.  His initial ECG was concerning with ST elevations in his anterior leads that did not meet STEMI criteria as well as inferolateral ST depressions.  Symptoms improved initially with nitro drip, however patient began complaining of indigestion/abdominal pain concerning for atypical angina.  He was emergently taken to the cath lab for PCI [place] he had successful placement of drug-eluting stents in the OM 1 superior and inferior branch, mid and distal left circumflex, and left circumflex and OM1 bifurcation.    Echo- 12/13/20  · The left ventricle is normal in size with low normal systolic function. The estimated ejection fraction is 50%.  · There are segmental left ventricular wall motion abnormalities.  · Grade III diastolic dysfunction.  · The estimated PA systolic pressure is 50 mmHg    Plan:  GDMT   -Loaded was at 180 mg of Brilinta, Brilinta 90 mg b.i.d. for the next year   -aspirin 81 mg   -Lipitor 80mg   -on losartan at home however due to contrast will hold for now and continue to evaluate kidney  function   -Carvedilol 25mg BID        Stage 4 chronic kidney disease  Baseline approximately 3, although unable to be certain as to worsening baseline over the last year.  Creatinine on admission 3.6.  Patient has 1 prior to PCI that with history of CKD he could have worsening renal function with contrast.    Plan  -Continue to monitor creatinine  -Avoid nephrotoxic agents  -Strict I's and O's  -patient will need to follow-up with nephrology upon discharge.     HTN (hypertension)  Continue home meds: Coreg 25 bid, Amlodipine 10    Alcohol abuse  Previous 12 beer per day drinker.  Patient reports not drinking and more than 3 years.  Plan  -discontinue CIWA protocol  -Discontinue thiamine and folic acid    Smoker  1.5ppd  Nicotine patch  No formal diagnosis of COPD however patient continues to have recurrence episodes of dyspnea.  Plan  -DuoNebs Q 4 p.r.n. for shortness of breath.  -counseled on importance of smoking cessation        VTE Risk Mitigation (From admission, onward)         Ordered     heparin (porcine) injection 5,000 Units  Every 8 hours      12/13/20 1428     Place sequential compression device  Until discontinued      12/12/20 2001     IP VTE LOW RISK PATIENT  Once      12/12/20 2001                Jung Padilla DO  Cardiology  [facility]

## 2020-12-14 NOTE — ASSESSMENT & PLAN NOTE
Previous 12 beer per day drinker.  Patient reports not drinking and more than 3 years.  Plan  -discontinue CIWA protocol  -Discontinue thiamine and folic acid

## 2020-12-14 NOTE — PLAN OF CARE
CM met with patient at the bedside to discuss D/C POC needs. Patient AAO x's 3 and able to verify demographics in the chart are correct. CM name and contact number listed on the patient's white board.  CM provided explanation of discharge plan process. CM left dc planning booklet at the bedside with explanation of qualification for placement and facility resources. Patient/family expressed understanding. CM remains available for any further patient needs or concerns.   Lives home alone, but has a friend (Leodan Garcia) that he uses for support.  Patient also has a sister (Krys) who lives in [place].  Has a PCP (Dr Astorga) from [facility], but unable to identify fully.      [id number] - [place], [place] - [address]  [address]  [place] [address]  Phone: [phone] Fax: [phone]    Extended Emergency Contact Information  Primary Emergency Contact: Bill Garcia  Mobile Phone: [phone]  Relation: Friend  Preferred language: English   needed? No  Secondary Emergency Contact: Krys Carbone  Mobile Phone: [phone]  Relation: Sister   needed? No    Payor: [employer] / Plan: [employer] 65 / Product Type: Medicare Advantage /     NSTEMI (non-ST elevated myocardial infarction) [I21.4]  Chest pain [R07.9]  Chest pain, unspecified type [R07.9]  NSTEMI (non-ST elevated myocardial infarction) [I21.4]  NSTEMI (non-ST elevated myocardial infarction) [I21.4]         12/14/20 1126   Discharge Assessment   Assessment Type Discharge Planning Assessment   Confirmed/corrected address and phone number on facesheet? Yes   Assessment information obtained from? Patient   Prior to hospitilization cognitive status: Alert/Oriented   Prior to hospitalization functional status: Independent   Current cognitive status: Alert/Oriented   Current Functional Status: Independent   Facility Arrived From: ED   Lives With alone   Able to Return to Prior Arrangements other  (see comments)  (TBD)   Is patient able to care for self after discharge? Unable to determine at this time (comments)   Who are your caregiver(s) and their phone number(s)? Krys (mickey) [phone]   Readmission Within the Last 30 Days no previous admission in last 30 days   Patient currently being followed by outpatient case management? No   Patient currently receives any other outside agency services? No   Equipment Currently Used at Home walker, rolling;wheelchair;cane, straight;crutches  (bp cuff, per admission history)   Do you have any problems affording any of your prescribed medications? No   Is the patient taking medications as prescribed? yes   Does the patient have transportation home? Yes   Transportation Anticipated family or friend will provide   Does the patient receive services at the [facility]? No   Discharge Plan A Home   Discharge Plan B Home;Home Health   DME Needed Upon Discharge  other (see comments)  (TBD)       Ric Guajardo MSN, RN-BC  Critical Care-   Ext. [phone]

## 2020-12-14 NOTE — PLAN OF CARE
CMICU DAILY GOALS       A: Awake    RASS: Goal - RASS Goal: 0-->alert and calm  Actual - RASS (March Agitation-Sedation Scale): 0-->alert and calm   Restraint necessity:    B: Breath   SBT: Not intubated   C: Coordinate A & B, analgesics/sedatives   Pain: managed    SAT: Not intubated  D: Delirium   CAM-ICU: Overall CAM-ICU: Negative  E: Early(intubated/ Progressive (non-intubated) Mobility   MOVE Screen: Pass   Activity: Activity Management: ankle pumps - L1, arm raise - L1  FAS: Feeding/Nutrition   Diet order: Diet/Nutrition Received: low saturated fat/low cholesterol, 2 gram sodium,   Fluid restriction:    T: Thrombus   DVT prophylaxis: VTE Required Core Measure: Pharmacological prophylaxis initiated/maintained  H: HOB Elevation   Head of Bed (HOB): HOB at 30-45 degrees  U: Ulcer Prophylaxis   GI: no  G: Glucose control   managed    S: Skin   Bundle compliance: yes     Date: 12/13/20  AM shift  B: Bowel Function   no issues   I: Indwelling Catheters   [place] necessity:     CVC necessity: No   IPAD offered: No  D: De-escalation Antibx   No  Plan for the day   Monitor femoral site post-cath. Monitor UOP, chest pain, and SOB.   Family/Goals of care/Code Status   Code Status: Full Code     No acute events throughout day, VS and assessment per flow sheet, patient progressing towards goals as tolerated, plan of care reviewed with Emanuel Daniel and family, all concerns addressed, will continue to monitor.

## 2020-12-14 NOTE — ASSESSMENT & PLAN NOTE
1.5ppd  Nicotine patch  No formal diagnosis of COPD however patient continues to have recurrence episodes of dyspnea.  Plan  -DuoNebs Q 4 p.r.n. for shortness of breath.  -counseled on importance of smoking cessation

## 2020-12-14 NOTE — SUBJECTIVE & OBJECTIVE
Interval History:  Patient had 3 vessel PCI performed yesterday.  He is no longer having chest pain, but does complain of some shortness of breath.  Long history of smoking, never been diagnosed with COPD, yet highly likely.  Transferred from SICU to cardiac step-down unit.    History of CKD stage 4 with known contrast allergy.  He was loaded with Solu-Medrol prior to PCI yesterday however he has worsening creatinine.  This was a known complication of the procedure and patient understood it.  Will continue to monitor renal function.    Review of Systems   Constitution: Negative for chills, fever and weight loss.   HENT: Negative for sore throat and stridor.    Eyes: Negative for blurred vision, double vision and pain.   Cardiovascular: Negative for chest pain, claudication, dyspnea on exertion, near-syncope, orthopnea, palpitations, paroxysmal nocturnal dyspnea and syncope.   Respiratory: Positive for shortness of breath. Negative for cough, sputum production and wheezing.    Endocrine: Negative for polydipsia, polyphagia and polyuria.   Skin: Negative for rash.   Musculoskeletal: Negative for joint pain, joint swelling and myalgias.   Gastrointestinal: Negative for abdominal pain, constipation, diarrhea, heartburn, melena, nausea and vomiting.   Genitourinary: Negative for dysuria and hematuria.   Neurological: Negative for focal weakness and loss of balance.   Psychiatric/Behavioral: The patient is nervous/anxious.      Objective:     Vital Signs (Most Recent):  Temp: 97.9 °F (36.6 °C) (12/14/20 0701)  Pulse: 68 (12/14/20 1000)  Resp: 20 (12/14/20 1000)  BP: 135/68 (12/14/20 1000)  SpO2: (!) 94 % (12/14/20 1000) Vital Signs (24h Range):  Temp:  [97.1 °F (36.2 °C)-98.1 °F (36.7 °C)] 97.9 °F (36.6 °C)  Pulse:  [63-97] 68  Resp:  [11-39] 20  SpO2:  [87 %-100 %] 94 %  BP: (111-171)/() 135/68     Weight: 89 kg (196 lb 3.2 oz)  Body mass index is 28.15 kg/m².     SpO2: (!) 94 %  O2 Device (Oxygen Therapy): room  air      Intake/Output Summary (Last 24 hours) at 12/14/2020 1115  Last data filed at 12/14/2020 0800  Gross per 24 hour   Intake 651.6 ml   Output 1725 ml   Net -1073.4 ml       Lines/Drains/Airways     Peripheral Intravenous Line                 Peripheral IV - Single Lumen 12/12/20 1514 18 G Left Hand 1 day         Peripheral IV - Single Lumen 12/13/20 1436 20 G Anterior;Distal;Left Antecubital less than 1 day                Physical Exam   Constitutional: He is oriented to person, place, and time. He appears well-developed and well-nourished.   HENT:   Head: Normocephalic.   Neck: Normal range of motion. No JVD present.   Cardiovascular: Normal rate, regular rhythm and intact distal pulses.   No murmur heard.  Pulmonary/Chest: Effort normal and breath sounds normal. No respiratory distress. He has no wheezes. He has no rales.   Abdominal: Soft. Bowel sounds are normal. He exhibits no distension. There is no abdominal tenderness.   Musculoskeletal: Normal range of motion.         General: No edema.   Neurological: He is alert and oriented to person, place, and time.   Skin: Skin is warm and dry.   Psychiatric: His mood appears anxious.   Nursing note and vitals reviewed.      Significant Labs: All pertinent lab results from the last 24 hours have been reviewed.    Significant Imaging: Echocardiogram:   Transthoracic echo (TTE) complete (Cupid Only):   Results for orders placed or performed during the hospital encounter of 12/12/20   Echo Color Flow Doppler? Yes   Result Value Ref Range    Ascending aorta 3.67 cm    STJ 3.51 cm    AV mean gradient 3 mmHg    Ao peak clay 1.07 m/s    Ao VTI 22.93 cm    IVRT 91.34 msec    IVS 0.98 0.6 - 1.1 cm    LA size 4.63 cm    Left Atrium Major Axis 5.70 cm    Left Atrium Minor Axis 5.70 cm    LVIDd 5.67 3.5 - 6.0 cm    LVIDs 4.85 (A) 2.1 - 4.0 cm    LVOT diameter 2.14 cm    LVOT peak VTI 15.25 cm    Posterior Wall 1.07 0.6 - 1.1 cm    MV Peak A Clay 0.51 m/s    E wave decelartion  time 136.98 msec    MV Peak E Clay 1.14 m/s    RA Major Axis 4.97 cm    RA Width 3.48 cm    RVDD 2.76 cm    Sinus 4.10 cm    TAPSE 2.54 cm    TR Max Clay 3.42 m/s    TDI LATERAL 0.06 m/s    TDI SEPTAL 0.06 m/s    LA WIDTH 3.86 cm    LV Diastolic Volume 157.97 mL    LV Systolic Volume 110.25 mL    RV S' 11.01 cm/s    LVOT peak clay 0.84 m/s    LA volume (mod) 45.62 cm3    LV LATERAL E/E' RATIO 19.00 m/s    LV SEPTAL E/E' RATIO 19.00 m/s    FS 14 %    LA volume 86.59 cm3    LV mass 231.74 g    Left Ventricle Relative Wall Thickness 0.38 cm    AV valve area 2.39 cm2    AV Velocity Ratio 0.79     AV index (prosthetic) 0.67     E/A ratio 2.24     Mean e' 0.06 m/s    LVOT area 3.6 cm2    LVOT stroke volume 54.82 cm3    AV peak gradient 5 mmHg    E/E' ratio 19.00 m/s    LV Systolic Volume Index 52.3 mL/m2    LV Diastolic Volume Index 74.95 mL/m2    LA Volume Index 41.1 mL/m2    LV Mass Index 110 g/m2    Triscuspid Valve Regurgitation Peak Gradient 47 mmHg    LA Volume Index (Mod) 21.6 mL/m2    BSA 2.12 m2    Right Atrial Pressure (from IVC) 3 mmHg    TV rest pulmonary artery pressure 50 mmHg    Narrative    · The left ventricle is normal in size with low normal systolic function.   The estimated ejection fraction is 50%.  · There are segmental left ventricular wall motion abnormalities.  · Grade III diastolic dysfunction.  · Normal right ventricular size with normal right ventricular systolic   function.  · Mild left atrial enlargement.  · Mild aortic regurgitation.  · Moderate mitral regurgitation.  · Mild tricuspid regurgitation.  · The estimated PA systolic pressure is 50 mmHg.  · There is pulmonary hypertension.  · Normal central venous pressure (3 mmHg).

## 2020-12-15 VITALS
DIASTOLIC BLOOD PRESSURE: 81 MMHG | SYSTOLIC BLOOD PRESSURE: 128 MMHG | RESPIRATION RATE: 14 BRPM | TEMPERATURE: 98 F | WEIGHT: 196.19 LBS | BODY MASS INDEX: 28.09 KG/M2 | HEART RATE: 63 BPM | HEIGHT: 70 IN | OXYGEN SATURATION: 98 %

## 2020-12-15 LAB
ALBUMIN SERPL BCP-MCNC: 3.1 G/DL (ref 3.5–5.2)
ALP SERPL-CCNC: 108 U/L (ref 55–135)
ALT SERPL W/O P-5'-P-CCNC: <5 U/L (ref 10–44)
ANION GAP SERPL CALC-SCNC: 11 MMOL/L (ref 8–16)
AST SERPL-CCNC: 47 U/L (ref 10–40)
BASOPHILS # BLD AUTO: 0 K/UL (ref 0–0.2)
BASOPHILS NFR BLD: 0 % (ref 0–1.9)
BILIRUB SERPL-MCNC: 0.2 MG/DL (ref 0.1–1)
BUN SERPL-MCNC: 62 MG/DL (ref 8–23)
CALCIUM SERPL-MCNC: 8 MG/DL (ref 8.7–10.5)
CHLORIDE SERPL-SCNC: 108 MMOL/L (ref 95–110)
CO2 SERPL-SCNC: 19 MMOL/L (ref 23–29)
CREAT SERPL-MCNC: 3.5 MG/DL (ref 0.5–1.4)
DIFFERENTIAL METHOD: ABNORMAL
EOSINOPHIL # BLD AUTO: 0 K/UL (ref 0–0.5)
EOSINOPHIL NFR BLD: 0 % (ref 0–8)
ERYTHROCYTE [DISTWIDTH] IN BLOOD BY AUTOMATED COUNT: 14.4 % (ref 11.5–14.5)
EST. GFR  (AFRICAN AMERICAN): 20.1 ML/MIN/1.73 M^2
EST. GFR  (NON AFRICAN AMERICAN): 17.4 ML/MIN/1.73 M^2
GLUCOSE SERPL-MCNC: 111 MG/DL (ref 70–110)
HCT VFR BLD AUTO: 32.8 % (ref 40–54)
HGB BLD-MCNC: 10.3 G/DL (ref 14–18)
IMM GRANULOCYTES # BLD AUTO: 0.05 K/UL (ref 0–0.04)
IMM GRANULOCYTES NFR BLD AUTO: 0.6 % (ref 0–0.5)
LYMPHOCYTES # BLD AUTO: 1.5 K/UL (ref 1–4.8)
LYMPHOCYTES NFR BLD: 17.1 % (ref 18–48)
MAGNESIUM SERPL-MCNC: 2.1 MG/DL (ref 1.6–2.6)
MCH RBC QN AUTO: 27.1 PG (ref 27–31)
MCHC RBC AUTO-ENTMCNC: 31.4 G/DL (ref 32–36)
MCV RBC AUTO: 86 FL (ref 82–98)
MONOCYTES # BLD AUTO: 0.5 K/UL (ref 0.3–1)
MONOCYTES NFR BLD: 5.8 % (ref 4–15)
NEUTROPHILS # BLD AUTO: 6.6 K/UL (ref 1.8–7.7)
NEUTROPHILS NFR BLD: 76.5 % (ref 38–73)
NRBC BLD-RTO: 0 /100 WBC
PHOSPHATE SERPL-MCNC: 3.7 MG/DL (ref 2.7–4.5)
PLATELET # BLD AUTO: 127 K/UL (ref 150–350)
PMV BLD AUTO: 12 FL (ref 9.2–12.9)
POTASSIUM SERPL-SCNC: 4.4 MMOL/L (ref 3.5–5.1)
PROT SERPL-MCNC: 6 G/DL (ref 6–8.4)
RBC # BLD AUTO: 3.8 M/UL (ref 4.6–6.2)
SODIUM SERPL-SCNC: 138 MMOL/L (ref 136–145)
WBC # BLD AUTO: 8.6 K/UL (ref 3.9–12.7)

## 2020-12-15 PROCEDURE — 25000003 PHARM REV CODE 250: Performed by: STUDENT IN AN ORGANIZED HEALTH CARE EDUCATION/TRAINING PROGRAM

## 2020-12-15 PROCEDURE — 27000221 HC OXYGEN, UP TO 24 HOURS

## 2020-12-15 PROCEDURE — 85025 COMPLETE CBC W/AUTO DIFF WBC: CPT

## 2020-12-15 PROCEDURE — 99239 PR HOSPITAL DISCHARGE DAY,>30 MIN: ICD-10-PCS | Mod: GC,,, | Performed by: INTERNAL MEDICINE

## 2020-12-15 PROCEDURE — 94761 N-INVAS EAR/PLS OXIMETRY MLT: CPT

## 2020-12-15 PROCEDURE — 63600175 PHARM REV CODE 636 W HCPCS: Performed by: STUDENT IN AN ORGANIZED HEALTH CARE EDUCATION/TRAINING PROGRAM

## 2020-12-15 PROCEDURE — 25000003 PHARM REV CODE 250: Performed by: INTERNAL MEDICINE

## 2020-12-15 PROCEDURE — S4991 NICOTINE PATCH NONLEGEND: HCPCS | Performed by: STUDENT IN AN ORGANIZED HEALTH CARE EDUCATION/TRAINING PROGRAM

## 2020-12-15 PROCEDURE — 80053 COMPREHEN METABOLIC PANEL: CPT

## 2020-12-15 PROCEDURE — 83735 ASSAY OF MAGNESIUM: CPT

## 2020-12-15 PROCEDURE — 99239 HOSP IP/OBS DSCHRG MGMT >30: CPT | Mod: GC,,, | Performed by: INTERNAL MEDICINE

## 2020-12-15 PROCEDURE — 84100 ASSAY OF PHOSPHORUS: CPT

## 2020-12-15 RX ORDER — ASPIRIN 81 MG/1
81 TABLET ORAL DAILY
Qty: 30 TABLET | Refills: 11 | Status: CANCELLED | OUTPATIENT
Start: 2020-12-15

## 2020-12-15 RX ORDER — ATORVASTATIN CALCIUM 80 MG/1
80 TABLET, FILM COATED ORAL NIGHTLY
Qty: 90 TABLET | Refills: 3 | Status: SHIPPED | OUTPATIENT
Start: 2020-12-15 | End: 2021-12-15

## 2020-12-15 RX ORDER — NITROGLYCERIN 0.4 MG/1
0.4 TABLET SUBLINGUAL EVERY 5 MIN PRN
Qty: 25 TABLET | Refills: 2 | Status: SHIPPED | OUTPATIENT
Start: 2020-12-15

## 2020-12-15 RX ORDER — ATORVASTATIN CALCIUM 80 MG/1
80 TABLET, FILM COATED ORAL NIGHTLY
Qty: 30 TABLET | Refills: 11 | Status: CANCELLED | OUTPATIENT
Start: 2020-12-15 | End: 2021-12-15

## 2020-12-15 RX ORDER — FAMOTIDINE 20 MG/1
20 TABLET, FILM COATED ORAL DAILY
Status: CANCELLED | OUTPATIENT
Start: 2020-12-16

## 2020-12-15 RX ADMIN — ASPIRIN 81 MG: 81 TABLET, COATED ORAL at 08:12

## 2020-12-15 RX ADMIN — FAMOTIDINE 20 MG: 10 INJECTION INTRAVENOUS at 08:12

## 2020-12-15 RX ADMIN — NICOTINE 1 PATCH: 21 PATCH, EXTENDED RELEASE TRANSDERMAL at 09:12

## 2020-12-15 RX ADMIN — TICAGRELOR 90 MG: 90 TABLET ORAL at 08:12

## 2020-12-15 RX ADMIN — CARVEDILOL 25 MG: 25 TABLET, FILM COATED ORAL at 08:12

## 2020-12-15 RX ADMIN — HEPARIN SODIUM 5000 UNITS: 5000 INJECTION INTRAVENOUS; SUBCUTANEOUS at 06:12

## 2020-12-15 NOTE — PLAN OF CARE
CMICU DAILY GOALS       A: Awake    RASS: Goal - RASS Goal: 0-->alert and calm  Actual - RASS (March Agitation-Sedation Scale): 0-->alert and calm   Restraint necessity:    B: Breath   SBT: Not intubated   C: Coordinate A & B, analgesics/sedatives   Pain: managed    SAT: Not intubated  D: Delirium   CAM-ICU: Overall CAM-ICU: Negative  E: Early(intubated/ Progressive (non-intubated) Mobility   MOVE Screen: Pass   Activity: Activity Management: sitting at edge of bed - L2  FAS: Feeding/Nutrition   Diet order: Diet/Nutrition Received: low saturated fat/low cholesterol, 2 gram sodium,   Fluid restriction:    T: Thrombus   DVT prophylaxis: VTE Required Core Measure: Pharmacological prophylaxis initiated/maintained  H: HOB Elevation   Head of Bed (HOB): HOB at 30-45 degrees  U: Ulcer Prophylaxis   GI: yes  G: Glucose control   managed    S: Skin   Bundle compliance: yes   Bathing/Skin Care: bath, chlorhexidine, bath, complete, dressed/undressed Date: 12/14  B: Bowel Function   no issues   I: Indwelling Catheters   [place] necessity:     CVC necessity: No   IPAD offered: No  D: De-escalation Antibx   No  Plan for the day   Patient to D/C home today if everything checks out. No events overnight. No complaints at this time. VSS. WCM.   Family/Goals of care/Code Status   Code Status: Full Code     No acute events throughout day, VS and assessment per flow sheet, patient progressing towards goals as tolerated, plan of care reviewed with Emanuel Daniel and family, all concerns addressed, will continue to monitor.

## 2020-12-15 NOTE — PLAN OF CARE
CMICU DAILY GOALS       A: Awake    RASS: Goal - RASS Goal: 0-->alert and calm  Actual - RASS (March Agitation-Sedation Scale): 0-->alert and calm   Restraint necessity:    B: Breath   SBT: Not intubated   C: Coordinate A & B, analgesics/sedatives   Pain: managed    SAT: Not intubated  D: Delirium   CAM-ICU: Overall CAM-ICU: Negative  E: Early(intubated/ Progressive (non-intubated) Mobility   MOVE Screen: Pass   Activity: Activity Management: ambulated in [place]egas - L4  FAS: Feeding/Nutrition   Diet order: Diet/Nutrition Received: 2 gram sodium, low saturated fat/low cholesterol,   Fluid restriction:    T: Thrombus   DVT prophylaxis: VTE Required Core Measure: Pharmacological prophylaxis initiated/maintained  H: HOB Elevation   Head of Bed (HOB): HOB at 45 degrees  U: Ulcer Prophylaxis   GI: yes  G: Glucose control   managed    S: Skin   Bundle compliance: yes   Bathing/Skin Care: bath, chlorhexidine, bath, complete, dressed/undressed Date: 12/14/20 AM Shift  B: Bowel Function   no issues   I: Indwelling Catheters   [place] necessity:     CVC necessity: No   IPAD offered: No  D: De-escalation Antibx   Yes  Plan for the day   Transfer orders placed this morning. No beds available on CSU at this time. Plan to discharge in the morning to home. AOx4. Pt walked twice in hallway this afternoon without significant changes in vitals or complaint of chest pain. POC reviewed with patient. BREANNM.   Family/Goals of care/Code Status   Code Status: Full Code     No acute events throughout day, VS and assessment per flow sheet, patient progressing towards goals as tolerated, plan of care reviewed with Emanuel Daniel and family, all concerns addressed, will continue to monitor.

## 2020-12-15 NOTE — NURSING
Patient discharged to family. AVS reviewed at this time, all discharge medications reviewed with pharmacist at bedside. All questions and concerns addressed with Mr. Daniel.

## 2020-12-16 ENCOUNTER — PATIENT OUTREACH (OUTPATIENT)
Dept: ADMINISTRATIVE | Facility: CLINIC | Age: 65
End: 2020-12-16

## 2020-12-16 DIAGNOSIS — I21.4 NSTEMI (NON-ST ELEVATED MYOCARDIAL INFARCTION): Primary | ICD-10-CM

## 2020-12-16 NOTE — PLAN OF CARE
Patient discharged home with no needs.  Family available for transportation.  Information given to him per medical staff for f/u and symptoms to notify provider.      DEANNE Argueta MD  Call in 1 day  Call to set up an appointment after discharge from the hospital. Your PCP will need to ensure that you have a follow-up with the nephrologist.  [address] [facility]  [address]  [facility] [address]  [phone]            12/16/20 0902   Final Note   Assessment Type Final Discharge Note   Anticipated Discharge Disposition Home   What phone number can be called within the next 1-3 days to see how you are doing after discharge? [phone]   Hospital Follow Up  Appt(s) scheduled? Yes   Discharge plans and expectations educations in teach back method with documentation complete? Yes   Right Care Referral Info   Post Acute Recommendation No Care   Post-Acute Status   Post-Acute Authorization Other   Other Status No Post-Acute Service Needs   Discharge Delays None known at this time     Alexsandra Vaughan, RN, BSN  Case Management  Ext [phone]           (4) rarely moist

## 2020-12-16 NOTE — ASSESSMENT & PLAN NOTE
Plan:  GDMT   -Loaded was at 180 mg of Brilinta, Brilinta 90 mg b.i.d. for the next year   -aspirin 81 mg   -Lipitor 80mg   -Carvedilol 25mg BID

## 2020-12-16 NOTE — PATIENT INSTRUCTIONS
Discharge Instructions for Heart Attack  You have had a heart attack. Also known as acute myocardial infarction, or AMI, a heart attack occurs when a vessel supplying the heart with blood suddenly becomes blocked. Follow these guidelines for home care and lifestyle changes.  Home Care  Take your medications exactly as directed. Dont skip doses.  Remember that recovery after a heart attack takes time. Plan to rest for at least 4-8 weeks while you recover. Then return to normal activity when your doctor says its okay.  Ask your doctor about joining a heart rehabilitation program.  Tell your doctor if you are feeling depressed. Feelings of sadness are common after a heart attack, but it is important that you speak to someone if you are feeling overwhelmed by these feelings.  If you are having chest pain, call 911 for an ambulance. Do NOT drive yourself to the hospital.  Ask your family members to learn CPR.  Learn to take your own blood pressure and pulse. Keep a record of your results. Ask your doctor when you should seek emergency medical attention. He or she will tell you which blood pressure reading is dangerous.  Lifestyle Changes  Maintain a healthy weight. Get help to lose any extra pounds.  Cut back on salt.  Limit canned, dried, packaged, and fast foods.  Dont add salt to your food.  Season foods with herbs instead of salt when you cook.  Break the smoking habit. Enroll in a stop-smoking program to improve your chances of success.  Limit fatty foods.  Check your lipid levels regularly. (Your doctor can show you how to do this.)  Build up your activity according to your doctors recommendation.  Ask your doctor when its okay to resume sexual activity.  Tell your doctor about any erectile dysfunction (ED) medication you are taking. Some ED medications are not safe if you take certain heart medications.  Try to manage stress.  Follow-Up  Make a follow-up appointment as directed by our staff.    When to Seek  Medical Attention  Call 911 right away if you have:  Chest pain that is not relieved by medication.  Shortness of breath.  Otherwise, call your doctor immediately if you have:  Lightheadedness, dizziness, or fainting.  Feeling of irregular heartbeat or fast pulse.   © 0656-4479 [employer], [address] [place] [address]. All rights reserved. This information is not intended as a substitute for professional medical care. Always follow your healthcare professional's instructions.

## 2020-12-16 NOTE — DISCHARGE SUMMARY
[facility]  Cardiology  Discharge Summary      Patient Name: Emanuel Daniel  MRN: [id number]  Admission Date: 12/12/2020  Hospital Length of Stay: 3 days  Discharge Date and Time:  12/15/2020 9:53 PM  Attending Physician: No att. providers found    Discharging Provider: Jung Padilla DO  Primary Care Physician: DEANNE Argueta MD    HPI:   Mr. Daniel is a 64 y/o gentleman with a PMHx of CAD s/p 1V CABG (LIMA-LAD) s/p PCI to ostial LM and LCx/OM1, PAD s/p R EIA stent, HTN, HLD, smoking, alcohol abuse, CKD.  He presented to the ED earlier today complaining of chest pain which started earlier this morning >7/10.  Patient states occurred at rest and similar to anginal pain he has felt in the past prior to his PCIs and CABG.  He also has associated shortness of breath.  He denies any syncope, orthopnea, PND, or LE edema.  Patient was still having chest discomfort at the time of my evaluation; however significantly improved to 2/10 on nitro gtt @15.    He does smoke 1.5 packs per day. He missed 2 days worth of plavix because he ran out of medication at home.    Vitals show blood pressure is slightly elevated to the 150s mmHg systolic.  Initial ECG showed ST elevations not meeting STEMI criteria in leads V2 and V3 with inferolateral ST depressions.  Troponin elevated to 2.5.  Cr 3.6, HGB 12.0.    Procedure(s) (LRB):  ANGIOGRAM, CORONARY ARTERY (N/A)  Stent, Drug Eluting, Multi Vessel, Coronary  IVUS, Coronary  Bypass graft study     Indwelling Lines/Drains at time of discharge:  Lines/Drains/Airways     None                 Hospital Course:  12/13/20 troponins continue to up trend and despite nitro drip patient had intractable anginal symptoms. 12/14  was taken to the cath lab where he had PCI of OM1 superior and inferior branches with drug-eluting stent and successful PCI of mid and distal left circumflex with drug-eluting stent, and successful PCI the left circumflex OM1 bifurcation with  drug-eluting stent.  He was monitored overnight and discharged home on 12/15/20.  He will follow-up with his primary care physician Dr RICK Argueta who will assure that the patient has nephrology follow-up.      Physical Exam   Constitutional: He is oriented to person, place, and time and well-developed, well-nourished, and in no distress. No distress.   HENT:   Head: Normocephalic and atraumatic.   Eyes: No scleral icterus.   Neck: No JVD present.   Cardiovascular: Normal rate and regular rhythm.   Pulmonary/Chest: No respiratory distress. He has wheezes.   Abdominal: Soft. He exhibits no distension. There is no abdominal tenderness.   Musculoskeletal:         General: Edema present.   Neurological: He is alert and oriented to person, place, and time.   Skin: Skin is warm and dry. He is not diaphoretic.   Psychiatric: Affect normal.         Consults:   Consults (From admission, onward)        Status Ordering Provider     Inpatient consult to Cardiology  Once     Provider:  (Not yet assigned)    Completed IRMA CARBALLO          Significant Diagnostic Studies: Labs:   BMP:   Recent Labs   Lab 12/14/20  0340 12/15/20  0225   * 111*    138   K 4.7 4.4    108   CO2 19* 19*   BUN 43* 62*   CREATININE 3.5* 3.5*   CALCIUM 8.5* 8.0*   MG 2.0 2.1       Pending Diagnostic Studies:     None          Final Active Diagnoses:    Diagnosis Date Noted POA    PRINCIPAL PROBLEM:  NSTEMI (non-ST elevated myocardial infarction) [I21.4] 10/05/2014 Yes    Stage 4 chronic kidney disease [N18.4] 01/05/2015 Yes    Smoker [F17.200] 08/05/2013 Yes     Chronic    Alcohol abuse [F10.10] 08/05/2013 Yes    HTN (hypertension) [I10] 08/05/2013 Yes     Chronic      Problems Resolved During this Admission:     * NSTEMI (non-ST elevated myocardial infarction)  Plan:  GDMT   -Loaded was at 180 mg of Brilinta, Brilinta 90 mg b.i.d. for the next year   -aspirin 81 mg   -Lipitor 80mg   -Carvedilol 25mg BID        Stage 4  chronic kidney disease  Creatinine stable after cardiac catheterization procedure.  Plan  -lokelma 5g packet every other day  -follow-up with PCP will arrange for nephrology follow-up.    HTN (hypertension)  Coreg 25 bid, Amlodipine 10    Smoker  Nicotine patch        Discharged Condition: good    Disposition: Home or Self Care    Follow Up:  Follow-up Information     DEANNE Argueta MD. Call in 1 day.    Specialty: Internal Medicine  Why: Call to set up an appointment after discharge from the hospital.  Your PCP will need to ensure that you have a follow-up with the nephrologist.  Contact information:  [facility]  [address]  [facility] [address] [phone]                 Patient Instructions:   No discharge procedures on file.  Medications:  Reconciled Home Medications:      Medication List      START taking these medications    atorvastatin 80 MG tablet  Commonly known as: LIPITOR  Take 1 tablet (80 mg total) by mouth every evening.     BRILINTA 90 mg tablet  Generic drug: ticagrelor  Take 1 tablet (90 mg total) by mouth 2 (two) times a day.        CHANGE how you take these medications    nitroGLYCERIN 0.4 MG SL tablet  Commonly known as: NITROSTAT  Place 1 tablet (0.4 mg total) under the tongue every 5 (five) minutes as needed for Chest pain. Up to 3 doses. If chest pain is not relieved or worsens 3 to 5 minutes after 1 dose, call 911 and seek immediate emergency medical attention.  What changed: See the new instructions.        CONTINUE taking these medications    amLODIPine 10 MG tablet  Commonly known as: NORVASC  amlodipine 10 mg tablet q HS     aspirin 81 MG EC tablet  Commonly known as: ECOTRIN  Take 1 tablet (81 mg total) by mouth once daily.     carvediloL 25 MG tablet  Commonly known as: COREG  carvedilol 25 mg tablet TWO TIMES A DAY     LOKELMA 5 gram packet  Generic drug: sodium zirconium cyclosilicate  Take 5 g by mouth every other day.        STOP taking these medications     allopurinoL 100 MG tablet  Commonly known as: ZYLOPRIM     clopidogreL 75 mg tablet  Commonly known as: PLAVIX     colchicine 0.6 mg tablet  Commonly known as: COLCRYS     losartan 25 MG tablet  Commonly known as: COZAAR     rosuvastatin 40 MG Tab  Commonly known as: CRESTOR            Time spent on the discharge of patient: 45 minutes    Jung Padilla DO  Cardiology  [facility]

## 2020-12-16 NOTE — ASSESSMENT & PLAN NOTE
Creatinine stable after cardiac catheterization procedure.  Plan  -lokelma 5g packet every other day  -follow-up with PCP will arrange for nephrology follow-up.

## 2020-12-16 NOTE — TELEPHONE ENCOUNTER
C3 nurse attempted to contact patient. The following occurred:   C3 nurse attempted to contact Emanuel Daniel for a TCC post hospital discharge follow up call. The patient is unable to conduct the call @ this time. The patient's EC requested a callback @ 1500.    The patient does not have a scheduled HOSFU appointment within 7-14 days post hospital discharge date 12/15/2020. Patient PCP not within [facility].

## 2020-12-24 ENCOUNTER — CARE AT HOME (OUTPATIENT)
Dept: HOME HEALTH SERVICES | Facility: CLINIC | Age: 65
End: 2020-12-24
Payer: MEDICARE

## 2020-12-24 VITALS
SYSTOLIC BLOOD PRESSURE: 150 MMHG | OXYGEN SATURATION: 96 % | DIASTOLIC BLOOD PRESSURE: 100 MMHG | TEMPERATURE: 98 F | HEART RATE: 80 BPM | RESPIRATION RATE: 18 BRPM

## 2020-12-24 DIAGNOSIS — N18.4 STAGE 4 CHRONIC KIDNEY DISEASE: ICD-10-CM

## 2020-12-24 DIAGNOSIS — J41.0 SIMPLE CHRONIC BRONCHITIS: ICD-10-CM

## 2020-12-24 DIAGNOSIS — Z72.0 TOBACCO ABUSE: ICD-10-CM

## 2020-12-24 DIAGNOSIS — I25.10 CORONARY ARTERY DISEASE INVOLVING NATIVE CORONARY ARTERY OF NATIVE HEART, ANGINA PRESENCE UNSPECIFIED: ICD-10-CM

## 2020-12-24 DIAGNOSIS — F41.9 ANXIETY: Primary | ICD-10-CM

## 2020-12-24 PROCEDURE — 99350 PR HOME VISIT,ESTAB PATIENT,LEVEL IV: ICD-10-PCS | Mod: S$GLB,,, | Performed by: NURSE PRACTITIONER

## 2020-12-24 PROCEDURE — 99350 HOME/RES VST EST HIGH MDM 60: CPT | Mod: S$GLB,,, | Performed by: NURSE PRACTITIONER

## 2020-12-24 RX ORDER — FLUTICASONE PROPIONATE AND SALMETEROL 100; 50 UG/1; UG/1
1 POWDER RESPIRATORY (INHALATION) 2 TIMES DAILY
Qty: 60 EACH | Refills: 5 | Status: SHIPPED | OUTPATIENT
Start: 2020-12-24 | End: 2021-06-22

## 2020-12-24 RX ORDER — ESCITALOPRAM OXALATE 10 MG/1
10 TABLET ORAL DAILY
Qty: 30 TABLET | Refills: 1 | Status: SHIPPED | OUTPATIENT
Start: 2020-12-24 | End: 2021-02-22

## 2020-12-24 RX ORDER — ALBUTEROL SULFATE 90 UG/1
1-2 AEROSOL, METERED RESPIRATORY (INHALATION) EVERY 6 HOURS PRN
Qty: 18 G | Refills: 3 | Status: SHIPPED | OUTPATIENT
Start: 2020-12-24 | End: 2021-12-24

## 2020-12-28 ENCOUNTER — TELEPHONE (OUTPATIENT)
Dept: ADMINISTRATIVE | Facility: CLINIC | Age: 65
End: 2020-12-28

## (undated) DEVICE — SPONGE DERMACEA 4X4IN 12PLY

## (undated) DEVICE — KIT CUSTOM WASTE BAG

## (undated) DEVICE — CATH BLLN DORADO 8-4

## (undated) DEVICE — WIRE DOC EXTENSION

## (undated) DEVICE — CATH PTCA RX TREK 2.25X15MM

## (undated) DEVICE — SPIKE CONTRAST CONTROLLER

## (undated) DEVICE — GUIDEWIRE LAUREATE 035IN 260CM

## (undated) DEVICE — CATH IMA INFINITI 4FRX100CM

## (undated) DEVICE — GUIDEWIRE FIELDER XT.014X190CM

## (undated) DEVICE — CATH TREK RX 3.5MM X 15MM

## (undated) DEVICE — GUIDEWIRE SUPRA CORE 035 190CM

## (undated) DEVICE — GUIDE WIRE BMW 014 X190

## (undated) DEVICE — KIT CO-PILOT

## (undated) DEVICE — DEVICE MYNX GRIP 6/7F

## (undated) DEVICE — INFLATOR ENCORE 26 BLLN INFL

## (undated) DEVICE — WIRE GUIDE SAFE-T-J .035 260CM

## (undated) DEVICE — CATH BLLN DURADO 7 X 4

## (undated) DEVICE — CATH DXTERITY JR40 100CM 6FR

## (undated) DEVICE — CATH ANGIO SOFT VU 5FR 65CM

## (undated) DEVICE — FILTER HYDROPHOBIC BACTRLOGCL

## (undated) DEVICE — CATH TREK RX 3.0MM X 15MM

## (undated) DEVICE — CATH EAGLE EYE PLATINUM

## (undated) DEVICE — SHEATH INTRODUCER 6FR 11CM

## (undated) DEVICE — TUBING CNTRST INJ ADPT 72IN

## (undated) DEVICE — CATH BLLN FG APEX MR 3.50X15MM

## (undated) DEVICE — CATH GUID EBU4 LAUNCH 6FRX100

## (undated) DEVICE — CATH MICRO CORSAIR PRO 135CM

## (undated) DEVICE — GUIDE LAUNCHER 6FR EBU 3.5

## (undated) DEVICE — CATH MINI TREK 1.20MMX15MM

## (undated) DEVICE — Device

## (undated) DEVICE — KIT CUSTOM MANIFOLD

## (undated) DEVICE — INFLATOR ENCORE

## (undated) DEVICE — TEGADERM IV

## (undated) DEVICE — WIRE PILOT .014X190

## (undated) DEVICE — INTRODUCER VASC RADPQ 5FRX10CM

## (undated) DEVICE — GUIDEWIRE STF .035X180CM ANG

## (undated) DEVICE — GUIDEWIRE ANG STF .035INX18CM

## (undated) DEVICE — KIT PROBE COVER WITH GEL

## (undated) DEVICE — CATH BLLN FG APEX MR 2.50X40MM

## (undated) DEVICE — CATH DXTERITY JL40 100CM 6FR

## (undated) DEVICE — KIT CUSTOM PROCEDURE CONTRAST

## (undated) DEVICE — STOPCOCK NDLS INJ MALE LL IV

## (undated) DEVICE — CATH BLLN APEX FLEX MR 1.50X20

## (undated) DEVICE — OMNIPAQUE 350 200ML

## (undated) DEVICE — KIT INTRO MICRO NIT VSI 4FR

## (undated) DEVICE — CATH BLLN FG APEX MR 3.00X20MM

## (undated) DEVICE — KIT MICROINTRO 4F .018X40X7CM

## (undated) DEVICE — INTRODUCER VASC RADPQ 6FRX10CM

## (undated) DEVICE — SEE MEDLINE ITEM 156894

## (undated) DEVICE — SHEATH INTRODUCER 4FR 11CM

## (undated) DEVICE — GUIDEWIRE EMERALD 150CM PTFE

## (undated) DEVICE — CATH BLLN FG APEX MR 2.00X15MM